# Patient Record
Sex: FEMALE | Race: WHITE | NOT HISPANIC OR LATINO | Employment: FULL TIME | URBAN - METROPOLITAN AREA
[De-identification: names, ages, dates, MRNs, and addresses within clinical notes are randomized per-mention and may not be internally consistent; named-entity substitution may affect disease eponyms.]

---

## 2017-02-10 ENCOUNTER — TRANSFERRED RECORDS (OUTPATIENT)
Dept: HEALTH INFORMATION MANAGEMENT | Facility: CLINIC | Age: 52
End: 2017-02-10

## 2017-05-09 ENCOUNTER — TRANSFERRED RECORDS (OUTPATIENT)
Dept: HEALTH INFORMATION MANAGEMENT | Facility: CLINIC | Age: 52
End: 2017-05-09

## 2017-10-20 ENCOUNTER — APPOINTMENT (OUTPATIENT)
Dept: CT IMAGING | Facility: CLINIC | Age: 52
End: 2017-10-20
Attending: EMERGENCY MEDICINE
Payer: COMMERCIAL

## 2017-10-20 ENCOUNTER — HOSPITAL ENCOUNTER (EMERGENCY)
Facility: CLINIC | Age: 52
Discharge: HOME | End: 2017-10-20
Attending: EMERGENCY MEDICINE | Admitting: EMERGENCY MEDICINE
Payer: COMMERCIAL

## 2017-10-20 VITALS
SYSTOLIC BLOOD PRESSURE: 121 MMHG | DIASTOLIC BLOOD PRESSURE: 77 MMHG | OXYGEN SATURATION: 95 % | HEART RATE: 92 BPM | TEMPERATURE: 99.1 F | RESPIRATION RATE: 15 BRPM

## 2017-10-20 DIAGNOSIS — K52.9 ENTERITIS: ICD-10-CM

## 2017-10-20 LAB
ALBUMIN SERPL-MCNC: 3.6 G/DL (ref 3.4–5)
ALP SERPL-CCNC: 82 U/L (ref 40–150)
ALT SERPL W P-5'-P-CCNC: 101 U/L (ref 0–50)
ANION GAP SERPL CALCULATED.3IONS-SCNC: 8 MMOL/L (ref 3–14)
AST SERPL W P-5'-P-CCNC: 51 U/L (ref 0–45)
BASOPHILS # BLD AUTO: 0.1 10E9/L (ref 0–0.2)
BASOPHILS NFR BLD AUTO: 1 %
BILIRUB SERPL-MCNC: 0.6 MG/DL (ref 0.2–1.3)
BUN SERPL-MCNC: 12 MG/DL (ref 7–30)
CALCIUM SERPL-MCNC: 9.1 MG/DL (ref 8.5–10.1)
CHLORIDE SERPL-SCNC: 101 MMOL/L (ref 94–109)
CO2 SERPL-SCNC: 26 MMOL/L (ref 20–32)
CREAT SERPL-MCNC: 0.73 MG/DL (ref 0.52–1.04)
DIFFERENTIAL METHOD BLD: ABNORMAL
EOSINOPHIL # BLD AUTO: 0.1 10E9/L (ref 0–0.7)
EOSINOPHIL NFR BLD AUTO: 1 %
ERYTHROCYTE [DISTWIDTH] IN BLOOD BY AUTOMATED COUNT: 14.1 % (ref 10–15)
GFR SERPL CREATININE-BSD FRML MDRD: 84 ML/MIN/1.7M2
GLUCOSE SERPL-MCNC: 101 MG/DL (ref 70–99)
HCT VFR BLD AUTO: 34.8 % (ref 35–47)
HGB BLD-MCNC: 10.8 G/DL (ref 11.7–15.7)
IMM GRANULOCYTES # BLD: 0 10E9/L (ref 0–0.4)
IMM GRANULOCYTES NFR BLD: 0.4 %
LACTATE SERPL-SCNC: 1.8 MMOL/L (ref 0.4–2)
LIPASE SERPL-CCNC: 225 U/L (ref 73–393)
LYMPHOCYTES # BLD AUTO: 1.1 10E9/L (ref 0.8–5.3)
LYMPHOCYTES NFR BLD AUTO: 16.1 %
MCH RBC QN AUTO: 28.3 PG (ref 26.5–33)
MCHC RBC AUTO-ENTMCNC: 31 G/DL (ref 31.5–36.5)
MCV RBC AUTO: 91 FL (ref 78–100)
MONOCYTES # BLD AUTO: 0.6 10E9/L (ref 0–1.3)
MONOCYTES NFR BLD AUTO: 7.8 %
NEUTROPHILS # BLD AUTO: 5.2 10E9/L (ref 1.6–8.3)
NEUTROPHILS NFR BLD AUTO: 73.7 %
NRBC # BLD AUTO: 0 10*3/UL
NRBC BLD AUTO-RTO: 0 /100
PLATELET # BLD AUTO: 323 10E9/L (ref 150–450)
POTASSIUM SERPL-SCNC: 3.8 MMOL/L (ref 3.4–5.3)
PROT SERPL-MCNC: 8.5 G/DL (ref 6.8–8.8)
RBC # BLD AUTO: 3.81 10E12/L (ref 3.8–5.2)
SODIUM SERPL-SCNC: 135 MMOL/L (ref 133–144)
WBC # BLD AUTO: 7.1 10E9/L (ref 4–11)

## 2017-10-20 PROCEDURE — 25000128 H RX IP 250 OP 636: Performed by: EMERGENCY MEDICINE

## 2017-10-20 PROCEDURE — 85025 COMPLETE CBC W/AUTO DIFF WBC: CPT | Performed by: EMERGENCY MEDICINE

## 2017-10-20 PROCEDURE — 96374 THER/PROPH/DIAG INJ IV PUSH: CPT | Mod: 59

## 2017-10-20 PROCEDURE — 96376 TX/PRO/DX INJ SAME DRUG ADON: CPT

## 2017-10-20 PROCEDURE — 83605 ASSAY OF LACTIC ACID: CPT | Performed by: EMERGENCY MEDICINE

## 2017-10-20 PROCEDURE — 99285 EMERGENCY DEPT VISIT HI MDM: CPT | Mod: 25

## 2017-10-20 PROCEDURE — 80053 COMPREHEN METABOLIC PANEL: CPT | Performed by: EMERGENCY MEDICINE

## 2017-10-20 PROCEDURE — 93005 ELECTROCARDIOGRAM TRACING: CPT

## 2017-10-20 PROCEDURE — 96361 HYDRATE IV INFUSION ADD-ON: CPT

## 2017-10-20 PROCEDURE — 96375 TX/PRO/DX INJ NEW DRUG ADDON: CPT

## 2017-10-20 PROCEDURE — 83690 ASSAY OF LIPASE: CPT | Performed by: EMERGENCY MEDICINE

## 2017-10-20 PROCEDURE — 74177 CT ABD & PELVIS W/CONTRAST: CPT

## 2017-10-20 RX ORDER — HYDROMORPHONE HYDROCHLORIDE 1 MG/ML
0.5 INJECTION, SOLUTION INTRAMUSCULAR; INTRAVENOUS; SUBCUTANEOUS
Status: DISCONTINUED | OUTPATIENT
Start: 2017-10-20 | End: 2017-10-21 | Stop reason: HOSPADM

## 2017-10-20 RX ORDER — ONDANSETRON 2 MG/ML
4 INJECTION INTRAMUSCULAR; INTRAVENOUS ONCE
Status: COMPLETED | OUTPATIENT
Start: 2017-10-20 | End: 2017-10-20

## 2017-10-20 RX ORDER — OXYCODONE AND ACETAMINOPHEN 5; 325 MG/1; MG/1
1-2 TABLET ORAL EVERY 6 HOURS PRN
Qty: 15 TABLET | Refills: 0 | Status: SHIPPED | OUTPATIENT
Start: 2017-10-20 | End: 2018-01-17

## 2017-10-20 RX ORDER — IOPAMIDOL 755 MG/ML
500 INJECTION, SOLUTION INTRAVASCULAR ONCE
Status: COMPLETED | OUTPATIENT
Start: 2017-10-20 | End: 2017-10-20

## 2017-10-20 RX ADMIN — ONDANSETRON 4 MG: 2 INJECTION INTRAMUSCULAR; INTRAVENOUS at 18:41

## 2017-10-20 RX ADMIN — IOPAMIDOL 84 ML: 755 INJECTION, SOLUTION INTRAVENOUS at 19:10

## 2017-10-20 RX ADMIN — HYDROMORPHONE HYDROCHLORIDE 0.5 MG: 1 INJECTION, SOLUTION INTRAMUSCULAR; INTRAVENOUS; SUBCUTANEOUS at 22:16

## 2017-10-20 RX ADMIN — SODIUM CHLORIDE 61 ML: 9 INJECTION, SOLUTION INTRAVENOUS at 19:10

## 2017-10-20 RX ADMIN — SODIUM CHLORIDE 1000 ML: 9 INJECTION, SOLUTION INTRAVENOUS at 18:40

## 2017-10-20 RX ADMIN — HYDROMORPHONE HYDROCHLORIDE 0.5 MG: 1 INJECTION, SOLUTION INTRAMUSCULAR; INTRAVENOUS; SUBCUTANEOUS at 18:43

## 2017-10-20 ASSESSMENT — ENCOUNTER SYMPTOMS
ABDOMINAL PAIN: 1
NAUSEA: 1
BLOOD IN STOOL: 0
VOMITING: 0

## 2017-10-20 NOTE — ED AVS SNAPSHOT
[facility] Emergency Department    [address]    [facility] [address]    Phone:  [phone]    Fax:  [phone]                                       Mercedes Arita   MRN: [id number]    Department:  [facility] Emergency Department   Date of Visit:  10/20/2017           Patient Information     Date Of Birth          1965        Your diagnoses for this visit were:     Enteritis        You were seen by Celio Waldron MD.      Follow-up Information     Call Gastroenterologist .    Why:  call Monday AM to discuss follow-up         Follow up with [facility] Emergency Department.    Specialty:  EMERGENCY MEDICINE    Why:  As needed, If symptoms worsen - uncontrollable/worsening pain, fever > 100.4, vomiting, blood in stool, or for any other concerns.     Contact information:    [address]  [facility] [address]  [phone]        Discharge Instructions       Discharge Instructions  Abdominal Pain    Abdominal pain (belly pain) can be caused by many things. Your evaluation today does not show the exact cause for your pain. Your provider today has decided that it is unlikely your pain is due to a life threatening problem, or a problem requiring surgery or hospital admission. Sometimes those problems cannot be found right away, so it is very important that you follow up as directed.  Sometimes only the changes which occur over time allow the cause of your pain to be found.    Generally, every Emergency Department visit should have a follow-up clinic visit with either a primary or a specialty clinic/provider. Please follow-up as instructed by your emergency provider today. With abdominal pain, we often recommend very close follow-up, such as the following day.    ADULTS:  Return to the Emergency Department right away if:      You get an oral temperature above 102oF or as directed by your provider.    You have blood in your stools. This  may be bright red or appear as black, tarry stools.      You keep vomiting (throwing up) or cannot drink liquids.    You see blood when you vomit.     You cannot have a bowel movement or you cannot pass gas.    Your stomach gets bloated or bigger.    Your skin or the whites of your eyes look yellow.    You faint.    You have bloody, frequent or painful urination (peeing).    You have new symptoms or anything that worries you.    CHILDREN:  Return to the Emergency Department right away if your child has any of the above-listed symptoms or the following:      Pushes your hand away or screams/cries when his/her belly is touched.    You notice your child is very fussy or weak.    Your child is very tired and is too tired to eat or drink.    Your child is dehydrated.  Signs of dehydration can be:  o Significant change in the amount of wet diapers/urine.  o Your infant or child starts to have dry mouth and lips, or no saliva (spit) or tears.    PREGNANT WOMEN:  Return to the Emergency Department right away if you have any of the above-listed symptoms or the following:      You have bleeding, leaking fluid or passing tissue from the vagina.    You have worse pain or cramping, or pain in your shoulder or back.    You have vomiting that will not stop.    You have a temperature of 100oF or more.    Your baby is not moving as much as usual.    You faint.    You get a bad headache with or without eye problems and abdominal pain.    You have a seizure.    You have unusual discharge from your vagina and abdominal pain.    Abdominal pain is pretty common during pregnancy.  Your pain may or may not be related to your pregnancy. You should follow-up closely with your OB provider so they can evaluate you and your baby.  Until you follow-up with your regular provider, do the following:       Avoid sex and do not put anything in your vagina.    Drink clear fluids.    Only take medications approved by your provider.    MORE  "INFORMATION:    Appendicitis:  A possible cause of abdominal pain in any person who still has their appendix is acute appendicitis. Appendicitis is often hard to diagnose.  Testing does not always rule out early appendicitis or other causes of abdominal pain. Close follow-up with your provider and re-evaluations may be needed to figure out the reason for your abdominal pain.    Follow-up:  It is very important that you make an appointment with your clinic and go to the appointment.  If you do not follow-up with your primary provider, it may result in missing an important development which could result in permanent injury or disability and/or lasting pain.  If there is any problem keeping your appointment, call your provider or return to the Emergency Department.    Medications:  Take your medications as directed by your provider today.  Before using over-the-counter medications, ask your provider and make sure to take the medications as directed.  If you have any questions about medications, ask your provider.    Diet:  Resume your normal diet as much as possible, but do not eat fried, fatty or spicy foods while you have pain.  Do not drink alcohol or have caffeine.  Do not smoke tobacco.    Probiotics: If you have been given an antibiotic, you may want to also take a probiotic pill or eat yogurt with live cultures. Probiotics have \"good bacteria\" to help your intestines stay healthy. Studies have shown that probiotics help prevent diarrhea (loose stools) and other intestine problems (including C. diff infection) when you take antibiotics. You can buy these without a prescription in the pharmacy section of the store.     If you were given a prescription for medicine here today, be sure to read all of the information (including the package insert) that comes with your prescription.  This will include important information about the medicine, its side effects, and any warnings that you need to know about.  The " pharmacist who fills the prescription can provide more information and answer questions you may have about the medicine.  If you have questions or concerns that the pharmacist cannot address, please call or return to the Emergency Department.       Remember that you can always come back to the Emergency Department if you are not able to see your regular provider in the amount of time listed above, if you get any new symptoms, or if there is anything that worries you.      24 Hour Appointment Hotline       To make an appointment at any [facility], call [phone] ([phone]). If you don't have a family doctor or clinic, we will help you find one. [facility] are conveniently located to serve the needs of you and your family.             Review of your medicines      START taking        Dose / Directions Last dose taken    oxyCODONE-acetaminophen 5-325 MG per tablet   Commonly known as:  PERCOCET   Dose:  1-2 tablet   Quantity:  15 tablet        Take 1-2 tablets by mouth every 6 hours as needed for pain   Refills:  0          Our records show that you are taking the medicines listed below. If these are incorrect, please call your family doctor or clinic.        Dose / Directions Last dose taken    cyanocobalamin 1000 MCG/ML injection   Commonly known as:  VITAMIN B12   Dose:  1 mL        Inject 1 mL as directed every 30 days.   Refills:  0        mercaptopurine 50 MG tablet CHEMO   Commonly known as:  PURINETHOL        Refills:  0                Prescriptions were sent or printed at these locations (1 Prescription)                   Other Prescriptions                Printed at Department/Unit printer (1 of 1)         oxyCODONE-acetaminophen (PERCOCET) 5-325 MG per tablet                Procedures and tests performed during your visit     CBC with platelets differential    CT Abdomen Pelvis w Contrast    Comprehensive metabolic panel    EKG 12 lead    Lactic acid    Lipase    Peripheral IV catheter       Orders Needing Specimen Collection     None      Pending Results     Date and Time Order Name Status Description    10/20/2017 1814 EKG 12 lead Preliminary             Pending Culture Results     No orders found from 10/18/2017 to 10/21/2017.            Pending Results Instructions     If you had any lab results that were not finalized at the time of your Discharge, you can call the ED Lab Result RN at [phone]. You will be contacted by this team for any positive Lab results or changes in treatment. The nurses are available 7 days a week from 10A to 6:30P.  You can leave a message 24 hours per day and they will return your call.        Test Results From Your Hospital Stay        10/20/2017  6:45 PM      Component Results     Component Value Ref Range & Units Status    WBC 7.1 4.0 - 11.0 10e9/L Final    RBC Count 3.81 3.8 - 5.2 10e12/L Final    Hemoglobin 10.8 (L) 11.7 - 15.7 g/dL Final    Hematocrit 34.8 (L) 35.0 - 47.0 % Final    MCV 91 78 - 100 fl Final    MCH 28.3 26.5 - 33.0 pg Final    MCHC 31.0 (L) 31.5 - 36.5 g/dL Final    RDW 14.1 10.0 - 15.0 % Final    Platelet Count 323 150 - 450 10e9/L Final    Diff Method Automated Method  Final    % Neutrophils 73.7 % Final    % Lymphocytes 16.1 % Final    % Monocytes 7.8 % Final    % Eosinophils 1.0 % Final    % Basophils 1.0 % Final    % Immature Granulocytes 0.4 % Final    Nucleated RBCs 0 0 /100 Final    Absolute Neutrophil 5.2 1.6 - 8.3 10e9/L Final    Absolute Lymphocytes 1.1 0.8 - 5.3 10e9/L Final    Absolute Monocytes 0.6 0.0 - 1.3 10e9/L Final    Absolute Eosinophils 0.1 0.0 - 0.7 10e9/L Final    Absolute Basophils 0.1 0.0 - 0.2 10e9/L Final    Abs Immature Granulocytes 0.0 0 - 0.4 10e9/L Final    Absolute Nucleated RBC 0.0  Final         10/20/2017  6:50 PM      Component Results     Component Value Ref Range & Units Status    Lactic Acid 1.8 0.4 - 2.0 mmol/L Final         10/20/2017  7:05 PM      Component Results     Component Value Ref Range &  Units Status    Sodium 135 133 - 144 mmol/L Final    Potassium 3.8 3.4 - 5.3 mmol/L Final    Chloride 101 94 - 109 mmol/L Final    Carbon Dioxide 26 20 - 32 mmol/L Final    Anion Gap 8 3 - 14 mmol/L Final    Glucose 101 (H) 70 - 99 mg/dL Final    Urea Nitrogen 12 7 - 30 mg/dL Final    Creatinine 0.73 0.52 - 1.04 mg/dL Final    GFR Estimate 84 >60 mL/min/1.7m2 Final    Non  GFR Calc    GFR Estimate If Black >90 >60 mL/min/1.7m2 Final    African American GFR Calc    Calcium 9.1 8.5 - 10.1 mg/dL Final    Bilirubin Total 0.6 0.2 - 1.3 mg/dL Final    Albumin 3.6 3.4 - 5.0 g/dL Final    Protein Total 8.5 6.8 - 8.8 g/dL Final    Alkaline Phosphatase 82 40 - 150 U/L Final     (H) 0 - 50 U/L Final    AST 51 (H) 0 - 45 U/L Final         10/20/2017  7:05 PM      Component Results     Component Value Ref Range & Units Status    Lipase 225 73 - 393 U/L Final         10/20/2017  9:44 PM      Narrative     CT ABDOMEN AND PELVIS WITH CONTRAST 10/20/2017 7:20 PM     HISTORY: Right-sided abdominal pain - history of Crohn's, hernia,  multiple surgeries.    TECHNIQUE: 84 mL Isovue-370 IV. Radiation dose for this scan was  reduced using automated exposure control, adjustment of the mA and/or  kV according to patient size, or iterative reconstruction technique.    COMPARISON: Abdomen/pelvis CT from 8/21/2012.    FINDINGS: Included portions of the lung bases are unremarkable.    Mild fatty infiltration of the liver. Previous cholecystectomy. Spleen  is normal. Pancreas is normal. No adrenal lesions. Kidneys are normal.  No retroperitoneal adenopathy or evidence of aortic aneurysm.    Scans through the pelvis demonstrate a normal-appearing bladder. No  free fluid. No adnexal masses.    There has been previous surgery near the ileocecal junction. There is  mild dilatation of the colon in this region. No significant small  bowel dilatation in this region. The colon does decrease in size to  normal without definite  focal obstruction seen.    There is inflammation surrounding the proximal portion of the  duodenum. This is near the pylorus/duodenal bulb region. Stomach does  not appear distended. There is some very mild small bowel dilatation  involving the C-loop of the duodenum, but no other small bowel  dilatation seen. No free fluid or free air.        Impression     IMPRESSION:  1. Postoperative changes from ileocecal surgery. The ileum appears  widely patent at small bowel colon junction. The colon in this region  is mildly dilated. No evidence of obstruction, however. The colon  tapers to normal size again without definite evidence of obstruction.  2. New area of inflammation in the proximal duodenum in the region of  the duodenal bulb and adjacent to the pylorus. The stomach does not  appear distended. There is fluid in the C-loop of the duodenum with  possible very mild small bowel dilatation involving the C-loop. There  is, however, no evidence of obstruction.    KRYS ROSSI MD                Clinical Quality Measure: Blood Pressure Screening     Your blood pressure was checked while you were in the emergency department today. The last reading we obtained was  BP: 157/90 . Please read the guidelines below about what these numbers mean and what you should do about them.  If your systolic blood pressure (the top number) is less than 120 and your diastolic blood pressure (the bottom number) is less than 80, then your blood pressure is normal. There is nothing more that you need to do about it.  If your systolic blood pressure (the top number) is 120-139 or your diastolic blood pressure (the bottom number) is 80-89, your blood pressure may be higher than it should be. You should have your blood pressure rechecked within a year by a primary care provider.  If your systolic blood pressure (the top number) is 140 or greater or your diastolic blood pressure (the bottom number) is 90 or greater, you may have high blood  "pressure. High blood pressure is treatable, but if left untreated over time it can put you at risk for heart attack, stroke, or kidney failure. You should have your blood pressure rechecked by a primary care provider within the next 4 weeks.  If your provider in the emergency department today gave you specific instructions to follow-up with your doctor or provider even sooner than that, you should follow that instruction and not wait for up to 4 weeks for your follow-up visit.        Thank you for choosing [facility]       Thank you for choosing [facility] for your care. Our goal is always to provide you with excellent care. Hearing back from our patients is one way we can continue to improve our services. Please take a few minutes to complete the written survey that you may receive in the mail after you visit with us. Thank you!        [facility] Information     [employer] lets you send messages to your doctor, view your test results, renew your prescriptions, schedule appointments and more. To sign up, go to [email or web address] . Click on \"Log in\" on the left side of the screen, which will take you to the Welcome page. Then click on \"Sign up Now\" on the right side of the page.     You will be asked to enter the access code listed below, as well as some personal information. Please follow the directions to create your username and password.     Your access code is: [email or web address]  Expires: 2018  9:57 PM     Your access code will  in 90 days. If you need help or a new code, please call your [facility] clinic or [phone].        Care EveryWhere ID     This is your Care EveryWhere ID. This could be used by other organizations to access your [facility] medical records  [id number]        Equal Access to Services     KATERINA ROMAN : [address] [place], [address]. So [facility] [phone].    ATENCIÓN: Si habla español, tiene a emmanuel " disposición servicios gratuitos de asistencia lingüística. Abbie al [phone].    We comply with applicable federal civil rights laws and [place] laws. We do not discriminate on the basis of race, color, national origin, age, disability, sex, sexual orientation, or gender identity.            After Visit Summary       This is your record. Keep this with you and show to your community pharmacist(s) and doctor(s) at your next visit.

## 2017-10-20 NOTE — ED NOTES
Patient presents for complaint of mid epigastric abdominal pain since Tuesday. Patient states that she was out for a walk and noticed onset of the pain. Patient states it has been intermittent since until today when it became sharp and constant. Hx of bowel resections and Crohn's disease. ABC intact, A&Ox4.

## 2017-10-20 NOTE — ED AVS SNAPSHOT
[facility] Emergency Department    [address]    [facility] [address]    Phone:  [phone]    Fax:  [phone]                                       Mercedes Arita   MRN: [id number]    Department:  [facility] Emergency Department   Date of Visit:  10/20/2017           After Visit Summary Signature Page     I have received my discharge instructions, and my questions have been answered. I have discussed any challenges I see with this plan with the nurse or doctor.    ..........................................................................................................................................  Patient/Patient Representative Signature      ..........................................................................................................................................  Patient Representative Print Name and Relationship to Patient    ..................................................               ................................................  Date                                            Time    ..........................................................................................................................................  Reviewed by Signature/Title    ...................................................              ..............................................  Date                                                            Time

## 2017-10-21 NOTE — ED PROVIDER NOTES
History     Chief Complaint:  Abdominal Pain    HPI   Mercedes Arita is a 52 year old female with Crohn's disease on Mercaptopurine and with an extensive surgical history including 7 ventral hernia repairs, appendectomy, cholecystectomy, and ileocolonic resections who presents to the emergency department today for evaluation of abdominal pain. The patient reports that on Tuesday, 10/17/2017, she was walking on a treadmill when she developed some abdominal pain which then subsided. On Wednesday, the patient's abdominal returned and was worse and continued throughout Thursday. This morning, the patient reports that her abdominal pain was worse but then this afternoon her pain returned and was constant, prompting her current presentation to the emergency department. The patient characterizes her pain as sharp and reports that she is nauseous. The patient had a normal, non-bloody bowel movement this morning and has not vomited. The patient has not taken any medication to manage her pain. Of note, the patient's gastroenterologist is Dr. Chevy Fonseca.     Allergies:  Contrast Dye    Medications:    Mercaptopurine  Cyanocobalamin     Past Medical History:    Chronic pain  Crohn's disease   PONV    Past Surgical History:    Cholecystectomy  Dilate rectum, lysis of adhesion, laparoscopic assisted ileocolic resection  GI surgery, bowel resection times 3 ileo-colonic resections  Hernia repair, ventral x 7 with mesh  Laparoscopic resection ileocecal  Laparotomy exploratory for removal of adhesions  Mohs micrographic procedure for removal of skin cancer on face  Orthopedic surgery, carpal tunnel     Family History:    History reviewed. No pertinent family history.     Social History:  The patient was accompanied to the ED by her friend.   Smoking Status: Never Smoker  Smokeless Tobacco: Never Used  Alcohol Use: Positive -- Rarely -- Two times per year   Marital Status:  Single     Review of Systems   Gastrointestinal: Positive  for abdominal pain and nausea. Negative for blood in stool and vomiting.   All other systems reviewed and are negative.    Physical Exam     Patient Vitals for the past 24 hrs:   BP Temp Temp src Pulse Heart Rate Resp SpO2   10/20/17 2214 - - - - - - 95 %   10/20/17 2213 121/77 - - - - - -   10/20/17 1830 - - - - 77 15 -   10/20/17 1815 - - - - 84 13 -   10/20/17 1812 - - - - 86 - 100 %   10/20/17 1802 157/90 99.1  F (37.3  C) Oral 92 - 16 99 %     Physical Exam  Nursing note and vitals reviewed.  Constitutional: Cooperative.   HENT:   Mouth/Throat: Moist mucous membranes.   Eyes: EOMI, nonicteric sclera  Cardiovascular: Normal rate, regular rhythm, no murmurs, rubs, or gallops  Pulmonary/Chest: Effort normal and breath sounds normal. No respiratory distress. No wheezes. No rales.   Abdominal: Soft. RLQ TTP, nondistended, no guarding or rigidity. BS present.   Musculoskeletal: Normal range of motion.   Neurological: Alert. Moves all extremities spontaneously.   Skin: Skin is warm and dry. No rash noted.   Psychiatric: Normal mood and affect.       Emergency Department Course     ECG:  ECG taken at 1805, ECG read at 1810  Sinus rhythm  Nonspecific ST abnormality  Abnormal QRS-T angle, consider primary T wave abnormality  Abnormal ECG  Rate 81 bpm. AZ interval 112 ms. QRS duration 72 ms. QT/QTc 374/434 ms. P-R-T axes 40 54 -12.    Imaging:  Radiology findings were communicated with the patient who voiced understanding of the findings.    CT Abdomen Pelvis w Contrast  1. Postoperative changes from ileocecal surgery. The ileum appears  widely patent at small bowel colon junction. The colon in this region  is mildly dilated. No evidence of obstruction, however. The colon  tapers to normal size again without definite evidence of obstruction.  2. New area of inflammation in the proximal duodenum in the region of  the duodenal bulb and adjacent to the pylorus. The stomach does not  appear distended. There is fluid in the  C-loop of the duodenum with  possible very mild small bowel dilatation involving the C-loop. There  is, however, no evidence of obstruction.  Reading per radiology    Laboratory:  Laboratory findings were communicated with the patient who voiced understanding of the findings.    CBC: WBC 7.1, HGB 10.8 (L),   CMP: Glucose 101 (H),  (H), AST 51 (H) o/w WNL (Creatinine 0.73)  Lipase: 225  Lactic Acid (Collected 1819): 1.8      Interventions:  1840 NS 1000 ml IV   1841 Zofran 4 mg IV   1843 Dilaudid 0.5 mg IV     Emergency Department Course:    Nursing notes and vitals reviewed.    I performed an exam of the patient as documented above.     The patient was sent for a CT Abdomen Pelvis w Contrast while in the emergency department, results above.      IV was inserted and blood was drawn for laboratory testing, results above.    I personally reviewed the laboratory, imaging, and EKG results with the patient and answered all related questions prior to discharge.    Impression & Plan      Medical Decision Making:  Mercedes Arita is a 52 year old female who presents to the emergency department today with abdominal pain. The patient has a history of Crohn's and is concerned about complications. The differential is broad and includes colitis, abscess, bowel obstruction, ovarian pathology, amongst others. The patient's gallbladder and appendix have previously been surgically removed. CT shows some nonspecific inflammation in her small bowel. This most likely represents an enteritis and is unlikely to represent a Crohn's flare. The patient was given some pain medication for pain management at home. No indication for hospital admission at this time. She was advised to follow up with her gastroenterologist early next week. She is in stable condition at the time of discharge, indications for return to the ED were discussed as well as follow up. All questions were answered and she is in agreement with the  plan.    Diagnosis:    ICD-10-CM    1. Enteritis K52.9      Disposition:   The patient is discharged to home.    Discharge Medications:  Discharge Medication List as of 10/20/2017 10:12 PM      START taking these medications    Details   oxyCODONE-acetaminophen (PERCOCET) 5-325 MG per tablet Take 1-2 tablets by mouth every 6 hours as needed for pain, Disp-15 tablet, R-0, Local Print           Scribe Disclosure:  I, Nick Abbasi, am serving as a scribe at 8:05 PM on 10/20/2017 to document services personally performed by Celio Waldron MD, based on my observations and the provider's statements to me.    [facility] EMERGENCY DEPARTMENT       Celio Waldron MD  10/23/17 050

## 2017-10-23 LAB — INTERPRETATION ECG - MUSE: NORMAL

## 2017-11-08 ENCOUNTER — TRANSFERRED RECORDS (OUTPATIENT)
Dept: HEALTH INFORMATION MANAGEMENT | Facility: CLINIC | Age: 52
End: 2017-11-08

## 2017-12-20 ENCOUNTER — TRANSFERRED RECORDS (OUTPATIENT)
Dept: HEALTH INFORMATION MANAGEMENT | Facility: CLINIC | Age: 52
End: 2017-12-20

## 2018-01-17 ENCOUNTER — HOSPITAL ENCOUNTER (OUTPATIENT)
Facility: CLINIC | Age: 53
Setting detail: OBSERVATION
Discharge: HOME | End: 2018-01-19
Attending: NURSE PRACTITIONER | Admitting: ANESTHESIOLOGY
Payer: COMMERCIAL

## 2018-01-17 ENCOUNTER — APPOINTMENT (OUTPATIENT)
Dept: CT IMAGING | Facility: CLINIC | Age: 53
End: 2018-01-17
Attending: NURSE PRACTITIONER
Payer: COMMERCIAL

## 2018-01-17 DIAGNOSIS — K50.019 CROHN'S DISEASE OF ILEUM WITH COMPLICATION (H): Primary | ICD-10-CM

## 2018-01-17 DIAGNOSIS — R10.9 ABDOMINAL PAIN: ICD-10-CM

## 2018-01-17 PROBLEM — K50.90 CROHN DISEASE (H): Status: ACTIVE | Noted: 2018-01-17

## 2018-01-17 LAB
ALBUMIN SERPL-MCNC: 3.7 G/DL (ref 3.4–5)
ALP SERPL-CCNC: 86 U/L (ref 40–150)
ALT SERPL W P-5'-P-CCNC: 82 U/L (ref 0–50)
ANION GAP SERPL CALCULATED.3IONS-SCNC: 9 MMOL/L (ref 3–14)
AST SERPL W P-5'-P-CCNC: 50 U/L (ref 0–45)
BASOPHILS # BLD AUTO: 0 10E9/L (ref 0–0.2)
BASOPHILS NFR BLD AUTO: 0.6 %
BILIRUB SERPL-MCNC: 0.5 MG/DL (ref 0.2–1.3)
BUN SERPL-MCNC: 11 MG/DL (ref 7–30)
CALCIUM SERPL-MCNC: 9 MG/DL (ref 8.5–10.1)
CHLORIDE SERPL-SCNC: 104 MMOL/L (ref 94–109)
CO2 SERPL-SCNC: 25 MMOL/L (ref 20–32)
CREAT SERPL-MCNC: 0.72 MG/DL (ref 0.52–1.04)
DIFFERENTIAL METHOD BLD: ABNORMAL
EOSINOPHIL # BLD AUTO: 0.1 10E9/L (ref 0–0.7)
EOSINOPHIL NFR BLD AUTO: 1.2 %
ERYTHROCYTE [DISTWIDTH] IN BLOOD BY AUTOMATED COUNT: 18.4 % (ref 10–15)
GFR SERPL CREATININE-BSD FRML MDRD: 85 ML/MIN/1.7M2
GLUCOSE SERPL-MCNC: 77 MG/DL (ref 70–99)
HCT VFR BLD AUTO: 40 % (ref 35–47)
HGB BLD-MCNC: 13.5 G/DL (ref 11.7–15.7)
IMM GRANULOCYTES # BLD: 0 10E9/L (ref 0–0.4)
IMM GRANULOCYTES NFR BLD: 0.3 %
LIPASE SERPL-CCNC: 140 U/L (ref 73–393)
LYMPHOCYTES # BLD AUTO: 2 10E9/L (ref 0.8–5.3)
LYMPHOCYTES NFR BLD AUTO: 29.1 %
MCH RBC QN AUTO: 28.5 PG (ref 26.5–33)
MCHC RBC AUTO-ENTMCNC: 33.8 G/DL (ref 31.5–36.5)
MCV RBC AUTO: 85 FL (ref 78–100)
MONOCYTES # BLD AUTO: 0.4 10E9/L (ref 0–1.3)
MONOCYTES NFR BLD AUTO: 6.2 %
NEUTROPHILS # BLD AUTO: 4.3 10E9/L (ref 1.6–8.3)
NEUTROPHILS NFR BLD AUTO: 62.6 %
NRBC # BLD AUTO: 0 10*3/UL
NRBC BLD AUTO-RTO: 0 /100
PLATELET # BLD AUTO: 221 10E9/L (ref 150–450)
POTASSIUM SERPL-SCNC: 3.6 MMOL/L (ref 3.4–5.3)
PROT SERPL-MCNC: 8.6 G/DL (ref 6.8–8.8)
RBC # BLD AUTO: 4.73 10E12/L (ref 3.8–5.2)
SODIUM SERPL-SCNC: 138 MMOL/L (ref 133–144)
WBC # BLD AUTO: 6.8 10E9/L (ref 4–11)

## 2018-01-17 PROCEDURE — 96374 THER/PROPH/DIAG INJ IV PUSH: CPT

## 2018-01-17 PROCEDURE — 99220 ZZC INITIAL OBSERVATION CARE,LEVL III: CPT | Performed by: INTERNAL MEDICINE

## 2018-01-17 PROCEDURE — 96375 TX/PRO/DX INJ NEW DRUG ADDON: CPT

## 2018-01-17 PROCEDURE — 96361 HYDRATE IV INFUSION ADD-ON: CPT

## 2018-01-17 PROCEDURE — 74176 CT ABD & PELVIS W/O CONTRAST: CPT

## 2018-01-17 PROCEDURE — 96376 TX/PRO/DX INJ SAME DRUG ADON: CPT

## 2018-01-17 PROCEDURE — G0378 HOSPITAL OBSERVATION PER HR: HCPCS

## 2018-01-17 PROCEDURE — 85025 COMPLETE CBC W/AUTO DIFF WBC: CPT | Performed by: NURSE PRACTITIONER

## 2018-01-17 PROCEDURE — 25000128 H RX IP 250 OP 636: Performed by: NURSE PRACTITIONER

## 2018-01-17 PROCEDURE — 83690 ASSAY OF LIPASE: CPT | Performed by: NURSE PRACTITIONER

## 2018-01-17 PROCEDURE — 99207 ZZC APP CREDIT; MD BILLING SHARED VISIT: CPT | Performed by: NURSE PRACTITIONER

## 2018-01-17 PROCEDURE — 80053 COMPREHEN METABOLIC PANEL: CPT | Performed by: NURSE PRACTITIONER

## 2018-01-17 PROCEDURE — 99285 EMERGENCY DEPT VISIT HI MDM: CPT | Mod: 25

## 2018-01-17 RX ORDER — ONDANSETRON 4 MG/1
4 TABLET, ORALLY DISINTEGRATING ORAL EVERY 6 HOURS PRN
Status: DISCONTINUED | OUTPATIENT
Start: 2018-01-17 | End: 2018-01-19 | Stop reason: HOSPADM

## 2018-01-17 RX ORDER — SODIUM CHLORIDE 9 MG/ML
1000 INJECTION, SOLUTION INTRAVENOUS CONTINUOUS
Status: DISCONTINUED | OUTPATIENT
Start: 2018-01-17 | End: 2018-01-17

## 2018-01-17 RX ORDER — NALOXONE HYDROCHLORIDE 0.4 MG/ML
.1-.4 INJECTION, SOLUTION INTRAMUSCULAR; INTRAVENOUS; SUBCUTANEOUS
Status: DISCONTINUED | OUTPATIENT
Start: 2018-01-17 | End: 2018-01-19 | Stop reason: HOSPADM

## 2018-01-17 RX ORDER — PROCHLORPERAZINE 25 MG
25 SUPPOSITORY, RECTAL RECTAL EVERY 12 HOURS PRN
Status: DISCONTINUED | OUTPATIENT
Start: 2018-01-17 | End: 2018-01-19 | Stop reason: HOSPADM

## 2018-01-17 RX ORDER — SODIUM CHLORIDE 9 MG/ML
INJECTION, SOLUTION INTRAVENOUS ONCE
Status: DISCONTINUED | OUTPATIENT
Start: 2018-01-17 | End: 2018-01-17

## 2018-01-17 RX ORDER — HYDROMORPHONE HYDROCHLORIDE 1 MG/ML
0.5 INJECTION, SOLUTION INTRAMUSCULAR; INTRAVENOUS; SUBCUTANEOUS
Status: DISCONTINUED | OUTPATIENT
Start: 2018-01-17 | End: 2018-01-17

## 2018-01-17 RX ORDER — ONDANSETRON 2 MG/ML
4 INJECTION INTRAMUSCULAR; INTRAVENOUS
Status: COMPLETED | OUTPATIENT
Start: 2018-01-17 | End: 2018-01-17

## 2018-01-17 RX ORDER — PROCHLORPERAZINE MALEATE 10 MG
10 TABLET ORAL EVERY 6 HOURS PRN
Status: DISCONTINUED | OUTPATIENT
Start: 2018-01-17 | End: 2018-01-19 | Stop reason: HOSPADM

## 2018-01-17 RX ORDER — SODIUM CHLORIDE 9 MG/ML
INJECTION, SOLUTION INTRAVENOUS CONTINUOUS
Status: DISCONTINUED | OUTPATIENT
Start: 2018-01-18 | End: 2018-01-19

## 2018-01-17 RX ORDER — ONDANSETRON 2 MG/ML
4 INJECTION INTRAMUSCULAR; INTRAVENOUS EVERY 6 HOURS PRN
Status: DISCONTINUED | OUTPATIENT
Start: 2018-01-17 | End: 2018-01-19 | Stop reason: HOSPADM

## 2018-01-17 RX ORDER — MERCAPTOPURINE 50 MG/1
100 TABLET ORAL AT BEDTIME
Status: DISCONTINUED | OUTPATIENT
Start: 2018-01-17 | End: 2018-01-19 | Stop reason: HOSPADM

## 2018-01-17 RX ORDER — ACETAMINOPHEN 325 MG/1
650 TABLET ORAL EVERY 4 HOURS PRN
Status: DISCONTINUED | OUTPATIENT
Start: 2018-01-17 | End: 2018-01-19 | Stop reason: HOSPADM

## 2018-01-17 RX ORDER — OXYCODONE AND ACETAMINOPHEN 5; 325 MG/1; MG/1
1-2 TABLET ORAL EVERY 4 HOURS PRN
Status: DISCONTINUED | OUTPATIENT
Start: 2018-01-17 | End: 2018-01-19 | Stop reason: HOSPADM

## 2018-01-17 RX ORDER — ACETAMINOPHEN 650 MG/1
650 SUPPOSITORY RECTAL EVERY 4 HOURS PRN
Status: DISCONTINUED | OUTPATIENT
Start: 2018-01-17 | End: 2018-01-19 | Stop reason: HOSPADM

## 2018-01-17 RX ORDER — HYDROMORPHONE HYDROCHLORIDE 1 MG/ML
0.3 INJECTION, SOLUTION INTRAMUSCULAR; INTRAVENOUS; SUBCUTANEOUS
Status: DISCONTINUED | OUTPATIENT
Start: 2018-01-17 | End: 2018-01-19 | Stop reason: HOSPADM

## 2018-01-17 RX ORDER — POTASSIUM CHLORIDE 7.45 MG/ML
10 INJECTION INTRAVENOUS ONCE
Status: DISCONTINUED | OUTPATIENT
Start: 2018-01-17 | End: 2018-01-17 | Stop reason: CLARIF

## 2018-01-17 RX ADMIN — SODIUM CHLORIDE 1000 ML: 9 INJECTION, SOLUTION INTRAVENOUS at 18:20

## 2018-01-17 RX ADMIN — ONDANSETRON 4 MG: 2 INJECTION INTRAMUSCULAR; INTRAVENOUS at 18:20

## 2018-01-17 RX ADMIN — SODIUM CHLORIDE 1000 ML: 9 INJECTION, SOLUTION INTRAVENOUS at 20:08

## 2018-01-17 RX ADMIN — Medication 0.5 MG: at 20:08

## 2018-01-17 RX ADMIN — Medication 0.3 MG: at 22:31

## 2018-01-17 RX ADMIN — Medication 0.5 MG: at 18:20

## 2018-01-17 ASSESSMENT — ENCOUNTER SYMPTOMS
ABDOMINAL PAIN: 1
FREQUENCY: 0
DYSURIA: 0
HEMATURIA: 0
NAUSEA: 1

## 2018-01-17 NOTE — IP AVS SNAPSHOT
[address], [address]    [facility] [address]    Phone:  [phone]                                       After Visit Summary   1/17/2018    Mercedes Arita    MRN: [id number]           After Visit Summary Signature Page     I have received my discharge instructions, and my questions have been answered. I have discussed any challenges I see with this plan with the nurse or doctor.    ..........................................................................................................................................  Patient/Patient Representative Signature      ..........................................................................................................................................  Patient Representative Print Name and Relationship to Patient    ..................................................               ................................................  Date                                            Time    ..........................................................................................................................................  Reviewed by Signature/Title    ...................................................              ..............................................  Date                                                            Time

## 2018-01-17 NOTE — IP AVS SNAPSHOT
MRN:[id number]                      After Visit Summary   1/17/2018    Mercedes Arita    MRN: [id number]           Thank you!     Thank you for choosing [facility] for your care. Our goal is always to provide you with excellent care. Hearing back from our patients is one way we can continue to improve our services. Please take a few minutes to complete the written survey that you may receive in the mail after you visit with us. Thank you!        Patient Information     Date Of Birth          1965        About your hospital stay     You were admitted on:  January 17, 2018 You last received care in the:  [place] [address] [facility]    You were discharged on:  January 19, 2018        Reason for your hospital stay       Duodenal stricture that was dilated                  Who to Call     For medical emergencies, please call 911.  For non-urgent questions about your medical care, please call your primary care provider or clinic, [phone]  For questions related to your surgery, please call your surgery clinic        Attending Provider     Provider Specialty    Kae May, CNP --    Ulysses Prescott MD Internal Medicine       Primary Care Provider Office Phone # Fax #    Chevy Fonseca [phone]      After Care Instructions     Activity       Your activity upon discharge: activity as tolerated            Diet       Follow this diet upon discharge: Orders Placed This Encounter      Regular Diet Adult                  Follow-up Appointments     Follow-up and recommended labs and tests        Follow up with primary care provider, Chevy Fonseca, within 7 days to evaluate treatment change.  No follow up labs or test are needed.      Follow up with GI as outpatient as directed                  Pending Results     No orders found from 1/15/2018 to 1/18/2018.            Statement of Approval     Ordered          01/19/18 1022  I have reviewed and agree with all the  "recommendations and orders detailed in this document.  EFFECTIVE NOW     Approved and electronically signed by:  Ulysses Mccord DO             Admission Information     Date & Time Provider Department Dept. Phone    2018 Ulysses Prescott MD [place] [address] [facility] [phone]      Your Vitals Were     Blood Pressure Temperature Respirations Height Weight Pulse Oximetry    128/62 (BP Location: Left arm) 98.3  F (36.8  C) (Oral) 14 1.585 m (5' 2.4\") 73.1 kg (161 lb 3.2 oz) 91%    BMI (Body Mass Index)                   29.11 kg/m2           MyChart Information     [employer]obi lets you send messages to your doctor, view your test results, renew your prescriptions, schedule appointments and more. To sign up, go to [email or web address] . Click on \"Log in\" on the left side of the screen, which will take you to the Welcome page. Then click on \"Sign up Now\" on the right side of the page.     You will be asked to enter the access code listed below, as well as some personal information. Please follow the directions to create your username and password.     Your access code is: [email or web address]  Expires: 2018 10:58 AM     Your access code will  in 90 days. If you need help or a new code, please call your [facility] clinic or [phone].        Care EveryWhere ID     This is your Care EveryWhere ID. This could be used by other organizations to access your [facility] medical records  [id number]        Equal Access to Services     [facility] Hospital[facility]ANG[facility] : Hadii naomi lawler hadasho Somauali, waaxda luqadaha, qaybta kaalmada fausto, brannon idiin hayaan adeeg kharash la'aan . So [facility] [phone].    ATENCIÓN: Si habla español, tiene a emmanuel disposición servicios gratuitos de asistencia lingüística. Llame al [phone].    We comply with applicable federal civil rights laws and [place] laws. We do not discriminate on the basis of race, color, national origin, age, disability, sex, sexual orientation, or " gender identity.               Review of your medicines      START taking        Dose / Directions    pantoprazole 40 MG EC tablet   Commonly known as:  PROTONIX   Used for:  Crohn's disease of ileum with complication (H)        Dose:  40 mg   Start taking on:  1/20/2018   Take 1 tablet (40 mg) by mouth 2 times daily (before meals)   Quantity:  60 tablet   Refills:  1         CONTINUE these medicines which have NOT CHANGED        Dose / Directions    cyanocobalamin 1000 MCG/ML injection   Commonly known as:  VITAMIN B12        Dose:  1 mL   Inject 1 mL as directed every 30 days.   Refills:  0       mercaptopurine 50 MG tablet CHEMO   Commonly known as:  PURINETHOL        Dose:  100 mg   Take 100 mg by mouth At Bedtime   Refills:  0            Where to get your medicines      These medications were sent to [facility] [id number] [place] [address] [facility] [address] [address] [facility] [address]     Phone:  [phone]     pantoprazole 40 MG EC tablet                Protect others around you: Learn how to safely use, store and throw away your medicines at www.disposemymeds.org.             Medication List: This is a list of all your medications and when to take them. Check marks below indicate your daily home schedule. Keep this list as a reference.      Medications           Morning Afternoon Evening Bedtime As Needed    cyanocobalamin 1000 MCG/ML injection   Commonly known as:  VITAMIN B12   Inject 1 mL as directed every 30 days.                                mercaptopurine 50 MG tablet CHEMO   Commonly known as:  PURINETHOL   Take 100 mg by mouth At Bedtime   Last time this was given:  100 mg on 1/18/2018 10:10 PM                                pantoprazole 40 MG EC tablet   Commonly known as:  PROTONIX   Take 1 tablet (40 mg) by mouth 2 times daily (before meals)   Start taking on:  1/20/2018

## 2018-01-17 NOTE — ED PROVIDER NOTES
History     Chief Complaint:  Abdominal Pain    HPI   Mercedes Arita is a 52 year old female with a history of crohn's disease who presents to the ED for evaluation of abdominal pain. The patient reports that she has a history of 3 bowel resections, and in October, she began having extreme right sided abdominal pain. She was seen by [facility] doctor, who completed a colonoscopy which was normal. She also had an endoscopy but they were unable to complete it due to duodenal stricture and she notes her pain worsened after the endoscopy. Her pain has continued and has been worsening especially for the past few days, so she presented to [facility] today.  She is scheduled her for a fluoroscopy here tomorrow with possible EGD and dilation of the duodenal stricture, and she was referred her to the ED given her amount of pain with concern for increased pain due to peptic ulcer versus Crohns. Here in the ED, she rates her pain an 8/10, and does not take any medications to relieve the pain. She denies any urinary symptoms, but does have some nausea. Of note, her GI is Dr. Chevy Fonseca of the [facility] [facility].    Allergies:  Contrast dye    Medications:    Purinethol  Cyanocobalamin    Past Medical History:    Chronic pain  Crohn's disease  Neuropathy    Past Surgical History:    Cholecystectomy  Rectum dilation  Bowel resection  Hernia repair  Ileocecal resection  Exploratory laparotomy  Mohs Micrographic procedure  Carpal tunnel surgery    Family History:    No past pertinent family history.    Social History:  Marital Status:  Single [1]  Negative for tobacco use.  Rare alcohol use    Review of Systems   Gastrointestinal: Positive for abdominal pain and nausea.   Genitourinary: Negative for dysuria, frequency and hematuria.   All other systems reviewed and are negative.    Physical Exam     Patient Vitals for the past 24 hrs:   BP Temp Temp src Heart Rate Resp SpO2 Height Weight   01/17/18 1613 144/81 97.9  F (36.6  C)  "Oral 86 16 98 % 1.575 m (5' 2\") 71.7 kg (158 lb)     Physical Exam  Nursing notes reviewed. Vitals reviewed.  General: Alert. Well kept.  Eyes:  Conjunctiva non-injected, non-icteric.  Neck/Throat: Moist mucous membranes, oropharynx clear without erythema or exudate. No cervical lymphadenopathy.  Normal voice.  Cardiac: Regular rhythm. Normal heart sounds with no murmur/rubs/click. Normal distal pulses.  Pulmonary: Clear and equal breath sounds bilaterally. No crackles/rales. No wheezing  Abdomen: Soft. Non-distended. Tender to palpation RUQ. No masses. No guarding or rebound.  Musculoskeletal: Normal gross range of motion of all 4 extremities.    Neurological: Alert and oriented x4.   Skin: Warm and dry without rashes or petechiae. Normal appearance of visualized exposed skin.  Psych: Affect normal. Good eye contact.    Emergency Department Course     Imaging:  Radiographic findings were communicated with the patient who voiced understanding of the findings.  CT Abdomen/Pelvis without IV contrast:   1. Moderate fluid within the colon could relate to an enteritis. No other acute abnormality.  2. Fatty liver, as per radiology.    Laboratory:  CBC: WBC: 6.8, HGB: 13.5, PLT: 221  CMP: ALT 82 (H), AST 50 (H), o/w WNL (Creatinine: 0.72)    Lipase: 140    Interventions:  1820 Zofran, 4 mg, IV injection   Dilaudid, 0.5 mg, IV injection   NS 1L IV  2008 NS 1L IV   Dilaudid, 0.5 mg, IV injection   Zofran, 4 mg, IV injection  Please see MAR for full list of medications administered in the ED.    Emergency Department Course:  Nursing notes and vitals reviewed. 1746 I performed an exam of the patient as documented above. I shared service with Teresa Alvarado MD.    IV inserted. Medicine administered as documented above. Blood drawn. This was sent to the lab for further testing, results above.    The patient was sent for an Abdomen/Pelvis CT while in the emergency department, findings above.     2020 I rechecked the patient and " discussed the results of her workup thus far.     2123  I consulted with Dr. Prescott of the hospitalist services. They are in agreement to accept the patient for admission.    Findings and plan explained to the Patient who consents to admission. Discussed the patient with Dr. Prescott, who will admit the patient to a medical bed for further monitoring, evaluation, and treatment.    Impression & Plan      Medical Decision Making:  Mercedes Arita is a 52 year old female who presents for evaluation of abdominal pain.  The patient was seen today by her gastroenterologist, Dr. Fonseca, and was scheduled for a fluoroscopy tomorrow. With concern for worsening pain she was encouraged to present to the ED for admission for pain management.  She had an endoscopy procedure done at the end of December, and notes worsening of her right-sided upper pain at that time.  They were unable to complete the endoscopy due to stricture, and so she presents for gastroscopy with dilation under fluoroscopy tomorrow.  Lab work today is unremarkable with no elevation in her white count, but has a mild elevation in her aminotransferases.  Her lipase is normal and her bili is normal today.  CT was obtained, without contrast secondary to her contrast allergy, and shows moderate fluid within the colon, possibly related to acute enteritis or acute abnormality.  She will be admitted for pain management and will have follow up with [place] [facility] tomorrow.    Diagnosis:    ICD-10-CM   1. Abdominal pain R10.9     Disposition:  Admitted to Dr. Prescott    I, Sandy Hinojosa, am serving as a scribe on 1/17/2018 at 5:54 PM to personally document services performed by Kae May CNP based on my observations and the provider's statements to me.     Sandy Hinojosa  1/17/2018    EMERGENCY DEPARTMENT       Kae May CNP  01/18/18 0127

## 2018-01-17 NOTE — ED NOTES
Pt states that she has a hx of Crohn's .  RUQ pain is typical for her pain with flare ups.  Calm and cooperative in room.

## 2018-01-18 ENCOUNTER — APPOINTMENT (OUTPATIENT)
Dept: GENERAL RADIOLOGY | Facility: CLINIC | Age: 53
End: 2018-01-18
Attending: INTERNAL MEDICINE
Payer: COMMERCIAL

## 2018-01-18 ENCOUNTER — ANESTHESIA (OUTPATIENT)
Dept: SURGERY | Facility: CLINIC | Age: 53
End: 2018-01-18
Payer: COMMERCIAL

## 2018-01-18 ENCOUNTER — ANESTHESIA EVENT (OUTPATIENT)
Dept: SURGERY | Facility: CLINIC | Age: 53
End: 2018-01-18
Payer: COMMERCIAL

## 2018-01-18 LAB
ALBUMIN SERPL-MCNC: 3.1 G/DL (ref 3.4–5)
ALP SERPL-CCNC: 67 U/L (ref 40–150)
ALT SERPL W P-5'-P-CCNC: 70 U/L (ref 0–50)
AST SERPL W P-5'-P-CCNC: 55 U/L (ref 0–45)
BILIRUB DIRECT SERPL-MCNC: <0.1 MG/DL (ref 0–0.2)
BILIRUB SERPL-MCNC: 0.6 MG/DL (ref 0.2–1.3)
PROT SERPL-MCNC: 7.4 G/DL (ref 6.8–8.8)

## 2018-01-18 PROCEDURE — 27210286 ZZH BALLOON ADDITIONAL: Performed by: INTERNAL MEDICINE

## 2018-01-18 PROCEDURE — 25000125 ZZHC RX 250: Performed by: NURSE ANESTHETIST, CERTIFIED REGISTERED

## 2018-01-18 PROCEDURE — 25000128 H RX IP 250 OP 636: Performed by: NURSE ANESTHETIST, CERTIFIED REGISTERED

## 2018-01-18 PROCEDURE — 40000170 ZZH STATISTIC PRE-PROCEDURE ASSESSMENT II: Performed by: INTERNAL MEDICINE

## 2018-01-18 PROCEDURE — 99225 ZZC SUBSEQUENT OBSERVATION CARE,LEVEL II: CPT | Performed by: HOSPITALIST

## 2018-01-18 PROCEDURE — 76000 FLUOROSCOPY <1 HR PHYS/QHP: CPT

## 2018-01-18 PROCEDURE — 37000009 ZZH ANESTHESIA TECHNICAL FEE, EACH ADDTL 15 MIN: Performed by: INTERNAL MEDICINE

## 2018-01-18 PROCEDURE — 25000132 ZZH RX MED GY IP 250 OP 250 PS 637: Performed by: NURSE PRACTITIONER

## 2018-01-18 PROCEDURE — 25000125 ZZHC RX 250: Performed by: PHYSICIAN ASSISTANT

## 2018-01-18 PROCEDURE — 25000128 H RX IP 250 OP 636: Performed by: PHYSICIAN ASSISTANT

## 2018-01-18 PROCEDURE — C1769 GUIDE WIRE: HCPCS | Performed by: INTERNAL MEDICINE

## 2018-01-18 PROCEDURE — 25000128 H RX IP 250 OP 636: Performed by: NURSE PRACTITIONER

## 2018-01-18 PROCEDURE — 71000012 ZZH RECOVERY PHASE 1 LEVEL 1 FIRST HR: Performed by: INTERNAL MEDICINE

## 2018-01-18 PROCEDURE — 36415 COLL VENOUS BLD VENIPUNCTURE: CPT | Performed by: NURSE PRACTITIONER

## 2018-01-18 PROCEDURE — 80076 HEPATIC FUNCTION PANEL: CPT | Performed by: NURSE PRACTITIONER

## 2018-01-18 PROCEDURE — 37000008 ZZH ANESTHESIA TECHNICAL FEE, 1ST 30 MIN: Performed by: INTERNAL MEDICINE

## 2018-01-18 PROCEDURE — 99207 ZZC CDG-CODE CATEGORY CHANGED: CPT | Performed by: HOSPITALIST

## 2018-01-18 PROCEDURE — G0378 HOSPITAL OBSERVATION PER HR: HCPCS

## 2018-01-18 PROCEDURE — 96376 TX/PRO/DX INJ SAME DRUG ADON: CPT

## 2018-01-18 PROCEDURE — 36000052 ZZH SURGERY LEVEL 2 EA 15 ADDTL MIN: Performed by: INTERNAL MEDICINE

## 2018-01-18 PROCEDURE — 25000125 ZZHC RX 250: Performed by: NURSE PRACTITIONER

## 2018-01-18 PROCEDURE — 36000050 ZZH SURGERY LEVEL 2 1ST 30 MIN: Performed by: INTERNAL MEDICINE

## 2018-01-18 RX ORDER — NALOXONE HYDROCHLORIDE 0.4 MG/ML
.1-.4 INJECTION, SOLUTION INTRAMUSCULAR; INTRAVENOUS; SUBCUTANEOUS
Status: DISCONTINUED | OUTPATIENT
Start: 2018-01-18 | End: 2018-01-19

## 2018-01-18 RX ORDER — ONDANSETRON 2 MG/ML
4 INJECTION INTRAMUSCULAR; INTRAVENOUS EVERY 30 MIN PRN
Status: DISCONTINUED | OUTPATIENT
Start: 2018-01-18 | End: 2018-01-18

## 2018-01-18 RX ORDER — ALBUTEROL SULFATE 0.83 MG/ML
2.5 SOLUTION RESPIRATORY (INHALATION) EVERY 4 HOURS PRN
Status: DISCONTINUED | OUTPATIENT
Start: 2018-01-18 | End: 2018-01-18 | Stop reason: HOSPADM

## 2018-01-18 RX ORDER — LABETALOL HYDROCHLORIDE 5 MG/ML
10 INJECTION, SOLUTION INTRAVENOUS
Status: DISCONTINUED | OUTPATIENT
Start: 2018-01-18 | End: 2018-01-18 | Stop reason: HOSPADM

## 2018-01-18 RX ORDER — SODIUM CHLORIDE, SODIUM LACTATE, POTASSIUM CHLORIDE, CALCIUM CHLORIDE 600; 310; 30; 20 MG/100ML; MG/100ML; MG/100ML; MG/100ML
INJECTION, SOLUTION INTRAVENOUS CONTINUOUS
Status: DISCONTINUED | OUTPATIENT
Start: 2018-01-18 | End: 2018-01-18

## 2018-01-18 RX ORDER — PROPOFOL 10 MG/ML
INJECTION, EMULSION INTRAVENOUS CONTINUOUS PRN
Status: DISCONTINUED | OUTPATIENT
Start: 2018-01-18 | End: 2018-01-18

## 2018-01-18 RX ORDER — MEPERIDINE HYDROCHLORIDE 25 MG/ML
12.5 INJECTION INTRAMUSCULAR; INTRAVENOUS; SUBCUTANEOUS
Status: DISCONTINUED | OUTPATIENT
Start: 2018-01-18 | End: 2018-01-18

## 2018-01-18 RX ORDER — PROPOFOL 10 MG/ML
INJECTION, EMULSION INTRAVENOUS PRN
Status: DISCONTINUED | OUTPATIENT
Start: 2018-01-18 | End: 2018-01-18

## 2018-01-18 RX ORDER — HYDROMORPHONE HYDROCHLORIDE 1 MG/ML
.3-.5 INJECTION, SOLUTION INTRAMUSCULAR; INTRAVENOUS; SUBCUTANEOUS EVERY 10 MIN PRN
Status: DISCONTINUED | OUTPATIENT
Start: 2018-01-18 | End: 2018-01-18

## 2018-01-18 RX ORDER — LIDOCAINE 40 MG/G
CREAM TOPICAL
Status: DISCONTINUED | OUTPATIENT
Start: 2018-01-18 | End: 2018-01-18

## 2018-01-18 RX ORDER — LIDOCAINE HYDROCHLORIDE 20 MG/ML
INJECTION, SOLUTION INFILTRATION; PERINEURAL PRN
Status: DISCONTINUED | OUTPATIENT
Start: 2018-01-18 | End: 2018-01-18

## 2018-01-18 RX ORDER — ONDANSETRON 4 MG/1
4 TABLET, ORALLY DISINTEGRATING ORAL EVERY 30 MIN PRN
Status: DISCONTINUED | OUTPATIENT
Start: 2018-01-18 | End: 2018-01-18

## 2018-01-18 RX ORDER — FLUMAZENIL 0.1 MG/ML
0.2 INJECTION, SOLUTION INTRAVENOUS
Status: ACTIVE | OUTPATIENT
Start: 2018-01-18 | End: 2018-01-19

## 2018-01-18 RX ORDER — FENTANYL CITRATE 50 UG/ML
INJECTION, SOLUTION INTRAMUSCULAR; INTRAVENOUS PRN
Status: DISCONTINUED | OUTPATIENT
Start: 2018-01-18 | End: 2018-01-18

## 2018-01-18 RX ORDER — FENTANYL CITRATE 50 UG/ML
25-50 INJECTION, SOLUTION INTRAMUSCULAR; INTRAVENOUS
Status: DISCONTINUED | OUTPATIENT
Start: 2018-01-18 | End: 2018-01-18 | Stop reason: HOSPADM

## 2018-01-18 RX ORDER — DEXAMETHASONE SODIUM PHOSPHATE 4 MG/ML
INJECTION, SOLUTION INTRA-ARTICULAR; INTRALESIONAL; INTRAMUSCULAR; INTRAVENOUS; SOFT TISSUE PRN
Status: DISCONTINUED | OUTPATIENT
Start: 2018-01-18 | End: 2018-01-18

## 2018-01-18 RX ORDER — NALOXONE HYDROCHLORIDE 0.4 MG/ML
.1-.4 INJECTION, SOLUTION INTRAMUSCULAR; INTRAVENOUS; SUBCUTANEOUS
Status: DISCONTINUED | OUTPATIENT
Start: 2018-01-18 | End: 2018-01-18

## 2018-01-18 RX ORDER — SODIUM CHLORIDE 9 MG/ML
INJECTION, SOLUTION INTRAVENOUS CONTINUOUS PRN
Status: DISCONTINUED | OUTPATIENT
Start: 2018-01-18 | End: 2018-01-18

## 2018-01-18 RX ORDER — HYDRALAZINE HYDROCHLORIDE 20 MG/ML
2.5-5 INJECTION INTRAMUSCULAR; INTRAVENOUS EVERY 10 MIN PRN
Status: DISCONTINUED | OUTPATIENT
Start: 2018-01-18 | End: 2018-01-18 | Stop reason: HOSPADM

## 2018-01-18 RX ORDER — LIDOCAINE 40 MG/G
CREAM TOPICAL
Status: DISCONTINUED | OUTPATIENT
Start: 2018-01-18 | End: 2018-01-19 | Stop reason: HOSPADM

## 2018-01-18 RX ADMIN — MERCAPTOPURINE 100 MG: 50 TABLET ORAL at 22:10

## 2018-01-18 RX ADMIN — Medication 0.3 MG: at 08:43

## 2018-01-18 RX ADMIN — DEXMEDETOMIDINE HYDROCHLORIDE 8 MCG: 100 INJECTION, SOLUTION INTRAVENOUS at 13:37

## 2018-01-18 RX ADMIN — SODIUM CHLORIDE: 9 INJECTION, SOLUTION INTRAVENOUS at 00:02

## 2018-01-18 RX ADMIN — DEXMEDETOMIDINE HYDROCHLORIDE 8 MCG: 100 INJECTION, SOLUTION INTRAVENOUS at 13:59

## 2018-01-18 RX ADMIN — ONDANSETRON 4 MG: 4 TABLET, ORALLY DISINTEGRATING ORAL at 00:02

## 2018-01-18 RX ADMIN — SODIUM CHLORIDE: 9 INJECTION, SOLUTION INTRAVENOUS at 16:48

## 2018-01-18 RX ADMIN — PROPOFOL 20 MG: 10 INJECTION, EMULSION INTRAVENOUS at 13:49

## 2018-01-18 RX ADMIN — PHENYLEPHRINE HYDROCHLORIDE 100 MCG: 10 INJECTION INTRAVENOUS at 14:13

## 2018-01-18 RX ADMIN — PROPOFOL 10 MG: 10 INJECTION, EMULSION INTRAVENOUS at 13:51

## 2018-01-18 RX ADMIN — FENTANYL CITRATE 50 MCG: 50 INJECTION, SOLUTION INTRAMUSCULAR; INTRAVENOUS at 13:34

## 2018-01-18 RX ADMIN — PROPOFOL 100 MCG/KG/MIN: 10 INJECTION, EMULSION INTRAVENOUS at 13:41

## 2018-01-18 RX ADMIN — SODIUM CHLORIDE 8 MG/HR: 9 INJECTION, SOLUTION INTRAVENOUS at 17:37

## 2018-01-18 RX ADMIN — LIDOCAINE HYDROCHLORIDE 60 MG: 20 INJECTION, SOLUTION INFILTRATION; PERINEURAL at 13:40

## 2018-01-18 RX ADMIN — MERCAPTOPURINE 100 MG: 50 TABLET ORAL at 03:25

## 2018-01-18 RX ADMIN — DEXAMETHASONE SODIUM PHOSPHATE 4 MG: 4 INJECTION, SOLUTION INTRA-ARTICULAR; INTRALESIONAL; INTRAMUSCULAR; INTRAVENOUS; SOFT TISSUE at 13:53

## 2018-01-18 RX ADMIN — SODIUM CHLORIDE: 9 INJECTION, SOLUTION INTRAVENOUS at 13:18

## 2018-01-18 RX ADMIN — Medication 0.3 MG: at 11:39

## 2018-01-18 RX ADMIN — ACETAMINOPHEN 650 MG: 325 TABLET, FILM COATED ORAL at 17:51

## 2018-01-18 RX ADMIN — SODIUM CHLORIDE: 9 INJECTION, SOLUTION INTRAVENOUS at 09:58

## 2018-01-18 RX ADMIN — DEXMEDETOMIDINE HYDROCHLORIDE 8 MCG: 100 INJECTION, SOLUTION INTRAVENOUS at 13:33

## 2018-01-18 RX ADMIN — MIDAZOLAM 2 MG: 1 INJECTION INTRAMUSCULAR; INTRAVENOUS at 13:31

## 2018-01-18 ASSESSMENT — ENCOUNTER SYMPTOMS
SEIZURES: 0
DYSRHYTHMIAS: 0

## 2018-01-18 ASSESSMENT — PAIN DESCRIPTION - DESCRIPTORS
DESCRIPTORS: ACHING
DESCRIPTORS: SORE
DESCRIPTORS: ACHING

## 2018-01-18 ASSESSMENT — COPD QUESTIONNAIRES: COPD: 0

## 2018-01-18 ASSESSMENT — LIFESTYLE VARIABLES: TOBACCO_USE: 0

## 2018-01-18 NOTE — PROGRESS NOTES
RECEIVING UNIT ED HANDOFF REVIEW    ED Nurse Handoff Report was reviewed by: Mely Simons on January 17, 2018 at 9:48 PM

## 2018-01-18 NOTE — ED NOTES
"[facility]  ED Nurse Handoff Report    ED Chief complaint: Abdominal Pain (Patient in with complaints of right upper abdominal pain. States history of Crohn's. States having a fluoroscopy here tomorrow. GI doc sent patient to ED because she was having so much pain.)      ED Diagnosis:   Final diagnoses:   Abdominal pain       Code Status: Full Code    Allergies:   Allergies   Allergen Reactions     Contrast Dye Other (See Comments)     Sneezed once. This was ten years ago and pt doesn't recall any other symptoms.       Activity level - Baseline/Home:  Independent    Activity Level - Current:   Independent     Needed?: No    Isolation: No  Infection: Not Applicable    Bariatric?: No    Vital Signs:   Vitals:    01/17/18 1613 01/17/18 2135 01/17/18 2136   BP: 144/81 110/73    Resp: 16     Temp: 97.9  F (36.6  C)     TempSrc: Oral     SpO2: 98%  96%   Weight: 71.7 kg (158 lb)     Height: 1.575 m (5' 2\")         Cardiac Rhythm: ,        Pain level: 0-10 Pain Scale: 1    Is this patient confused?: No    Patient Report: Initial Complaint: Mercedes Arita is a 52 year old female with a history of crohn's disease who presents to the ED for evaluation of abdominal pain. The patient reports that she has a history of 3 bowel resections, and in October, she began having extreme right sided abdominal pain. She was seen by a GI doctor, who did an endoscopy and colonoscopy, both of which were normal. Her pain has continued and has been worsening especially for the past few days, so she presented to the GI clinic. Her GI doctor scheduled her for a fluoroscopy here tomorrow, and referred her to the ED given her amount of pain. Here in the ED, she rates her pain an 8/10, and does not take any medications to relieve the pain. She denies any urinary symptoms, but does have some nausea. Of note, her GI is Dr. Chevy Fonseca of the [facility].  Focused Assessment: RUQ abdominal pain, history of " Crohn's.  Tests Performed: labs, CT abdominal  Abnormal Results:   Results for orders placed or performed during the hospital encounter of 01/17/18 (from the past 24 hour(s))   CBC with platelets differential   Result Value Ref Range    WBC 6.8 4.0 - 11.0 10e9/L    RBC Count 4.73 3.8 - 5.2 10e12/L    Hemoglobin 13.5 11.7 - 15.7 g/dL    Hematocrit 40.0 35.0 - 47.0 %    MCV 85 78 - 100 fl    MCH 28.5 26.5 - 33.0 pg    MCHC 33.8 31.5 - 36.5 g/dL    RDW 18.4 (H) 10.0 - 15.0 %    Platelet Count 221 150 - 450 10e9/L    Diff Method Automated Method     % Neutrophils 62.6 %    % Lymphocytes 29.1 %    % Monocytes 6.2 %    % Eosinophils 1.2 %    % Basophils 0.6 %    % Immature Granulocytes 0.3 %    Nucleated RBCs 0 0 /100    Absolute Neutrophil 4.3 1.6 - 8.3 10e9/L    Absolute Lymphocytes 2.0 0.8 - 5.3 10e9/L    Absolute Monocytes 0.4 0.0 - 1.3 10e9/L    Absolute Eosinophils 0.1 0.0 - 0.7 10e9/L    Absolute Basophils 0.0 0.0 - 0.2 10e9/L    Abs Immature Granulocytes 0.0 0 - 0.4 10e9/L    Absolute Nucleated RBC 0.0    Comprehensive metabolic panel   Result Value Ref Range    Sodium 138 133 - 144 mmol/L    Potassium 3.6 3.4 - 5.3 mmol/L    Chloride 104 94 - 109 mmol/L    Carbon Dioxide 25 20 - 32 mmol/L    Anion Gap 9 3 - 14 mmol/L    Glucose 77 70 - 99 mg/dL    Urea Nitrogen 11 7 - 30 mg/dL    Creatinine 0.72 0.52 - 1.04 mg/dL    GFR Estimate 85 >60 mL/min/1.7m2    GFR Estimate If Black >90 >60 mL/min/1.7m2    Calcium 9.0 8.5 - 10.1 mg/dL    Bilirubin Total 0.5 0.2 - 1.3 mg/dL    Albumin 3.7 3.4 - 5.0 g/dL    Protein Total 8.6 6.8 - 8.8 g/dL    Alkaline Phosphatase 86 40 - 150 U/L    ALT 82 (H) 0 - 50 U/L    AST 50 (H) 0 - 45 U/L   Lipase   Result Value Ref Range    Lipase 140 73 - 393 U/L   CT Abdomen Pelvis without Contrast (stone protocol)    Narrative    CT ABDOMEN AND PELVIS WITHOUT CONTRAST   1/17/2018 6:43 PM     HISTORY: Abdominal pain.    TECHNIQUE: Noncontrast CT abdomen and pelvis was performed. Radiation  dose for  this scan was reduced using automated exposure control,  adjustment of the mA and/or kV according to patient size, or iterative  reconstruction technique.    COMPARISON: CT abdomen and pelvis 10/20/2017.    FINDINGS: No urolithiasis or hydronephrosis. No acute renal  abnormality. Fatty liver. Status post cholecystectomy. Adrenals,  spleen, and pancreas show no acute abnormalities.    Moderate fluid within the colon. Postoperative change of the proximal  colon appears patent. No small bowel obstruction. No free air or free  fluid.      Impression    IMPRESSION:  1. Moderate fluid within the colon could relate to an enteritis. No  other acute abnormality.  2. Fatty liver.       Treatments provided: IV Dilaudid for pain management, IV fluids infusing, monitoring    Family Comments: no family here    OBS brochure/video discussed/provided to patient: No    ED Medications:   Medications   0.9% sodium chloride BOLUS (0 mLs Intravenous Stopped 1/17/18 2008)     Followed by   0.9% sodium chloride infusion (1,000 mLs Intravenous New Bag 1/17/18 2008)   HYDROmorphone (PF) (DILAUDID) injection 0.5 mg (0.5 mg Intravenous Given 1/17/18 2008)   0.9% sodium chloride infusion (not administered)   ondansetron (ZOFRAN) injection 4 mg (4 mg Intravenous Given 1/17/18 1820)       Drips infusing?:  Yes      ED NURSE PHONE NUMBER: *[id number]

## 2018-01-18 NOTE — ANESTHESIA CARE TRANSFER NOTE
Patient: Mercedes SWIFT Lyla    Procedure(s):  GASTROSCOPY WITH DILATION UNDER FLUOROSCOPY (MAC SEDATION)     Diagnosis: VOMITTING  Diagnosis Additional Information: No value filed.    Anesthesia Type:   MAC     Note:  Airway :Face Mask  Patient transferred to:PACU  Comments: Pt exhibits spont resps, all monitors and alarms on in pacu, report given to RN, vss.Handoff Report: Identifed the Patient, Identified the Reponsible Provider, Reviewed the pertinent medical history, Discussed the surgical course, Reviewed Intra-OP anesthesia mangement and issues during anesthesia, Set expectations for post-procedure period and Allowed opportunity for questions and acknowledgement of understanding      Vitals: (Last set prior to Anesthesia Care Transfer)    CRNA VITALS  1/18/2018 1352 - 1/18/2018 1428      1/18/2018             Ht Rate: 80    Resp Rate (set): 10                Electronically Signed By: RASHIDA Nguyen CRNA  January 18, 2018  2:28 PM

## 2018-01-18 NOTE — CONSULTS
[facility]  Gastroenterology Consultation    Mercedes Arita  [address] [address] [place] [address]  52 year old female    Admission Date/Time: 1/17/2018  Primary Care Provider: Chevy Fonseca    We were asked to see the patient in consultation by Vernon Ontiveros CNP for evaluation of RUQ pain.        HPI:  Mercedes Arita is a 52 year old female who has a PMH significant for fibrostenotic, ileocolonic Crohn's disease and a history of basal cell carcinoma who was admitted for RUQ pain.      The patient is well known to [facility], and is followed by Dr. Fonseca.  Please see his note below for a complete and detailed history on the patient.  She was seen yesterday on 1/17/18 by him in clinic.      She is admitted due to RUQ pain which worsens with eating.  She had a recent endoscopy which revealed a possible duodenal stricture.  Biopsies were inconclusive but did raise the possibility of Crohn's disease.  She denies any NSAID medications.  She takes Excedrin but this is rare.  She denies any nausea or vomiting.  She has been having bowel movements without issue.        Mercedes is a pleasant 52-year-old female. She returns for followup regarding her Crohn's disease. Unfortunately, she has had progression of this right upper quadrant pain. It is constant. She has a complete liquid diet. She may have less pain, but still present. It is exacerbated by eating. It is in the right upper quadrant. It does radiate around to her back. There is no vomiting, no heartburn. This started in late October, but it has progressed. It is now a 7/10. It increases with movement. She is having formed stool daily, no significant change in her bowel habits. No black or bloody stools.    The patient's ileal and perianal Crohn's disease was diagnosed in 1983. Note, focal active colitis on biopsies with normal colon in 2012. She has a fibrostenotic phenotype. 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    SURGERIES  Ileocolonic resection, 1987: A 2-1/2 feet of ileum and 6 inches of right colon were removed. This was for obstruction.  Ileal resection, 1996: A few inches removed for recurrent disease.  Ileocolic resection, September 2012 (Dr. Macias).  History of ventral hernia repairs x3 with mesh placement.    ENDOSCOPIES  Colonoscopy, February 2014: After being on 6-MP, mild-to-moderate (I2), improved from I3 disease in 2013. There are aphthous ulcers in the ileum. Ileocolonic anastomosis in the ascending colon. Moderate anal canal stenosis dilated with digital exam. Ileal biopsy showed chronic, active ileitis consistent with Crohn's.  Upper endoscopy, 12/19/2017: LA grade A reflux esophagitis. The pylorus is scarred and wide open. There is a stricture in the distal duodenal bulb. Scope would not pass. The tissue present is edematous and friable. Biopsy forceps were threaded through the stricture and two blind biopsies were taken, label duodenum. Biopsies were also taken of the structure itself.  Duodenal biopsies were normal with mild regenerative changes. Duodenal stricture biopsies showed moderate active duodenitis nonspecific, but differential diagnoses includes Crohn's, peptic versus NSAID-related injury. G junction biopsy showed erosive esophagitis consistent with reflux.  Colonoscopy, 12/19/2017: Poor prep.  Colonoscopy, 12/20/2017: Prep was fair. They were unable to intubate the ileum. There was difficulty due to a long, redundant and tortuous colon. Not able to intubate TI, but it did not appear inflamed. Due to the redundant procedure, unable to intubate TI.  Random colon biopsies were normal.    IMAGING  Abdominal ultrasound, December 12, 2012: Diffuse fatty infiltration.  Abdominopelvic CT scan with contrast done October 20, 2017: Postoperative changes from ileocecal surgery. Ileum is widely patent at the small bowel colon junction. The colon is mildly dilated in this region. No evidence of  obstruction. There is a new area of inflammation in the proximal duodenum in the region of the duodenal bulb and adjacent to the pylorus. There is a mild small bowel dilation involving the C loop. No evidence of obstruction.    TREATMENT  Infliximab in 2012, given induction treatment only prior to her surgery and she did not want to restart this after her surgery. The patient has been extremely hesitant to start any biologic treatment.  Current Treatment: 6- mg per day, TPMT 27.3, August 2012. The patient was on this prior to her first surgery, as well as her third surgery, but stopped due to financial reasons in July 2014, restarted in April 2015.    HEALTHCARE MAINTENANCE  Influenza vaccine yearly. Pneumococcal vaccine done twice, last 2016. Prevnar done in 2016. Hepatitis A and B vaccinations done in 2012. Chickenpox as a child.  QuantiFERON negative in 2015.  B12 normal, she is on injection replacement, May 2017.  Vitamin D low at 19.5, May 2017, she is on 4000 units per day for replacement.    OTHER  The patient has a history of basal cell cancer in 2010. She sees dermatologist on a regular basis.  History of PRINLGE with stage 1 fibrosis, evaluated in [facility] in 2013. She was advised to increase her exercise, pursue healthy diet, and refer back to the clinic if her LFTs were greater than three times upper limits of normal on her Crohn's treatment. Interestingly, her LFTs typically normalized when she is active and increased when she stops exercising.      ROS: A comprehensive ten point review of systems was negative aside from those in mentioned in the HPI.      MEDICATIONS:   No current facility-administered medications on file prior to encounter.   Current Outpatient Prescriptions on File Prior to Encounter:  mercaptopurine (PURINETHOL) 50 MG tablet Take 100 mg by mouth At Bedtime    cyanocobalamin 1000 MCG/ML injection Inject 1 mL as directed every 30 days.       ALLERGIES:   Allergies   Allergen  "Reactions     Contrast Dye Other (See Comments)     Sneezed once. This was ten years ago and pt doesn't recall any other symptoms.       Past Medical History:   Diagnosis Date     Chronic pain     Abdominal pain     Crohn's     Chrons     Crohn's disease (H)     diagnosed 1983     PONV (postoperative nausea and vomiting)        Past Surgical History:   Procedure Laterality Date     CHOLECYSTECTOMY       DILATE RECTUM  9/19/2012    Procedure: DILATE RECTUM;  Exam Under Anesthesia, Dilation with Stricture, lysis of adhesions, Laparoscopic hand assisted Ileocolic Resection ;  Surgeon: Magy Macias MD;  Location: [facility] OR     GI SURGERY  1987,1996,2012    bowel resection times 3 ileo-colonic resections     HERNIA REPAIR      Ventral times 2 with mesh.     LAPAROSCOPIC RESECTION ILEOCECAL  9/19/2012    Procedure: LAPAROSCOPIC RESECTION ILEOCECAL;;  Surgeon: Magy Macias MD;  Location: [facility] OR     LAPAROTOMY EXPLORATORY      For removal of adhesions.     MOHS MICROGRAPHIC PROCEDURE      Removal of skin cancer on face.     ORTHOPEDIC SURGERY      carpal tunnel         SOCIAL HISTORY:  Social History   Substance Use Topics     Smoking status: Never Smoker     Smokeless tobacco: Never Used     Alcohol use Yes      Comment: 2 times yearly.       FAMILY HISTORY:  No family history on file.    PHYSICAL EXAM:   /74 (BP Location: Right arm)  Temp 97.3  F (36.3  C) (Oral)  Resp 16  Ht 1.585 m (5' 2.4\")  Wt 73.1 kg (161 lb 3.2 oz)  SpO2 95%  Breastfeeding? No  BMI 29.11 kg/m2    Constitutional: NAD, comfortable  Cardiovascular: RRR, normal S1 and S2, no r/c/g/m  Respiratory: CTAB  Psychiatric: mentation appears normal and affect normal  Head: Normocephalic. Atraumatic.    Neck: Trachea midline  Eyes:  PERRL, no icterus  ENT: Hearing adequate, pharynx normal without erythema or exudate  Abdomen:   Auscultation: +BS  Appearance: normal  Palpation: soft, some TTP in the RUQ but no guarding or rebound, " nondistended  NEURO: grossly negative  SKIN: no suspicious lesions or rashes          ADDITIONAL COMMENTS:   I reviewed the patient's new clinical lab test results.   Recent Labs   Lab Test  01/17/18   1826  10/20/17   1819  08/26/13   0955 08/20/13 09/22/12   0611  09/21/12   0715  09/20/12   0700   WBC  6.8  7.1   --    --    --    --   14.5*   HGB  13.5  10.8*   --    --    --   8.8*  9.6*   MCV  85  91   --    --    --    --   87   PLT  221  323   --    --   213   --   230   INR   --    --   1.03  1.0   --    --    --      Recent Labs   Lab Test  01/17/18   1826  10/20/17   1819  09/20/12   0700   NA  138  135  137   POTASSIUM  3.6  3.8  3.9   CHLORIDE  104  101  105   CO2  25  26  26   BUN  11  12  4*   CR  0.72  0.73  0.58   ANIONGAP  9  8  6   TRAVIS  9.0  9.1  7.3*   GLC  77  101*  117*     Recent Labs   Lab Test  01/18/18 0719  01/17/18   1826  10/20/17   1819  02/03/16 10/09/15   08/21/12   1620  08/21/12   1600   ALBUMIN  3.1*  3.7  3.6   --   3.6  3.3   < >  4.6   --    BILITOTAL  0.6  0.5  0.6   --   0.2  0.5   < >  0.8   --    DBIL  <0.1   --    --    --   0.1  0.1   < >   --    --    ALT  70*  82*  101*   < >  69  108   < >  29   --    AST  55*  50*  51*   < >  37  41   < >  43   --    ALKPHOS  67  86  82   --   92  89   < >  91   --    PROTEIN   --    --    --    --    --    --    --    --   Negative   LIPASE   --   140  225   --    --    --    --   132   --     < > = values in this interval not displayed.             .    CONSULTATION ASSESSMENT AND PLAN:    Active Problems:  Crohn disease (H)  RUQ pain    Assessment: 52 year old female with fibrostenotic, ileocolonic Crohn's disease admitted for further evaluation of a possible stricture in the duodenum on recent endoscopy leading to increased RUQ pain with eating.  No nausea or vomiting noted.      Plan:   -EGD today under fluoro and MAC at 1:40  -Further recommendations will follow this procedure (may need IV steroids, remicade, etc but hold for  "now).       I discussed the patient's findings and plan with Dr. Langley who will independently examine the patient and add further recommendations as necessary.          JESUSITA Dillon  [facility]  Office:  [phone] call if needed after 5PM  Cell:  [phone], not available after 5PM at this number      Staff addendum: 52 yof  With hx of ileocolonic Crohn's admitted with RUQ pain. Had EGD last month showing duodenal stricture. Pain present x 3 months. No nausea or emesis. BM have been normal    /69 (BP Location: Right arm)  Temp 97.4  F (36.3  C) (Oral)  Resp 16  Ht 1.585 m (5' 2.4\")  Wt 73.1 kg (161 lb 3.2 oz)  SpO2 94%  Breastfeeding? No  BMI 29.11 kg/m2  Gen: awake alert NAD  Abd: S NT ND +bs    A/P: 52 yof with RUQ pain, Crohn's and duodenal stricture. Likley peptic in nature  -EGD with dilation today  -NPO  -PPI    Aaliyah Langley MD  [facility]  Pager [phone]  Office [phone]    "

## 2018-01-18 NOTE — PHARMACY-ADMISSION MEDICATION HISTORY
Admission medication history interview status for the 1/17/2018  admission is complete. See EPIC admission navigator for prior to admission medications     Medication history source reliability:Good    Actions taken by pharmacist (provider contacted, etc):None     Additional medication history information not noted on PTA med list :None    Medication reconciliation/reorder completed by provider prior to medication history? No    Time spent in this activity: 10 mins    Prior to Admission medications    Medication Sig Last Dose Taking? Auth Provider   mercaptopurine (PURINETHOL) 50 MG tablet Take 100 mg by mouth At Bedtime  1/16/2018 at hs Yes Reported, Patient   cyanocobalamin 1000 MCG/ML injection Inject 1 mL as directed every 30 days. Next dose due in Feb 2018 (couldn't specify date) at - Yes Reported, Patient

## 2018-01-18 NOTE — PLAN OF CARE
Problem: Patient Care Overview  Goal: Plan of Care/Patient Progress Review  Admitted to floor from ED around 10pm. VSS. Pain in abdomen 8/10- given dilaudid PRN x1. Plan for GI follow up tomorrow. Clear liquid diet, NPO at midnight. Denies nausea. Up with SBA. Chemo precautions.

## 2018-01-18 NOTE — PLAN OF CARE
Problem: Patient Care Overview  Goal: Plan of Care/Patient Progress Review  Outcome: No Change  A&Ox4. Up SBA. NPO after midnight ex for meds and ice chips. VSS. R upper abdominal pain rated 4/10, declines need for pain meds or other interventions at this time. Did complain of slight nausea at beginning of shift- PRN Zofran given x1. Plan is to manage pain, GI follow up today and possible fluoroscopy today. Will continue to monitor.

## 2018-01-18 NOTE — ED PROVIDER NOTES
Emergency Department Attending Supervision Note  1/18/2018  12:24 AM      I evaluated this patient in conjunction with Kae May CNP and agree with her assesment and plan.      Briefly, the patient presented with ongoing abdominal pain and trouble eating.      On my exam, she has a benign abdominal exam and appears comfortable.    Results:  CT unremarkable.    ED course:  Admitted to Medicine, GI plans to perform endoscopy and dilation of duodenal stricture tomorrow morning.    My impression is Duodenal stricture.        Diagnosis    ICD-10-CM    1. Abdominal pain R10.9      Patient evaluated by myself in conjunction with Kae May CNP.       Teresa Alvarado MD  01/18/18 0027

## 2018-01-18 NOTE — H&P
DATE OF SERVICE:  01/17/2018.        PRIMARY CARE PROVIDER:  Dr. Ginny Rodgers.        GASTROENTEROLOGIST:  Dr. Chevy Fonseca with [facility].      CHIEF COMPLAINT:  Abdominal pain.      HISTORY OF PRESENT ILLNESS:  Ms. Mercedes Arita is a pleasant 52-year-old female with past medical history significant for Crohn's disease diagnosed 35 years ago and chronic pain related to such, who presents today with worsening right lower quadrant pain since 10/2017.        The patient follows with [facility] as an outpatient, and after seeing Dr. Fonseca in clinic today to review results of the colonoscopy and endoscopy completed recently, it was advised that the patient be admitted to the hospital for pain management and will undergo a fluoroscopy tomorrow.        The patient reports that her Crohn's disease has been well managed for the past 5 years which was her last bowel resection.  The patient has not required steroids or antibiotics over the last 5 years.      Presently, patient is seen in the emergency department.  The patient reports right lower quadrant pain that does not radiate.  While in the emergency department, the patient received 1 liter normal saline fluid, Dilaudid IV and Zofran for prophylaxis.  The patient underwent a CT of the abdomen and pelvis without contrast which noted moderate fluid within the colon which could relate to possible enteritis and a fatty liver.  Also, of note, her ALT was elevated at 82 and her AST was elevated at 50.  The patient's WBC is within normal limits, and no fevers reported.  Presently, the patient reports no chest pain, shortness of breath, nausea, vomiting, diarrhea, constipation, fevers, chills or recent upper respiratory illnesses.      PAST MEDICAL HISTORY:     1.  Crohn's disease, diagnosed 35 years ago.   2.  Chronic pain related to Crohn's disease.   3.  Basal cell carcinoma.      PAST SURGICAL HISTORY:   1.  Bowel resection x 3.   2.  Cholecystectomy.   3.  Rectum dilation.    4.  Several ventral hernias with a mesh repair over 6 of the 7 hernias in 1996.   5.  Mohs procedure.    6.  Right carpal tunnel release.      SOCIAL HISTORY:   1.  Tobacco, none.   2.  Alcohol rarely.   3.  Illicit drugs:  None.   4.  Lives in a house with her .      FAMILY HISTORY:  Father heart disease.      ALLERGIES:  NO KNOWN DRUG ALLERGIES.  THE PATIENT REPORTS THAT SHE DOES NOT HAVE A CONTRAST DYE ALLERGY, THIS WAS DOCUMENTED IN ERROR OVER 20 YEARS AGO.      MEDICATIONS:    Prior to Admission medications    Medication Sig Last Dose Taking? Auth Provider   mercaptopurine (PURINETHOL) 50 MG tablet Take 100 mg by mouth At Bedtime  1/16/2018 at hs Yes Reported, Patient   cyanocobalamin 1000 MCG/ML injection Inject 1 mL as directed every 30 days. Next dose due in Feb 2018 (couldn't specify date) at - Yes Reported, Patient     REVIEW OF SYSTEMS:  A 10-point review of systems was completed.  All pertinent positives are noted in the HPI.  All other systems negative.      PHYSICAL EXAMINATION:   VITAL SIGNS:  Reviewed and are as follows:  Temperature 97.9, blood pressure 144/81, heart rate 86, respiratory rate 16, O2 sats 98% on room air.   CONSTITUTIONAL:  The patient lying in bed at a 45-degree angle, pleasant, awake, alert, cooperative, in no apparent distress and appears stated age, healthy-appearing and well-groomed.   HEENT:  Pupils equal, round and reactive to light.  Extraocular muscles intact.  Sclerae are clear.  Normocephalic, atraumatic.  Oropharynx with moist mucous membranes.   NECK:  Supple, no adenopathy.  Normal range of motion, no nuchal rigidity noted.   LUNGS:  No increased work of breathing.  Clear to auscultation bilaterally.  No crackles or wheezing noted.   CARDIOVASCULAR:  Normal apical pulse, regular rate and rhythm, normal S1 and S2.  No murmur, rub or gallop noted.   ABDOMEN:  Normal bowel sounds, soft, nondistended.  Mild tenderness noted to palpation in right lower quadrant.  No  masses palpated.  No guarding or rebound tenderness noted.   EXTREMITIES:  Moves all 4 extremities.  Dorsalis pedis and radial pulses palpable bilaterally in her lower extremities with no edema.   NEUROLOGIC:  Awake, alert, oriented.  Cranial nerves II-XII are grossly intact.  Sensory is intact.  Speech fluent.   SKIN:  Warm and dry.  No lesions or rash noted.  No erythema.   PSYCHIATRIC:  Mentation appears normal and affect normal.      LABORATORY DATA:  Reviewed in Epic.      ASSESSMENT AND PLAN:  Ms. Mercedes Arita is a 52-year-old female with past medical history significant for Crohn's disease with chronic pain who presents today with right lower quadrant pain.  The patient to be admitted for further evaluation and management.      Right lower quadrant pain, likely secondary to Crohn's disease and possible enteritis.    -Will continue prior to admission mercaptopurine.      -Consulted [facility], appreciate recommendations.  -The patient is scheduled for a fluoroscopy tomorrow with [facility].     -Clear liquid diet today until midnight, and at that time, we will place the patient n.p.o. and start normal saline at 100 mL per hour.   -IV Dilaudid and Percocet ordered for pain management.   -Place patient on continuous pulse oximetry if utilizing narcotics.   -P.r.n. O2 to maintain O2 sats greater than 92%.   -We will repeat hepatic panel in a.m. as ALT and AST were 82 and 50 on admission.      Deep vein thrombosis prophylaxis.    -Encourage patient to get out of bed daily and ambulate frequently while awake.      CODE STATUS:  Full Code.      DISPOSITION:  We will admit the patient to observational status in the setting of patient requiring pain management prior to scheduled fluoroscopy tomorrow, possible discharge pending fluoroscopy results.      This patient was discussed with Dr. Ulysses Prescott of the Hospitalist Service who agrees with the current plans as outlined above.         ULYSSES PRESCOTT MD        As dictated by ALPESH ALVA NP            D: 2018 22:57   T: 2018 00:15   MT: CHRISTINE      Name:     BEVERLY KNOWLES   MRN:      [id number]        Account:      [id number]   :      1965           Admitted:     [id number]      Document: [id number]       cc: Ginny Rodgers MD

## 2018-01-18 NOTE — PLAN OF CARE
Problem: Patient Care Overview  Goal: Plan of Care/Patient Progress Review  Outcome: No Change  A&Ox4. Up SBA. NPO. C/o abd pain, IV dilaudid x2. Pt went to OR for fluoroscopy around noon today.

## 2018-01-18 NOTE — PROGRESS NOTES
Protonix IV not yet available. Med scheduled for 0695. Pharmacy was paged, and phone called. Awaiting for medication to arrive.

## 2018-01-18 NOTE — PROGRESS NOTES
[facility]    Hospitalist Progress Note    Assessment & Plan   Mercedes Arita is a 52 year old female who was admitted on 1/17/2018 to observation for fluoroscopy with dilation of presumed stricture.    Duodenal stricture: s/p dilation on 1/18 per GI. 2/2 Crohns. Cleared for diet.  - continue the liquid diet  - per GI no discharge today, would monitor  - steroids or other Crohn specific treatment deferred to GI  - PPI         DVT Prophylaxis: Ambulate every shift  Code Status: Full Code    Disposition: Expected discharge in 1-2 days once cleared by GI.    Ulysses Mccord, DO  Text Page  (7am to 6pm)  Interval History   Feeling well post dilation  Really hungry, asking for more than liquid food, but has not tried liquid yet.  No pain    -Data reviewed today: I reviewed all new labs and imaging results over the last 24 hours. I personally reviewed no images or EKG's today.    Physical Exam   Temp: 96.6  F (35.9  C) Temp src: Oral BP: 123/73   Heart Rate: 65 Resp: 12 SpO2: 94 % O2 Device: None (Room air) Oxygen Delivery: 6 LPM  Vitals:    01/17/18 2213 01/17/18 2243 01/18/18 0614   Weight: 73.5 kg (162 lb) 73.1 kg (161 lb 3.2 oz) 73.1 kg (161 lb 3.2 oz)     Vital Signs with Ranges  Temp:  [96.2  F (35.7  C)-98.2  F (36.8  C)] 96.6  F (35.9  C)  Heart Rate:  [64-85] 65  Resp:  [10-25] 12  BP: ()/(60-78) 123/73  SpO2:  [93 %-100 %] 94 %  I/O last 3 completed shifts:  In: 800 [I.V.:800]  Out: 0     Constitutional: Awake, alert, cooperative, no apparent distress  Respiratory: Clear to auscultation bilaterally, no crackles or wheezing  Cardiovascular: Regular rate and rhythm, normal S1 and S2, and no murmur noted  GI: Normal bowel sounds, soft, non-distended, non-tender  Skin/Integumen: No rashes, no cyanosis, no edema  Other:     Medications     lactated ringers       - MEDICATION INSTRUCTIONS -       - MEDICATION INSTRUCTIONS -       - MEDICATION INSTRUCTIONS -       - MEDICATION INSTRUCTIONS -        - MEDICATION INSTRUCTIONS -       pantoprazole (PROTONIX) infusion ADULT/PEDS GREATER than or EQUAL to 45 kg       NaCl 100 mL/hr at 01/18/18 0958       sodium chloride (PF)  3 mL Intracatheter Q8H     sodium chloride (PF)  3 mL Intracatheter Q8H     mercaptopurine  100 mg Oral At Bedtime       Data     Recent Labs  Lab 01/18/18  0719 01/17/18  1826   WBC  --  6.8   HGB  --  13.5   MCV  --  85   PLT  --  221   NA  --  138   POTASSIUM  --  3.6   CHLORIDE  --  104   CO2  --  25   BUN  --  11   CR  --  0.72   ANIONGAP  --  9   TRAVIS  --  9.0   GLC  --  77   ALBUMIN 3.1* 3.7   PROTTOTAL 7.4 8.6   BILITOTAL 0.6 0.5   ALKPHOS 67 86   ALT 70* 82*   AST 55* 50*   LIPASE  --  140       Imaging:   Recent Results (from the past 24 hour(s))   CT Abdomen Pelvis without Contrast (stone protocol)    Narrative    CT ABDOMEN AND PELVIS WITHOUT CONTRAST   1/17/2018 6:43 PM     HISTORY: Abdominal pain.    TECHNIQUE: Noncontrast CT abdomen and pelvis was performed. Radiation  dose for this scan was reduced using automated exposure control,  adjustment of the mA and/or kV according to patient size, or iterative  reconstruction technique.    COMPARISON: CT abdomen and pelvis 10/20/2017.    FINDINGS: No urolithiasis or hydronephrosis. No acute renal  abnormality. Fatty liver. Status post cholecystectomy. Adrenals,  spleen, and pancreas show no acute abnormalities.    Moderate fluid within the colon. Postoperative change of the proximal  colon appears patent. No small bowel obstruction. No free air or free  fluid.      Impression    IMPRESSION:  1. Moderate fluid within the colon could relate to an enteritis. No  other acute abnormality.  2. Fatty liver.    HAYDE TOTH MD

## 2018-01-18 NOTE — ANESTHESIA PREPROCEDURE EVALUATION
Procedure: Procedure(s):  GASTROSCOPY  Preop diagnosis: VOMITTING    Allergies   Allergen Reactions     Contrast Dye Other (See Comments)     Sneezed once. This was ten years ago and pt doesn't recall any other symptoms.     Past Medical History:   Diagnosis Date     Chronic pain     Abdominal pain     Crohn's     Chrons     Crohn's disease (H)     diagnosed 1983     PONV (postoperative nausea and vomiting)      Past Surgical History:   Procedure Laterality Date     CHOLECYSTECTOMY       DILATE RECTUM  9/19/2012    Procedure: DILATE RECTUM;  Exam Under Anesthesia, Dilation with Stricture, lysis of adhesions, Laparoscopic hand assisted Ileocolic Resection ;  Surgeon: Magy Macias MD;  Location:  OR     GI SURGERY  1987,1996,2012    bowel resection times 3 ileo-colonic resections     HERNIA REPAIR      Ventral times 2 with mesh.     LAPAROSCOPIC RESECTION ILEOCECAL  9/19/2012    Procedure: LAPAROSCOPIC RESECTION ILEOCECAL;;  Surgeon: Magy Macias MD;  Location:  OR     LAPAROTOMY EXPLORATORY      For removal of adhesions.     MOHS MICROGRAPHIC PROCEDURE      Removal of skin cancer on face.     ORTHOPEDIC SURGERY      carpal tunnel     Prior to Admission medications    Medication Sig Start Date End Date Taking? Authorizing Provider   mercaptopurine (PURINETHOL) 50 MG tablet Take 100 mg by mouth At Bedtime  1/3/14  Yes Reported, Patient   cyanocobalamin 1000 MCG/ML injection Inject 1 mL as directed every 30 days.   Yes Reported, Patient     Current Facility-Administered Medications Ordered in Epic   Medication Dose Route Frequency Last Rate Last Dose     lidocaine 1 % 1 mL  1 mL Other Q1H PRN         lactated ringers infusion   Intravenous Continuous         lidocaine 1 % 1 mL  1 mL Other Q1H PRN         lidocaine (LMX4) cream   Topical Q1H PRN         sodium chloride (PF) 0.9% PF flush 3 mL  3 mL Intracatheter Q1H PRN         sodium chloride (PF) 0.9% PF flush 3 mL  3 mL Intracatheter Q8H          May continue current IV fluids if patient has IV fluids infusing.   Does not apply Continuous PRN         May take regular AM medications except those listed below   Does not apply Continuous PRN         lidocaine 1 % 1 mL  1 mL Other Q1H PRN         lidocaine (LMX4) cream   Topical Q1H PRN         sodium chloride (PF) 0.9% PF flush 3 mL  3 mL Intracatheter Q1H PRN         sodium chloride (PF) 0.9% PF flush 3 mL  3 mL Intracatheter Q8H         May continue current IV fluids if patient has IV fluids infusing.   Does not apply Continuous PRN         May take regular AM medications except those listed below   Does not apply Continuous PRN         [Auto Hold] mercaptopurine (PURINETHOL) tablet CHEMO 100 mg  100 mg Oral At Bedtime   100 mg at 01/18/18 0325     [Auto Hold] acetaminophen (TYLENOL) tablet 650 mg  650 mg Oral Q4H PRN         [Auto Hold] acetaminophen (TYLENOL) Suppository 650 mg  650 mg Rectal Q4H PRN         [Auto Hold] naloxone (NARCAN) injection 0.1-0.4 mg  0.1-0.4 mg Intravenous Q2 Min PRN         [Auto Hold] ondansetron (ZOFRAN-ODT) ODT tab 4 mg  4 mg Oral Q6H PRN   4 mg at 01/18/18 0002    Or     [Auto Hold] ondansetron (ZOFRAN) injection 4 mg  4 mg Intravenous Q6H PRN         0.9% sodium chloride infusion   Intravenous Continuous 100 mL/hr at 01/18/18 0958       [Auto Hold] oxyCODONE-acetaminophen (PERCOCET) 5-325 MG per tablet 1-2 tablet  1-2 tablet Oral Q4H PRN         [Auto Hold] HYDROmorphone (PF) (DILAUDID) injection 0.3 mg  0.3 mg Intravenous Q2H PRN   0.3 mg at 01/18/18 1139     [Auto Hold] prochlorperazine (COMPAZINE) injection 10 mg  10 mg Intravenous Q6H PRN        Or     [Auto Hold] prochlorperazine (COMPAZINE) tablet 10 mg  10 mg Oral Q6H PRN        Or     [Auto Hold] prochlorperazine (COMPAZINE) Suppository 25 mg  25 mg Rectal Q12H PRN         No current Epic-ordered outpatient prescriptions on file.     Wt Readings from Last 1 Encounters:   01/18/18 73.1 kg (161 lb 3.2 oz)     Temp  Readings from Last 1 Encounters:   01/18/18 36.3  C (97.4  F) (Oral)     BP Readings from Last 6 Encounters:   01/18/18 114/69   10/20/17 121/77   12/31/16 124/89   10/08/13 112/76   08/26/13 113/77   07/24/13 126/76     Pulse Readings from Last 4 Encounters:   10/20/17 92   10/08/13 80   08/26/13 88   07/24/13 60     Resp Readings from Last 1 Encounters:   01/18/18 16     SpO2 Readings from Last 1 Encounters:   01/18/18 94%     Recent Labs   Lab Test  01/17/18   1826  10/20/17   1819   NA  138  135   POTASSIUM  3.6  3.8   CHLORIDE  104  101   CO2  25  26   ANIONGAP  9  8   GLC  77  101*   BUN  11  12   CR  0.72  0.73   TRAVIS  9.0  9.1     Recent Labs   Lab Test  01/17/18   1826  10/20/17   1819   WBC  6.8  7.1   HGB  13.5  10.8*   PLT  221  323     Recent Labs   Lab Test  08/26/13   0955 08/20/13   INR  1.03  1.0        ECG: Sinus rhythm  Nonspecific ST abnormality  Abnormal QRS-T angle, consider primary T wave abnormality  Abnormal ECG  No previous ECGs available        Anesthesia Evaluation     . Pt has had prior anesthetic. Type: General    History of anesthetic complications   - PONV        ROS/MED HX    ENT/Pulmonary:      (-) tobacco use, asthma, COPD and sleep apnea   Neurologic:      (-) seizures, CVA and migraines   Cardiovascular:        (-) hypertension, CAD, ZIMMERMAN, arrhythmias and dyslipidemia   METS/Exercise Tolerance:  >4 METS   Hematologic:        (-) history of blood clots, anemia and other hematologic disorder   Musculoskeletal:        (-) arthritis   GI/Hepatic: Comment: Fatty liver disease, chron's disease    (+) liver disease, Other GI/Hepatic non-specific abdominal pain     (-) GERD   Renal/Genitourinary:      (-) renal disease and nephrolithiasis   Endo:      (-) Type I DM, Type II DM, thyroid disease and obesity   Psychiatric:  - neg psychiatric ROS       Infectious Disease:        (-) Recent Fever   Malignancy:         Other:                     Physical Exam  Normal systems: cardiovascular,  pulmonary and dental    Airway   Mallampati: III  Neck ROM: full    Dental     Cardiovascular   Rhythm and rate: regular and normal  (-) no murmur    Pulmonary    breath sounds clear to auscultation                    Anesthesia Plan      History & Physical Review      ASA Status:  2 .    NPO Status:  > 8 hours    Plan for MAC Reason for MAC:  Deep or markedly invasive procedure (G8)  PONV prophylaxis:  Ondansetron (or other 5HT-3) and Dexamethasone or Solumedrol  Propofol infusion  Fentanyl, versed      Postoperative Care  Postoperative pain management:  Multi-modal analgesia.      Consents  Anesthetic plan, risks, benefits and alternatives discussed with:  Patient..                          .

## 2018-01-18 NOTE — ANESTHESIA POSTPROCEDURE EVALUATION
Patient: Mercedes SWIFT Lyla    Procedure(s):  GASTROSCOPY WITH balloon DILATION of duodenal stricture up to 13.5mm UNDER FLUOROSCOPY (MAC SEDATION)     Diagnosis:VOMITTING  Diagnosis Additional Information: No value filed.    Anesthesia Type:  MAC    Note:  Anesthesia Post Evaluation    Patient location during evaluation: PACU  Patient participation: Able to fully participate in evaluation  Level of consciousness: awake and alert  Pain management: adequate  Airway patency: patent  Cardiovascular status: acceptable  Respiratory status: acceptable  Hydration status: acceptable  PONV: none     Anesthetic complications: None          Last vitals:  Vitals:    01/18/18 0755 01/18/18 1103 01/18/18 1426   BP: 127/74 114/69 106/67   Resp: 16 16 22   Temp: 36.3  C (97.3  F) 36.3  C (97.4  F) 36.8  C (98.2  F)   SpO2: 95% 94% 99%         Electronically Signed By: Jesusita Luis MD  January 18, 2018  2:36 PM

## 2018-01-19 VITALS
OXYGEN SATURATION: 91 % | WEIGHT: 161.2 LBS | HEIGHT: 62 IN | TEMPERATURE: 98.3 F | DIASTOLIC BLOOD PRESSURE: 62 MMHG | BODY MASS INDEX: 29.66 KG/M2 | RESPIRATION RATE: 14 BRPM | SYSTOLIC BLOOD PRESSURE: 128 MMHG

## 2018-01-19 LAB
ANION GAP SERPL CALCULATED.3IONS-SCNC: 8 MMOL/L (ref 3–14)
BUN SERPL-MCNC: 8 MG/DL (ref 7–30)
CALCIUM SERPL-MCNC: 8.6 MG/DL (ref 8.5–10.1)
CHLORIDE SERPL-SCNC: 106 MMOL/L (ref 94–109)
CO2 SERPL-SCNC: 25 MMOL/L (ref 20–32)
CREAT SERPL-MCNC: 0.68 MG/DL (ref 0.52–1.04)
GFR SERPL CREATININE-BSD FRML MDRD: >90 ML/MIN/1.7M2
GLUCOSE SERPL-MCNC: 77 MG/DL (ref 70–99)
POTASSIUM SERPL-SCNC: 4 MMOL/L (ref 3.4–5.3)
SODIUM SERPL-SCNC: 139 MMOL/L (ref 133–144)

## 2018-01-19 PROCEDURE — 99217 ZZC OBSERVATION CARE DISCHARGE: CPT | Performed by: HOSPITALIST

## 2018-01-19 PROCEDURE — 25000128 H RX IP 250 OP 636: Performed by: PHYSICIAN ASSISTANT

## 2018-01-19 PROCEDURE — 25000128 H RX IP 250 OP 636: Performed by: NURSE PRACTITIONER

## 2018-01-19 PROCEDURE — G0378 HOSPITAL OBSERVATION PER HR: HCPCS

## 2018-01-19 PROCEDURE — 25000125 ZZHC RX 250: Performed by: PHYSICIAN ASSISTANT

## 2018-01-19 PROCEDURE — 80048 BASIC METABOLIC PNL TOTAL CA: CPT | Performed by: HOSPITALIST

## 2018-01-19 PROCEDURE — 36415 COLL VENOUS BLD VENIPUNCTURE: CPT | Performed by: HOSPITALIST

## 2018-01-19 RX ORDER — PANTOPRAZOLE SODIUM 40 MG/1
40 TABLET, DELAYED RELEASE ORAL
Qty: 60 TABLET | Refills: 1 | Status: SHIPPED | OUTPATIENT
Start: 2018-01-20 | End: 2022-03-02

## 2018-01-19 RX ORDER — PANTOPRAZOLE SODIUM 40 MG/1
40 TABLET, DELAYED RELEASE ORAL
Status: DISCONTINUED | OUTPATIENT
Start: 2018-01-20 | End: 2018-01-19 | Stop reason: HOSPADM

## 2018-01-19 RX ADMIN — SODIUM CHLORIDE 8 MG/HR: 9 INJECTION, SOLUTION INTRAVENOUS at 03:24

## 2018-01-19 RX ADMIN — SODIUM CHLORIDE: 9 INJECTION, SOLUTION INTRAVENOUS at 02:11

## 2018-01-19 NOTE — PLAN OF CARE
Problem: Patient Care Overview  Goal: Plan of Care/Patient Progress Review  Outcome: Improving  A&Ox4. VSS. Denies nausea. Up SBA. Full liquids diet. Tolerating diet well.  Mild C/o abd pain, prn annaeno x1: effective.Ambulated in room and bathroom multiple times this shift. Tolerating activity well. Continue to monitor.

## 2018-01-19 NOTE — DISCHARGE SUMMARY
[facility]    Discharge Summary  Hospitalist    Date of Admission:  1/17/2018  Date of Discharge:  1/19/2018 11:28 AM  Discharging Provider: Ulysses Mccord DO    Discharge Diagnoses   Duodenal stricture  crohns disease    History of Present Illness   Mercedes Arita is an 52 year old female who presented with abdominal pain and had a duodenal stricture dilated by GI. She had an unremarkable postoeperative course and had no symptoms prior to discharge.    Hospital Course   Mercedes Arita was admitted on 1/17/2018.  The following problems were addressed during her hospitalization:    Active Problems:    Crohn disease (H)       Duodenal stricture: s/p dilation on 1/18 per GI. 2/2 Crohns. Cleared for diet.  - fu with GI  - PPI bid     Crohns disease:  - continue the mercaptopurine        Ulysses Mccord DO    Significant Results and Procedures   none    Pending Results   These results will be followed up by none  Unresulted Labs Ordered in the Past 30 Days of this Admission     No orders found from 11/18/2017 to 1/18/2018.          Code Status   Full Code       Primary Care Physician   Chevy Fonseca    Physical Exam   Temp: 98.3  F (36.8  C) Temp src: Oral BP: 128/62   Heart Rate: 84 Resp: 14 SpO2: 91 % O2 Device: None (Room air) Oxygen Delivery: 6 LPM  Vitals:    01/17/18 2213 01/17/18 2243 01/18/18 0614   Weight: 73.5 kg (162 lb) 73.1 kg (161 lb 3.2 oz) 73.1 kg (161 lb 3.2 oz)     Vital Signs with Ranges  Temp:  [96.5  F (35.8  C)-98.3  F (36.8  C)] 98.3  F (36.8  C)  Heart Rate:  [64-87] 84  Resp:  [10-25] 14  BP: ()/(60-75) 128/62  SpO2:  [91 %-100 %] 91 %  I/O last 3 completed shifts:  In: 2180 [P.O.:480; I.V.:1700]  Out: 1 [Urine:1]    Constitutional: Awake, alert, cooperative, no apparent distress.  Eyes: Conjunctiva and pupils examined and normal.  HEENT: Moist mucous membranes, normal dentition.  Respiratory: Clear to auscultation bilaterally, no crackles or  wheezing.  Cardiovascular: Regular rate and rhythm, normal S1 and S2, and no murmur noted.  GI: Soft, non-distended, non-tender, normal bowel sounds.  Psychiatric: Alert, oriented to person, place and time, no obvious anxiety or depression.    Discharge Disposition   Discharged to home  Condition at discharge: Stable    Consultations This Hospital Stay   GASTROENTEROLOGY IP CONSULT    Time Spent on this Encounter   I, Ulysses Mccord, personally saw the patient today and spent less than or equal to 30 minutes discharging this patient.    Discharge Orders     Reason for your hospital stay   Duodenal stricture that was dilated     Follow-up and recommended labs and tests    Follow up with primary care provider, Chevy Fonseca, within 7 days to evaluate treatment change.  No follow up labs or test are needed.      Follow up with GI as outpatient as directed     Activity   Your activity upon discharge: activity as tolerated     Full Code     Diet   Follow this diet upon discharge: Orders Placed This Encounter     Regular Diet Adult       Discharge Medications   Discharge Medication List as of 1/19/2018 11:02 AM      START taking these medications    Details   pantoprazole (PROTONIX) 40 MG EC tablet Take 1 tablet (40 mg) by mouth 2 times daily (before meals), Disp-60 tablet, R-1, E-Prescribe         CONTINUE these medications which have NOT CHANGED    Details   mercaptopurine (PURINETHOL) 50 MG tablet Take 100 mg by mouth At Bedtime , Historical      cyanocobalamin 1000 MCG/ML injection Inject 1 mL as directed every 30 days., Historical           Allergies   Allergies   Allergen Reactions     Contrast Dye Other (See Comments)     Sneezed once. This was ten years ago and pt doesn't recall any other symptoms.     Data   Most Recent 3 CBC's:  Recent Labs   Lab Test  01/17/18   1826  10/20/17   1819  09/22/12   0611  09/21/12   0715  09/20/12   0700   WBC  6.8  7.1   --    --   14.5*   HGB  13.5  10.8*   --   8.8*   9.6*   MCV  85  91   --    --   87   PLT  221  323  213   --   230      Most Recent 3 BMP's:  Recent Labs   Lab Test  01/19/18   0705  01/17/18   1826  10/20/17   1819   NA  139  138  135   POTASSIUM  4.0  3.6  3.8   CHLORIDE  106  104  101   CO2  25  25  26   BUN  8  11  12   CR  0.68  0.72  0.73   ANIONGAP  8  9  8   TRAVIS  8.6  9.0  9.1   GLC  77  77  101*     Most Recent 2 LFT's:  Recent Labs   Lab Test  01/18/18   0719  01/17/18   1826   AST  55*  50*   ALT  70*  82*   ALKPHOS  67  86   BILITOTAL  0.6  0.5     Most Recent INR's and Anticoagulation Dosing History:  Anticoagulation Dose History     Recent Dosing and Labs Latest Ref Rng & Units 8/20/2013 8/26/2013    INR 0.86 - 1.14 1.0 1.03        Most Recent 3 Troponin's:No lab results found.  Most Recent Cholesterol Panel:  Recent Labs   Lab Test  09/14/12   1616   CHOL  162   LDL  64.6   HDL  64*   TRIG  167*     Most Recent 6 Bacteria Isolates From Any Culture (See EPIC Reports for Culture Details):No lab results found.  Most Recent TSH, T4 and A1c Labs:No lab results found.  Results for orders placed or performed during the hospital encounter of 01/17/18   CT Abdomen Pelvis without Contrast (stone protocol)    Narrative    CT ABDOMEN AND PELVIS WITHOUT CONTRAST   1/17/2018 6:43 PM     HISTORY: Abdominal pain.    TECHNIQUE: Noncontrast CT abdomen and pelvis was performed. Radiation  dose for this scan was reduced using automated exposure control,  adjustment of the mA and/or kV according to patient size, or iterative  reconstruction technique.    COMPARISON: CT abdomen and pelvis 10/20/2017.    FINDINGS: No urolithiasis or hydronephrosis. No acute renal  abnormality. Fatty liver. Status post cholecystectomy. Adrenals,  spleen, and pancreas show no acute abnormalities.    Moderate fluid within the colon. Postoperative change of the proximal  colon appears patent. No small bowel obstruction. No free air or free  fluid.      Impression    IMPRESSION:  1. Moderate  fluid within the colon could relate to an enteritis. No  other acute abnormality.  2. Fatty liver.    HAYDE TOTH MD   XR Fluoro Time 0/1 Hour    Narrative    XR FLUORO TIME 0/1 HOUR  1/18/2018 2:15 PM     HISTORY:  duodenal stricture;     COMPARISON: None.    FLUOROSCOPY TIME: 1.4 minutes.   SPOT IMAGES OR CINE RUNS: 1    FINDINGS: Fluoroscopy was utilized during upper endoscopy. Please  refer to the operative note for further specifics.    GABRIELLE GORE MD

## 2018-01-19 NOTE — PLAN OF CARE
Problem: Patient Care Overview  Goal: Plan of Care/Patient Progress Review  Outcome: No Change  A&Ox4. VSS. Observation pt. Had fluoroscopy 01/18, tolerating full liquid diet. Denied any pain or nausea this shift. Up SBA. PIV infusing NS @100 and Protonix @10ml/hr. Chemo precautions- taking chemo medication for crohns treatment. Will continue to monitor.

## 2018-01-19 NOTE — PROGRESS NOTES
"[facility]  [facility]  Gastroenterology Progress note    Interval History:      Patient feels much better and has no complaints.        Vital Signs:      /62 (BP Location: Left arm)  Temp 98.3  F (36.8  C) (Oral)  Resp 14  Ht 1.585 m (5' 2.4\")  Wt 73.1 kg (161 lb 3.2 oz)  SpO2 91%  Breastfeeding? No  BMI 29.11 kg/m2  Temp (24hrs), Av.4  F (36.3  C), Min:96.5  F (35.8  C), Max:98.3  F (36.8  C)    Patient Vitals for the past 72 hrs:   Weight   18 0614 73.1 kg (161 lb 3.2 oz)   18 2243 73.1 kg (161 lb 3.2 oz)   18 2213 73.5 kg (162 lb)   18 1613 71.7 kg (158 lb)       Intake/Output Summary (Last 24 hours) at 18 0848  Last data filed at 18 2300   Gross per 24 hour   Intake             2180 ml   Output                1 ml   Net             2179 ml         Constitutional: NAD, comfortable    Additional Comments:  ROS, FH, SH: See initial GI consult for details.    Laboratory Data:  Recent Labs   Lab Test  01/17/18   1826  10/20/17   1819  13   0955 13   0611  12   0715  12   0700   WBC  6.8  7.1   --    --    --    --   14.5*   HGB  13.5  10.8*   --    --    --   8.8*  9.6*   MCV  85  91   --    --    --    --   87   PLT  221  323   --    --   213   --   230   INR   --    --   1.03  1.0   --    --    --      Recent Labs   Lab Test  18   0705  01/17/18   1826  10/20/17   181   NA  139  138  135   POTASSIUM  4.0  3.6  3.8   CHLORIDE  106  104  101   CO2  25  25  26   BUN  8  11  12   CR  0.68  0.72  0.73   ANIONGAP  8  9  8   TRAVIS  8.6  9.0  9.1     Recent Labs   Lab Test  18   0719  18   1826  10/20/17   1819  02/03/16 10/09/15   08/21/12   1620  12   1600   ALBUMIN  3.1*  3.7  3.6   --   3.6  3.3   < >  4.6   --    BILITOTAL  0.6  0.5  0.6   --   0.2  0.5   < >  0.8   --    DBIL  <0.1   --    --    --   0.1  0.1   < >   --    --    ALT  70*  82*  101*   < >  69  108   < > "  29   --    AST  55*  50*  51*   < >  37  41   < >  43   --    ALKPHOS  67  86  82   --   92  89   < >  91   --    PROTEIN   --    --    --    --    --    --    --    --   Negative   LIPASE   --   140  225   --    --    --    --   132   --     < > = values in this interval not displayed.         Assessment:    1.  RUQ pain  2.  Duodenal stricture  Plan:    -OK to d/c to home from a GI standpoint  -Stop PPI drip  -Stop IVF  -PPI BID x 8 weeks  -Follow up with Dr. Marian Osborne, [facility]  Office:  [phone] call if needed after 5PM  Cell:  [phone], not available after 5PM at this number

## 2018-01-26 LAB — UPPER GI ENDOSCOPY: NORMAL

## 2018-02-08 ENCOUNTER — TRANSFERRED RECORDS (OUTPATIENT)
Dept: HEALTH INFORMATION MANAGEMENT | Facility: CLINIC | Age: 53
End: 2018-02-08

## 2018-06-12 ENCOUNTER — TRANSFERRED RECORDS (OUTPATIENT)
Dept: HEALTH INFORMATION MANAGEMENT | Facility: CLINIC | Age: 53
End: 2018-06-12

## 2018-10-10 ENCOUNTER — TRANSFERRED RECORDS (OUTPATIENT)
Dept: HEALTH INFORMATION MANAGEMENT | Facility: CLINIC | Age: 53
End: 2018-10-10

## 2020-01-06 ENCOUNTER — TRANSFERRED RECORDS (OUTPATIENT)
Dept: HEALTH INFORMATION MANAGEMENT | Facility: CLINIC | Age: 55
End: 2020-01-06

## 2020-01-06 LAB
ALT SERPL-CCNC: 54 U/L (ref 0–78)
AST SERPL-CCNC: 37 U/L (ref 0–37)

## 2020-04-22 ENCOUNTER — TRANSFERRED RECORDS (OUTPATIENT)
Dept: HEALTH INFORMATION MANAGEMENT | Facility: CLINIC | Age: 55
End: 2020-04-22

## 2020-04-22 LAB
ALT SERPL-CCNC: 55 U/L (ref 0–78)
AST SERPL-CCNC: 48 U/L (ref 0–37)

## 2021-01-11 ENCOUNTER — TRANSFERRED RECORDS (OUTPATIENT)
Dept: HEALTH INFORMATION MANAGEMENT | Facility: CLINIC | Age: 56
End: 2021-01-11

## 2021-01-11 LAB
ALT SERPL-CCNC: 46 U/L (ref 0–78)
AST SERPL-CCNC: 50 U/L (ref 0–37)

## 2021-09-20 ENCOUNTER — TRANSFERRED RECORDS (OUTPATIENT)
Dept: HEALTH INFORMATION MANAGEMENT | Facility: CLINIC | Age: 56
End: 2021-09-20

## 2021-09-20 LAB
ALT SERPL-CCNC: 30 LU/L (ref 0–32)
AST SERPL-CCNC: 39 LU/L (ref 0–40)

## 2021-10-05 ENCOUNTER — TRANSFERRED RECORDS (OUTPATIENT)
Dept: HEALTH INFORMATION MANAGEMENT | Facility: CLINIC | Age: 56
End: 2021-10-05

## 2021-10-13 ENCOUNTER — DOCUMENTATION ONLY (OUTPATIENT)
Dept: ONCOLOGY | Facility: CLINIC | Age: 56
End: 2021-10-13

## 2021-10-13 ENCOUNTER — TRANSCRIBE ORDERS (OUTPATIENT)
Dept: OTHER | Age: 56
End: 2021-10-13

## 2021-10-13 DIAGNOSIS — R71.8 RBC ABNORMALITY: Primary | ICD-10-CM

## 2021-10-13 NOTE — PROGRESS NOTES
Writer received referral for abnormal labs. Reviewed for urgency based on labs and symptomology. Appropriate scheduling instructions added and referral sent to New Patient Scheduling for completion.

## 2021-10-23 NOTE — PROGRESS NOTES
RECORDS STATUS - ALL OTHER DIAGNOSIS      RECORDS RECEIVED FROM: [facility]    DATE RECEIVED: 11/12/2021   NOTES STATUS DETAILS   OFFICE NOTE from referring provider Complete Dr Chevy Fonseca at [facility] ([phone]) is referring to Heme/Onc for dx: RBC abnormality    OFFICE NOTE from medical oncologist [facility] Labs are in [facility]   DISCHARGE SUMMARY from hospital     DISCHARGE REPORT from the ER     OPERATIVE REPORT     MEDICATION LIST Complete [facility]   CLINICAL TRIAL TREATMENTS TO DATE     LABS     PATHOLOGY REPORTS     ANYTHING RELATED TO DIAGNOSIS Complete Labs last updated on 9/20/2021 [facility] labs are in [facility]   GENONOMIC TESTING     TYPE:     IMAGING (NEED IMAGES & REPORT)     CT SCANS     MRI     MAMMO     ULTRASOUND     PET

## 2021-11-11 NOTE — PROGRESS NOTES
[facility]    Hematology/Oncology New Patient Note      Today's Date: 11/11/21    Reason for Consultation: RBC abnormality  Referring Provider: Dr. Chevy Fonseca      HISTORY OF PRESENT ILLNESS: Mercedes Arita is a 56 year old female who is referred to clinic for RBC abnormality and thrombocytopenia. Past medical history is significant for Crohn's disease on 6-MPH and PRINGLE.     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 She had her first small bowel resection in 1987. She has had a total of three small bowel resections in her lifetime with the most recent in 2012. She is followed by GI and is evaluated once yearly at this point. She has been on and off 6-MP therapy for the past 35 years. She has not required steroid pulses or hospitalization in decades. She was attempted once on remicade, but had severe non-anaphylactic reaction to this and was never trialed again on biologics. 6-MP has been held since September due to CBC.     Recently, she had an MRE performed due to persistent liver enzymes, which showed advancement in fibrosis. Her most recent colonoscopy was only a year ago without abnormality. No blood in stools, fatigue, unintentional weight loss. No personal or family history of malignancy.    In January 2021, WBC 4.4, Hb 12.6, , , AST 90, ALT 46, T. Bili 1.0, D. Bili 0.2, total protein 7.8, serum albumin 3.5.     In April 2021, WBC 4.1, Hb 12.2, , , AST 42, ALT 48, total protein 7.4, serum albumin 3.4, total bili 0.6, d. Bili 0.3.     In June 2021, WBC 4.6, Hb 12.2, , , AST 38, ALT 41, total protein 7.5, serum albumin 3.4, total bili 1.0, d. Bili 0.3.    In September 2021, WBC 4.6, Hb 11.9, , , AST 39, ALT 30, T. Bili 1.2, D. Bili 0.38.    On 10/5/21: WBC 5.4, Hb 12, , , normal diff.       REVIEW OF SYSTEMS:   A 14 point ROS was reviewed with pertinent positives and negatives in the HPI.        HOME  MEDICATIONS:  Current Outpatient Medications   Medication Sig Dispense Refill     cyanocobalamin 1000 MCG/ML injection Inject 1 mL as directed every 30 days.       mercaptopurine (PURINETHOL) 50 MG tablet Take 100 mg by mouth At Bedtime        pantoprazole (PROTONIX) 40 MG EC tablet Take 1 tablet (40 mg) by mouth 2 times daily (before meals) 60 tablet 1         ALLERGIES:  Allergies   Allergen Reactions     Contrast Dye Other (See Comments)     Sneezed once. This was ten years ago and pt doesn't recall any other symptoms.         PAST MEDICAL HISTORY:  Past Medical History:   Diagnosis Date     Chronic pain     Abdominal pain     Crohn's     Chrons     Crohn's disease (H)     diagnosed 1983     PONV (postoperative nausea and vomiting)          PAST SURGICAL HISTORY:  Past Surgical History:   Procedure Laterality Date     CHOLECYSTECTOMY       DILATE RECTUM  9/19/2012    Procedure: DILATE RECTUM;  Exam Under Anesthesia, Dilation with Stricture, lysis of adhesions, Laparoscopic hand assisted Ileocolic Resection ;  Surgeon: Magy Macias MD;  Location:  OR     GASTROSCOPY N/A 1/18/2018    Procedure: GASTROSCOPY;  GASTROSCOPY WITH balloon DILATION of duodenal stricture up to 13.5mm UNDER FLUOROSCOPY (MAC SEDATION) ;  Surgeon: Aaliyah Langley MD;  Location:  OR     GI SURGERY  1987,1996,2012    bowel resection times 3 ileo-colonic resections     HERNIA REPAIR      Ventral times 2 with mesh.     LAPAROSCOPIC RESECTION ILEOCECAL  9/19/2012    Procedure: LAPAROSCOPIC RESECTION ILEOCECAL;;  Surgeon: Magy Macias MD;  Location:  OR     LAPAROTOMY EXPLORATORY      For removal of adhesions.     MOHS MICROGRAPHIC PROCEDURE      Removal of skin cancer on face.     ORTHOPEDIC SURGERY      carpal tunnel         SOCIAL HISTORY:  Social History     Socioeconomic History     Marital status: Single     Spouse name: Not on file     Number of children: Not on file     Years of education: Not on file      "Highest education level: Not on file   Occupational History     Not on file   Tobacco Use     Smoking status: Never Smoker     Smokeless tobacco: Never Used   Substance and Sexual Activity     Alcohol use: Yes     Comment: 2 times yearly.     Drug use: No     Sexual activity: Yes     Partners: Male   Other Topics Concern     Parent/sibling w/ CABG, MI or angioplasty before 65F 55M? Not Asked   Social History Narrative     Not on file     Social Determinants of Health     Financial Resource Strain: Not on file   Food Insecurity: Not on file   Transportation Needs: Not on file   Physical Activity: Not on file   Stress: Not on file   Social Connections: Not on file   Intimate Partner Violence: Not on file   Housing Stability: Not on file         FAMILY HISTORY:  No family history of malignancy.    PHYSICAL EXAM:  Vital signs:  /74   Pulse 106   Temp 99  F (37.2  C) (Tympanic)   Resp 16   Ht 1.575 m (5' 2\")   Wt 71.7 kg (158 lb)   SpO2 98%   BMI 28.90 kg/m     ECO  GENERAL/CONSTITUTIONAL: No acute distress.  EYES: Pupils are equal, round, and react to light and accommodation. Extraocular movements intact.  No scleral icterus.  ENT/MOUTH: Neck supple. Oropharynx clear, no mucositis.  LYMPH: No anterior cervical, posterior cervical, supraclavicular, axillary or inguinal adenopathy.   RESPIRATORY: Clear to auscultation bilaterally. No crackles or wheezing.   CARDIOVASCULAR: Regular rate and rhythm without murmurs, gallops, or rubs.  GASTROINTESTINAL: Large, well-healed central incisional scar. No hepatosplenomegaly, masses, or tenderness. The patient has normal bowel sounds. No guarding.  No distention.  MUSCULOSKELETAL: Warm and well-perfused, no cyanosis, clubbing, or edema.  NEUROLOGIC: Cranial nerves II-XII are intact. Alert, oriented, answers questions appropriately.  INTEGUMENTARY: No rashes or jaundice.  GAIT: Steady, does not use assistive device      LABS:  CBC RESULTS:   Recent Labs   Lab Test " 01/17/18  1826   WBC 6.8   RBC 4.73   HGB 13.5   HCT 40.0   MCV 85   MCH 28.5   MCHC 33.8   RDW 18.4*          Recent Labs   Lab Test 01/19/18  0705 01/17/18  1826    138   POTASSIUM 4.0 3.6   CHLORIDE 106 104   CO2 25 25   ANIONGAP 8 9   GLC 77 77   BUN 8 11   CR 0.68 0.72   TRAVIS 8.6 9.0         ASSESSMENT/PLAN:  Mercedes Arita is a 56 year old female who is referred to clinic for RBC abnormality and thrombocytopenia. Past medical history is significant for Crohn's disease on 6-MPH and PRINGLE.     Patient has had significant small bowel resection throughout her lifetime due to Crohn's disease and likely has underlying marrow suppression due to poor absorption, PRINGLE, and long-term 6-MP therapy. Patient continues on monthly B12 self-injections as prophylaxis.    -Check CBC, CMP, peripheral smear to assess for any evidence of dysplasia or pseudothrombocytopenia.  -Check LDH, coag studies, and haptoglobin.  -Check B12, folate, and copper levels given history of multiple small bowel resections and likely malabsorption.  -Check HIV and hepatitis studies (patient reports last viral studies over a decade).    We have discussed the possibility of a bone marrow biopsy to rule out an underlying marrow disorder, but will hold at this time.     Follow up in 2 weeks in clinic to review labs above and to discuss need for a bone marrow biopsy.      Thank you for referring Mrs. Arita to clinic today. Total time spent on day of visit, including review of tests, obtaining/reviewing separately obtained history, ordering medications/tests/procedures, communicating with PCP/consultants, and documenting in electronic medical record: 60 minutes. Adequate time was dedicated to patient questions and answered to the expressed satisfaction of the patient.     Destiny Powell,   Hematology/Oncology  [facility]

## 2021-11-12 ENCOUNTER — LAB (OUTPATIENT)
Dept: ONCOLOGY | Facility: CLINIC | Age: 56
End: 2021-11-12
Attending: INTERNAL MEDICINE
Payer: COMMERCIAL

## 2021-11-12 ENCOUNTER — PRE VISIT (OUTPATIENT)
Dept: ONCOLOGY | Facility: CLINIC | Age: 56
End: 2021-11-12

## 2021-11-12 VITALS
DIASTOLIC BLOOD PRESSURE: 74 MMHG | BODY MASS INDEX: 29.08 KG/M2 | HEIGHT: 62 IN | HEART RATE: 106 BPM | SYSTOLIC BLOOD PRESSURE: 128 MMHG | RESPIRATION RATE: 16 BRPM | WEIGHT: 158 LBS | OXYGEN SATURATION: 98 % | TEMPERATURE: 99 F

## 2021-11-12 DIAGNOSIS — D69.6 THROMBOCYTOPENIA (H): Primary | ICD-10-CM

## 2021-11-12 DIAGNOSIS — D69.6 THROMBOCYTOPENIA (H): ICD-10-CM

## 2021-11-12 LAB
ALBUMIN SERPL-MCNC: 3.2 G/DL (ref 3.4–5)
ALP SERPL-CCNC: 134 U/L (ref 40–150)
ALT SERPL W P-5'-P-CCNC: 45 U/L (ref 0–50)
ANION GAP SERPL CALCULATED.3IONS-SCNC: 7 MMOL/L (ref 3–14)
APTT PPP: 36 SECONDS (ref 22–38)
AST SERPL W P-5'-P-CCNC: 46 U/L (ref 0–45)
BASOPHILS # BLD AUTO: 0 10E3/UL (ref 0–0.2)
BASOPHILS # BLD AUTO: 0.1 10E3/UL (ref 0–0.2)
BASOPHILS NFR BLD AUTO: 1 %
BASOPHILS NFR BLD AUTO: 1 %
BILIRUB SERPL-MCNC: 1.3 MG/DL (ref 0.2–1.3)
BUN SERPL-MCNC: 10 MG/DL (ref 7–30)
CALCIUM SERPL-MCNC: 9.3 MG/DL (ref 8.5–10.1)
CHLORIDE BLD-SCNC: 112 MMOL/L (ref 94–109)
CO2 SERPL-SCNC: 24 MMOL/L (ref 20–32)
CREAT SERPL-MCNC: 0.61 MG/DL (ref 0.52–1.04)
EOSINOPHIL # BLD AUTO: 0.2 10E3/UL (ref 0–0.7)
EOSINOPHIL # BLD AUTO: 0.2 10E3/UL (ref 0–0.7)
EOSINOPHIL NFR BLD AUTO: 4 %
EOSINOPHIL NFR BLD AUTO: 4 %
ERYTHROCYTE [DISTWIDTH] IN BLOOD BY AUTOMATED COUNT: 12.6 % (ref 10–15)
ERYTHROCYTE [DISTWIDTH] IN BLOOD BY AUTOMATED COUNT: 12.7 % (ref 10–15)
FIBRINOGEN PPP-MCNC: 300 MG/DL (ref 170–490)
FOLATE SERPL-MCNC: 35.1 NG/ML
GFR SERPL CREATININE-BSD FRML MDRD: >90 ML/MIN/1.73M2
GLUCOSE BLD-MCNC: 117 MG/DL (ref 70–99)
HAV IGG SER QL IA: NONREACTIVE
HBV CORE AB SERPL QL IA: NONREACTIVE
HCT VFR BLD AUTO: 40.7 % (ref 35–47)
HCT VFR BLD AUTO: 41.5 % (ref 35–47)
HCV AB SERPL QL IA: NONREACTIVE
HGB BLD-MCNC: 13.1 G/DL (ref 11.7–15.7)
HGB BLD-MCNC: 13.4 G/DL (ref 11.7–15.7)
HIV 1+2 AB+HIV1 P24 AG SERPL QL IA: NONREACTIVE
IMM GRANULOCYTES # BLD: 0 10E3/UL
IMM GRANULOCYTES # BLD: 0 10E3/UL
IMM GRANULOCYTES NFR BLD: 0 %
IMM GRANULOCYTES NFR BLD: 0 %
INR PPP: 1.3 (ref 0.85–1.15)
LDH SERPL L TO P-CCNC: 277 U/L (ref 81–234)
LYMPHOCYTES # BLD AUTO: 1.1 10E3/UL (ref 0.8–5.3)
LYMPHOCYTES # BLD AUTO: 1.1 10E3/UL (ref 0.8–5.3)
LYMPHOCYTES NFR BLD AUTO: 21 %
LYMPHOCYTES NFR BLD AUTO: 21 %
MCH RBC QN AUTO: 33.3 PG (ref 26.5–33)
MCH RBC QN AUTO: 33.5 PG (ref 26.5–33)
MCHC RBC AUTO-ENTMCNC: 32.2 G/DL (ref 31.5–36.5)
MCHC RBC AUTO-ENTMCNC: 32.3 G/DL (ref 31.5–36.5)
MCV RBC AUTO: 104 FL (ref 78–100)
MCV RBC AUTO: 104 FL (ref 78–100)
MONOCYTES # BLD AUTO: 0.4 10E3/UL (ref 0–1.3)
MONOCYTES # BLD AUTO: 0.4 10E3/UL (ref 0–1.3)
MONOCYTES NFR BLD AUTO: 8 %
MONOCYTES NFR BLD AUTO: 8 %
NEUTROPHILS # BLD AUTO: 3.4 10E3/UL (ref 1.6–8.3)
NEUTROPHILS # BLD AUTO: 3.4 10E3/UL (ref 1.6–8.3)
NEUTROPHILS NFR BLD AUTO: 66 %
NEUTROPHILS NFR BLD AUTO: 66 %
NRBC # BLD AUTO: 0 10E3/UL
NRBC # BLD AUTO: 0 10E3/UL
NRBC BLD AUTO-RTO: 0 /100
NRBC BLD AUTO-RTO: 0 /100
PLATELET # BLD AUTO: 119 10E3/UL (ref 150–450)
PLATELET # BLD AUTO: 122 10E3/UL (ref 150–450)
POTASSIUM BLD-SCNC: 3.6 MMOL/L (ref 3.4–5.3)
PROT SERPL-MCNC: 8.7 G/DL (ref 6.8–8.8)
RBC # BLD AUTO: 3.93 10E6/UL (ref 3.8–5.2)
RBC # BLD AUTO: 4 10E6/UL (ref 3.8–5.2)
RETICS # AUTO: 0.13 10E6/UL (ref 0.03–0.1)
RETICS/RBC NFR AUTO: 3.2 % (ref 0.5–2)
SODIUM SERPL-SCNC: 143 MMOL/L (ref 133–144)
VIT B12 SERPL-MCNC: 655 PG/ML (ref 193–986)
WBC # BLD AUTO: 5.1 10E3/UL (ref 4–11)
WBC # BLD AUTO: 5.2 10E3/UL (ref 4–11)

## 2021-11-12 PROCEDURE — 82525 ASSAY OF COPPER: CPT | Performed by: INTERNAL MEDICINE

## 2021-11-12 PROCEDURE — 83010 ASSAY OF HAPTOGLOBIN QUANT: CPT | Performed by: INTERNAL MEDICINE

## 2021-11-12 PROCEDURE — 86704 HEP B CORE ANTIBODY TOTAL: CPT | Performed by: INTERNAL MEDICINE

## 2021-11-12 PROCEDURE — 82607 VITAMIN B-12: CPT | Performed by: INTERNAL MEDICINE

## 2021-11-12 PROCEDURE — 36415 COLL VENOUS BLD VENIPUNCTURE: CPT | Performed by: INTERNAL MEDICINE

## 2021-11-12 PROCEDURE — 85384 FIBRINOGEN ACTIVITY: CPT | Performed by: INTERNAL MEDICINE

## 2021-11-12 PROCEDURE — 85045 AUTOMATED RETICULOCYTE COUNT: CPT | Performed by: INTERNAL MEDICINE

## 2021-11-12 PROCEDURE — 85730 THROMBOPLASTIN TIME PARTIAL: CPT | Performed by: INTERNAL MEDICINE

## 2021-11-12 PROCEDURE — 82746 ASSAY OF FOLIC ACID SERUM: CPT | Performed by: INTERNAL MEDICINE

## 2021-11-12 PROCEDURE — 87340 HEPATITIS B SURFACE AG IA: CPT | Performed by: INTERNAL MEDICINE

## 2021-11-12 PROCEDURE — 87389 HIV-1 AG W/HIV-1&-2 AB AG IA: CPT | Performed by: INTERNAL MEDICINE

## 2021-11-12 PROCEDURE — 85025 COMPLETE CBC W/AUTO DIFF WBC: CPT | Performed by: INTERNAL MEDICINE

## 2021-11-12 PROCEDURE — 80053 COMPREHEN METABOLIC PANEL: CPT | Performed by: INTERNAL MEDICINE

## 2021-11-12 PROCEDURE — 86803 HEPATITIS C AB TEST: CPT | Performed by: INTERNAL MEDICINE

## 2021-11-12 PROCEDURE — 85610 PROTHROMBIN TIME: CPT | Performed by: INTERNAL MEDICINE

## 2021-11-12 PROCEDURE — 99205 OFFICE O/P NEW HI 60 MIN: CPT | Performed by: INTERNAL MEDICINE

## 2021-11-12 PROCEDURE — 83615 LACTATE (LD) (LDH) ENZYME: CPT | Performed by: INTERNAL MEDICINE

## 2021-11-12 PROCEDURE — 86708 HEPATITIS A ANTIBODY: CPT | Performed by: INTERNAL MEDICINE

## 2021-11-12 ASSESSMENT — PAIN SCALES - GENERAL: PAINLEVEL: NO PAIN (0)

## 2021-11-12 ASSESSMENT — MIFFLIN-ST. JEOR: SCORE: 1264.93

## 2021-11-12 NOTE — LETTER
11/12/2021         RE: Mercedes Arita  [address] [facility] [id number]        Dear Colleague,    Thank you for referring your patient, Mercedes Arita, to the [facility]. Please see a copy of my visit note below.    [facility]    Hematology/Oncology New Patient Note      Today's Date: 11/11/21    Reason for Consultation: RBC abnormality  Referring Provider: Dr. Chevy Fonseca      HISTORY OF PRESENT ILLNESS: Mercedes Arita is a 56 year old female who is referred to clinic for RBC abnormality and thrombocytopenia. Past medical history is significant for Crohn's disease on 6-MPH and PRINGLE.     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 She had her first small bowel resection in 1987. She has had a total of three small bowel resections in her lifetime with the most recent in 2012. She is followed by GI and is evaluated once yearly at this point. She has been on and off 6-MP therapy for the past 35 years. She has not required steroid pulses or hospitalization in decades. She was attempted once on remicade, but had severe non-anaphylactic reaction to this and was never trialed again on biologics. 6-MP has been held since September due to CBC.     Recently, she had an MRE performed due to persistent liver enzymes, which showed advancement in fibrosis. Her most recent colonoscopy was only a year ago without abnormality. No blood in stools, fatigue, unintentional weight loss. No personal or family history of malignancy.    In January 2021, WBC 4.4, Hb 12.6, , , AST 90, ALT 46, T. Bili 1.0, D. Bili 0.2, total protein 7.8, serum albumin 3.5.     In April 2021, WBC 4.1, Hb 12.2, , , AST 42, ALT 48, total protein 7.4, serum albumin 3.4, total bili 0.6, d. Bili 0.3.     In June 2021, WBC 4.6, Hb 12.2, , , AST 38, ALT 41, total protein 7.5, serum albumin 3.4, total bili 1.0, d. Bili 0.3.    In September 2021, WBC  4.6, Hb 11.9, , , AST 39, ALT 30, T. Bili 1.2, D. Bili 0.38.    On 10/5/21: WBC 5.4, Hb 12, , , normal diff.       REVIEW OF SYSTEMS:   A 14 point ROS was reviewed with pertinent positives and negatives in the HPI.        HOME MEDICATIONS:  Current Outpatient Medications   Medication Sig Dispense Refill     cyanocobalamin 1000 MCG/ML injection Inject 1 mL as directed every 30 days.       mercaptopurine (PURINETHOL) 50 MG tablet Take 100 mg by mouth At Bedtime        pantoprazole (PROTONIX) 40 MG EC tablet Take 1 tablet (40 mg) by mouth 2 times daily (before meals) 60 tablet 1         ALLERGIES:  Allergies   Allergen Reactions     Contrast Dye Other (See Comments)     Sneezed once. This was ten years ago and pt doesn't recall any other symptoms.         PAST MEDICAL HISTORY:  Past Medical History:   Diagnosis Date     Chronic pain     Abdominal pain     Crohn's     Chrons     Crohn's disease (H)     diagnosed 1983     PONV (postoperative nausea and vomiting)          PAST SURGICAL HISTORY:  Past Surgical History:   Procedure Laterality Date     CHOLECYSTECTOMY       DILATE RECTUM  9/19/2012    Procedure: DILATE RECTUM;  Exam Under Anesthesia, Dilation with Stricture, lysis of adhesions, Laparoscopic hand assisted Ileocolic Resection ;  Surgeon: Magy Macias MD;  Location:  OR     GASTROSCOPY N/A 1/18/2018    Procedure: GASTROSCOPY;  GASTROSCOPY WITH balloon DILATION of duodenal stricture up to 13.5mm UNDER FLUOROSCOPY (MAC SEDATION) ;  Surgeon: Aaliyah Langley MD;  Location:  OR     GI SURGERY  1987,1996,2012    bowel resection times 3 ileo-colonic resections     HERNIA REPAIR      Ventral times 2 with mesh.     LAPAROSCOPIC RESECTION ILEOCECAL  9/19/2012    Procedure: LAPAROSCOPIC RESECTION ILEOCECAL;;  Surgeon: Magy Macias MD;  Location:  OR     LAPAROTOMY EXPLORATORY      For removal of adhesions.     MOHS MICROGRAPHIC PROCEDURE      Removal of skin  "cancer on face.     ORTHOPEDIC SURGERY      carpal tunnel         SOCIAL HISTORY:  Social History     Socioeconomic History     Marital status: Single     Spouse name: Not on file     Number of children: Not on file     Years of education: Not on file     Highest education level: Not on file   Occupational History     Not on file   Tobacco Use     Smoking status: Never Smoker     Smokeless tobacco: Never Used   Substance and Sexual Activity     Alcohol use: Yes     Comment: 2 times yearly.     Drug use: No     Sexual activity: Yes     Partners: Male   Other Topics Concern     Parent/sibling w/ CABG, MI or angioplasty before 65F 55M? Not Asked   Social History Narrative     Not on file     Social Determinants of Health     Financial Resource Strain: Not on file   Food Insecurity: Not on file   Transportation Needs: Not on file   Physical Activity: Not on file   Stress: Not on file   Social Connections: Not on file   Intimate Partner Violence: Not on file   Housing Stability: Not on file         FAMILY HISTORY:  No family history of malignancy.    PHYSICAL EXAM:  Vital signs:  /74   Pulse 106   Temp 99  F (37.2  C) (Tympanic)   Resp 16   Ht 1.575 m (5' 2\")   Wt 71.7 kg (158 lb)   SpO2 98%   BMI 28.90 kg/m     ECO  GENERAL/CONSTITUTIONAL: No acute distress.  EYES: Pupils are equal, round, and react to light and accommodation. Extraocular movements intact.  No scleral icterus.  ENT/MOUTH: Neck supple. Oropharynx clear, no mucositis.  LYMPH: No anterior cervical, posterior cervical, supraclavicular, axillary or inguinal adenopathy.   RESPIRATORY: Clear to auscultation bilaterally. No crackles or wheezing.   CARDIOVASCULAR: Regular rate and rhythm without murmurs, gallops, or rubs.  GASTROINTESTINAL: Large, well-healed central incisional scar. No hepatosplenomegaly, masses, or tenderness. The patient has normal bowel sounds. No guarding.  No distention.  MUSCULOSKELETAL: Warm and well-perfused, no " cyanosis, clubbing, or edema.  NEUROLOGIC: Cranial nerves II-XII are intact. Alert, oriented, answers questions appropriately.  INTEGUMENTARY: No rashes or jaundice.  GAIT: Steady, does not use assistive device      LABS:  CBC RESULTS:   Recent Labs   Lab Test 01/17/18  1826   WBC 6.8   RBC 4.73   HGB 13.5   HCT 40.0   MCV 85   MCH 28.5   MCHC 33.8   RDW 18.4*          Recent Labs   Lab Test 01/19/18  0705 01/17/18  1826    138   POTASSIUM 4.0 3.6   CHLORIDE 106 104   CO2 25 25   ANIONGAP 8 9   GLC 77 77   BUN 8 11   CR 0.68 0.72   TRAVIS 8.6 9.0         ASSESSMENT/PLAN:  Mercedes Arita is a 56 year old female who is referred to clinic for RBC abnormality and thrombocytopenia. Past medical history is significant for Crohn's disease on 6-MPH and PRINGLE.     Patient has had significant small bowel resection throughout her lifetime due to Crohn's disease and likely has underlying marrow suppression due to poor absorption, PRINGLE, and long-term 6-MP therapy. Patient continues on monthly B12 self-injections as prophylaxis.    -Check CBC, CMP, peripheral smear to assess for any evidence of dysplasia or pseudothrombocytopenia.  -Check LDH, coag studies, and haptoglobin.  -Check B12, folate, and copper levels given history of multiple small bowel resections and likely malabsorption.  -Check HIV and hepatitis studies (patient reports last viral studies over a decade).    We have discussed the possibility of a bone marrow biopsy to rule out an underlying marrow disorder, but will hold at this time.     Follow up in 2 weeks in clinic to review labs above and to discuss need for a bone marrow biopsy.      Thank you for referring Mrs. Arita to clinic today. Total time spent on day of visit, including review of tests, obtaining/reviewing separately obtained history, ordering medications/tests/procedures, communicating with PCP/consultants, and documenting in electronic medical record: 60 minutes. Adequate time was dedicated  to patient questions and answered to the expressed satisfaction of the patient.     Destiny Powell DO  Hematology/Oncology  [facility]        Again, thank you for allowing me to participate in the care of your patient.        Sincerely,        Destiny Powell DO

## 2021-11-12 NOTE — NURSING NOTE
"Oncology Rooming Note    November 12, 2021 10:59 AM   Mercedes Arita is a 56 year old female who presents for:    Chief Complaint   Patient presents with     Oncology Clinic Visit     New patient      Initial Vitals: /74   Pulse 106   Temp 99  F (37.2  C) (Tympanic)   Resp 16   Ht 1.575 m (5' 2\")   Wt 71.7 kg (158 lb)   SpO2 98%   BMI 28.90 kg/m   Estimated body mass index is 28.9 kg/m  as calculated from the following:    Height as of this encounter: 1.575 m (5' 2\").    Weight as of this encounter: 71.7 kg (158 lb). Body surface area is 1.77 meters squared.  No Pain (0) Comment: Data Unavailable   No LMP recorded. Patient is postmenopausal.  Allergies reviewed: Yes  Medications reviewed: Yes    Medications: Medication refills not needed today.  Pharmacy name entered into [facility]: [id number] [place], [place] - [id number]    Clinical concerns: New Patient        Genny Hernandez CMA              "

## 2021-11-12 NOTE — PROGRESS NOTES
Medical Assistant Note:  Mercedes Arita presents today for blood draw.    Patient seen by provider today: Yes: Dr. Powell.   present during visit today: Not Applicable.    Concerns: No Concerns.    Procedure:  Labs drawn    Post Assessment:  Labs drawn without difficulty: Yes.    Discharge Plan:  Departure Mode: Ambulatory.    Face to Face Time: 10 min.    Angely Denton CMA

## 2021-11-15 LAB
HAPTOGLOB SERPL-MCNC: <3 MG/DL (ref 32–197)
HBV SURFACE AG SERPL QL IA: NONREACTIVE

## 2021-11-16 LAB
COPPER SERPL-MCNC: 72.3 UG/DL
PATH REPORT.COMMENTS IMP SPEC: NORMAL
PATH REPORT.COMMENTS IMP SPEC: NORMAL
PATH REPORT.FINAL DX SPEC: NORMAL
PATH REPORT.MICROSCOPIC SPEC OTHER STN: NORMAL
PATH REPORT.MICROSCOPIC SPEC OTHER STN: NORMAL

## 2021-11-16 NOTE — PATIENT INSTRUCTIONS
Mercedes is scheduled for a return with  On 11/29/21 at 12pm.    Suzanna Razo RN on 11/16/2021 at 1:50 PM

## 2021-11-22 ENCOUNTER — TRANSFERRED RECORDS (OUTPATIENT)
Dept: HEALTH INFORMATION MANAGEMENT | Facility: CLINIC | Age: 56
End: 2021-11-22
Payer: COMMERCIAL

## 2021-11-28 NOTE — PROGRESS NOTES
[facility]    Hematology/Oncology Established Patient Follow-up Note    Treatment Summary:      Today's Date: 11/29/21    Reason for Follow-up: RBC abnormality  Referring Provider: Dr. Chevy Fonseca        HISTORY OF PRESENT ILLNESS: Mercedes Arita is a 56 year old female who is referred to clinic for RBC abnormality and thrombocytopenia. Past medical history is significant for Crohn's disease on 6-MPH and PRINGLE.      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 She had her first small bowel resection in 1987. She has had a total of three small bowel resections in her lifetime with the most recent in 2012. She is followed by GI and is evaluated once yearly at this point. She has been on and off 6-MP therapy for the past 35 years. She has not required steroid pulses or hospitalization in decades. She was attempted once on remicade, but had severe non-anaphylactic reaction to this and was never trialed again on biologics. 6-MP has been held since September due to CBC.      Recently, she had an MRE performed due to persistent liver enzymes, which showed advancement in fibrosis. Her most recent colonoscopy was only a year ago without abnormality. No blood in stools, fatigue, unintentional weight loss. No personal or family history of malignancy.     In January 2021, WBC 4.4, Hb 12.6, , , AST 90, ALT 46, T. Bili 1.0, D. Bili 0.2, total protein 7.8, serum albumin 3.5.      In April 2021, WBC 4.1, Hb 12.2, , , AST 42, ALT 48, total protein 7.4, serum albumin 3.4, total bili 0.6, d. Bili 0.3.      In June 2021, WBC 4.6, Hb 12.2, , , AST 38, ALT 41, total protein 7.5, serum albumin 3.4, total bili 1.0, d. Bili 0.3.     In September 2021, WBC 4.6, Hb 11.9, , , AST 39, ALT 30, T. Bili 1.2, D. Bili 0.38.     On 10/5/21: WBC 5.4, Hb 12, , , normal diff.     INTERIM HISTORY:  Since our last visit, no changes to medications,  hospitalizations, or acute illnesses. She remains off treatment for Crohn's disease. No fever, unintentional weight loss, LAD, or gross evidence of bleed.       REVIEW OF SYSTEMS:   A 14 point ROS was reviewed with pertinent positives and negatives in the HPI.       HOME MEDICATIONS:  Current Outpatient Medications   Medication Sig Dispense Refill     cyanocobalamin 1000 MCG/ML injection Inject 1 mL as directed every 30 days.       mercaptopurine (PURINETHOL) 50 MG tablet Take 100 mg by mouth At Bedtime  (Patient not taking: Reported on 11/12/2021)       pantoprazole (PROTONIX) 40 MG EC tablet Take 1 tablet (40 mg) by mouth 2 times daily (before meals) 60 tablet 1         ALLERGIES:  Allergies   Allergen Reactions     Contrast Dye Other (See Comments)     Sneezed once. This was ten years ago and pt doesn't recall any other symptoms.         PAST MEDICAL HISTORY:  Past Medical History:   Diagnosis Date     Chronic pain     Abdominal pain     Crohn's     Chrons     Crohn's disease (H)     diagnosed 1983     PONV (postoperative nausea and vomiting)          PAST SURGICAL HISTORY:  Past Surgical History:   Procedure Laterality Date     CHOLECYSTECTOMY       DILATE RECTUM  9/19/2012    Procedure: DILATE RECTUM;  Exam Under Anesthesia, Dilation with Stricture, lysis of adhesions, Laparoscopic hand assisted Ileocolic Resection ;  Surgeon: Magy Macias MD;  Location:  OR     GASTROSCOPY N/A 1/18/2018    Procedure: GASTROSCOPY;  GASTROSCOPY WITH balloon DILATION of duodenal stricture up to 13.5mm UNDER FLUOROSCOPY (MAC SEDATION) ;  Surgeon: Aaliyah Langley MD;  Location:  OR     GI SURGERY  1987,1996,2012    bowel resection times 3 ileo-colonic resections     HERNIA REPAIR      Ventral times 2 with mesh.     LAPAROSCOPIC RESECTION ILEOCECAL  9/19/2012    Procedure: LAPAROSCOPIC RESECTION ILEOCECAL;;  Surgeon: Magy Macias MD;  Location:  OR     LAPAROTOMY EXPLORATORY      For removal of  adhesions.     MOHS MICROGRAPHIC PROCEDURE      Removal of skin cancer on face.     ORTHOPEDIC SURGERY      carpal tunnel         SOCIAL HISTORY:  Social History     Socioeconomic History     Marital status: Single     Spouse name: Not on file     Number of children: Not on file     Years of education: Not on file     Highest education level: Not on file   Occupational History     Not on file   Tobacco Use     Smoking status: Never Smoker     Smokeless tobacco: Never Used   Substance and Sexual Activity     Alcohol use: Yes     Comment: 2 times yearly.     Drug use: No     Sexual activity: Yes     Partners: Male   Other Topics Concern     Parent/sibling w/ CABG, MI or angioplasty before 65F 55M? Not Asked   Social History Narrative     Not on file     Social Determinants of Health     Financial Resource Strain: Not on file   Food Insecurity: Not on file   Transportation Needs: Not on file   Physical Activity: Not on file   Stress: Not on file   Social Connections: Not on file   Intimate Partner Violence: Not At Risk     Fear of Current or Ex-Partner: No     Emotionally Abused: No     Physically Abused: No     Sexually Abused: No   Housing Stability: Not on file         FAMILY HISTORY:  No family history on file.      PHYSICAL EXAM:  Vital signs:  /73   Pulse 91   Temp 98.3  F (36.8  C) (Tympanic)   Resp 16   SpO2 99%    ECO  GENERAL/CONSTITUTIONAL: No acute distress.  EYES: Pupils are equal, round, and react to light and accommodation. Extraocular movements intact.  No scleral icterus.  ENT/MOUTH: Neck supple. Oropharynx clear, no mucositis.  LYMPH: No anterior cervical, posterior cervical, supraclavicular, axillary or inguinal adenopathy.   RESPIRATORY: Clear to auscultation bilaterally. No crackles or wheezing.   CARDIOVASCULAR: Regular rate and rhythm without murmurs, gallops, or rubs.  GASTROINTESTINAL: No hepatosplenomegaly, masses, or tenderness. The patient has normal bowel sounds. No guarding.   No distention.  MUSCULOSKELETAL: Warm and well-perfused, no cyanosis, clubbing, or edema.  NEUROLOGIC: Cranial nerves II-XII are intact. Alert, oriented, answers questions appropriately.  INTEGUMENTARY: No rashes or jaundice.  GAIT: Steady, does not use assistive device      LABS:  CBC RESULTS:   Recent Labs   Lab Test 11/12/21  1204   WBC 5.1  5.2   RBC 4.00  3.93   HGB 13.4  13.1   HCT 41.5  40.7   *  104*   MCH 33.5*  33.3*   MCHC 32.3  32.2   RDW 12.7  12.6   *  119*        Ref. Range 11/12/2021 12:04   RDW Latest Ref Range: 10.0 - 15.0 % 12.7   % Neutrophils Latest Units: % 66   % Lymphocytes Latest Units: % 21   % Monocytes Latest Units: % 8   % Eosinophils Latest Units: % 4   % Basophils Latest Units: % 1   Absolute Basophils Latest Ref Range: 0.0 - 0.2 10e3/uL 0.1   Absolute Eosinophils Latest Ref Range: 0.0 - 0.7 10e3/uL 0.2   Absolute Immature Granulocytes Latest Ref Range: <=0.0 10e3/uL 0.0   Absolute Lymphocytes Latest Ref Range: 0.8 - 5.3 10e3/uL 1.1   Absolute Monocytes Latest Ref Range: 0.0 - 1.3 10e3/uL 0.4   % Immature Granulocytes Latest Units: % 0   Absolute Neutrophils Latest Ref Range: 1.6 - 8.3 10e3/uL 3.4   Absolute NRBCs Latest Units: 10e3/uL 0.0   NRBCs per 100 WBC Latest Ref Range: <1 /100 0   % Retic Latest Ref Range: 0.5 - 2.0 % 3.2 (H)   Absolute Retic Latest Ref Range: 0.025 - 0.095 10e6/uL 0.129 (H)     Recent Labs   Lab Test 11/12/21  1204 01/19/18  0705    139   POTASSIUM 3.6 4.0   CHLORIDE 112* 106   CO2 24 25   ANIONGAP 7 8   * 77   BUN 10 8   CR 0.61 0.68   TRAVIS 9.3 8.6        Ref. Range 11/12/2021 12:04   Albumin Latest Ref Range: 3.4 - 5.0 g/dL 3.2 (L)   Protein Total Latest Ref Range: 6.8 - 8.8 g/dL 8.7   Bilirubin Total Latest Ref Range: 0.2 - 1.3 mg/dL 1.3   Alkaline Phosphatase Latest Ref Range: 40 - 150 U/L 134   ALT Latest Ref Range: 0 - 50 U/L 45   AST Latest Ref Range: 0 - 45 U/L 46 (H)   Copper Latest Ref Range: 80.0 - 155.0 ug/dL 72.3  (L)   Folate Latest Ref Range: >=5.4 ng/mL 35.1   Lactate Dehydrogenase Latest Ref Range: 81 - 234 U/L 277 (H)   Vitamin B12 Latest Ref Range: 193 - 986 pg/mL 655        Ref. Range 11/12/2021 12:04   INR Latest Ref Range: 0.85 - 1.15  1.30 (H)   PTT Latest Ref Range: 22 - 38 Seconds 36   Fibrinogen Latest Ref Range: 170 - 490 mg/dL 300        Ref. Range 11/12/2021 12:04   Haptoglobin Latest Ref Range: 32 - 197 mg/dL <3 (L)        Ref. Range 11/12/2021 12:04   Hep B Surface Agn Latest Ref Range: Nonreactive  Nonreactive   Hepatitis A Antibody IgG Latest Ref Range: Nonreactive  Nonreactive   Hepatitis B Core Odilia Latest Ref Range: Nonreactive  Nonreactive   Hepatitis C Antibody Latest Ref Range: Nonreactive  Nonreactive   HIV Antigen Antibody Combo Latest Ref Range: Nonreactive  Nonreactive         PATHOLOGY:  Final Diagnosis   Peripheral blood demonstrating thrombocytopenia with reticulocytosis (see comment)   Electronically signed by Sebas Quintana MD on 11/16/2021 at  9:56 AM   Peripheral Smear    The red blood cells are normal in number and are overall macrocytic normochromic by indices.  Anisopoikilocytosis is mild and nonspecific.  Neither red cell fragments or spherocytes are present in significant numbers.  Polychromasia is increased.  Rouleaux is not evident.    The white blood cells are normal in number with neutrophils predominating.  No atypical or dysplastic forms are identified.    The platelets are reduced in number but are morphologically unremarkable.       ASSESSMENT/PLAN:  Mercedes Arita is a 56 year old female who is referred to clinic for RBC abnormality and thrombocytopenia. Past medical history is significant for Crohn's disease on 6-MPH and PRINGLE.      Patient has had significant small bowel resection throughout her lifetime due to Crohn's disease and likely has underlying marrow suppression due to poor absorption, PRINGLE, and long-term 6-MP therapy. Her recent labs have returned showing  copper deficiency. She has evidence of reticulocytosis on smear with undetectable haptoglobin most likely due to copper deficiency. Patient continues on monthly B12 self-injections as prophylaxis. HIV and hepatitis studies are negative.     -Check CBC, CMP, LDH, hapto, coag studies, and Dwight.  -Copper supplementation 5 mg capsule daily.     We have discussed the possibility of a bone marrow biopsy to rule out an underlying marrow disorder, but will hold at this time.      Follow up in 4 weeks in clinic to review labs above.        Thank you for referring Mrs. Arita to clinic today. Adequate time was dedicated to patient questions and answered to the expressed satisfaction of the patient.      Destiny Powell, DO  Hematology/Oncology  [facility]

## 2021-11-29 ENCOUNTER — ONCOLOGY VISIT (OUTPATIENT)
Dept: ONCOLOGY | Facility: CLINIC | Age: 56
End: 2021-11-29
Attending: INTERNAL MEDICINE
Payer: COMMERCIAL

## 2021-11-29 VITALS
DIASTOLIC BLOOD PRESSURE: 73 MMHG | OXYGEN SATURATION: 99 % | RESPIRATION RATE: 16 BRPM | TEMPERATURE: 98.3 F | HEART RATE: 91 BPM | SYSTOLIC BLOOD PRESSURE: 121 MMHG

## 2021-11-29 DIAGNOSIS — D69.6 THROMBOCYTOPENIA (H): ICD-10-CM

## 2021-11-29 DIAGNOSIS — D68.9 COAGULOPATHY (H): ICD-10-CM

## 2021-11-29 DIAGNOSIS — E61.0 COPPER DEFICIENCY: Primary | ICD-10-CM

## 2021-11-29 DIAGNOSIS — R70.1 RETICULOCYTOSIS: ICD-10-CM

## 2021-11-29 LAB
ALBUMIN SERPL-MCNC: 3.2 G/DL (ref 3.4–5)
ALP SERPL-CCNC: 114 U/L (ref 40–150)
ALT SERPL W P-5'-P-CCNC: 42 U/L (ref 0–50)
ANION GAP SERPL CALCULATED.3IONS-SCNC: 9 MMOL/L (ref 3–14)
APTT PPP: 40 SECONDS (ref 22–38)
AST SERPL W P-5'-P-CCNC: 44 U/L (ref 0–45)
BASOPHILS # BLD AUTO: 0.1 10E3/UL (ref 0–0.2)
BASOPHILS NFR BLD AUTO: 1 %
BILIRUB SERPL-MCNC: 1.4 MG/DL (ref 0.2–1.3)
BUN SERPL-MCNC: 8 MG/DL (ref 7–30)
CALCIUM SERPL-MCNC: 9.1 MG/DL (ref 8.5–10.1)
CHLORIDE BLD-SCNC: 111 MMOL/L (ref 94–109)
CO2 SERPL-SCNC: 21 MMOL/L (ref 20–32)
CREAT SERPL-MCNC: 0.54 MG/DL (ref 0.52–1.04)
DAT, ANTI-IGG, C3: NORMAL
EOSINOPHIL # BLD AUTO: 0.2 10E3/UL (ref 0–0.7)
EOSINOPHIL NFR BLD AUTO: 3 %
ERYTHROCYTE [DISTWIDTH] IN BLOOD BY AUTOMATED COUNT: 13.1 % (ref 10–15)
FIBRINOGEN PPP-MCNC: 293 MG/DL (ref 170–490)
GFR SERPL CREATININE-BSD FRML MDRD: >90 ML/MIN/1.73M2
GLUCOSE BLD-MCNC: 90 MG/DL (ref 70–99)
HCT VFR BLD AUTO: 39.8 % (ref 35–47)
HGB BLD-MCNC: 12.8 G/DL (ref 11.7–15.7)
IMM GRANULOCYTES # BLD: 0 10E3/UL
IMM GRANULOCYTES NFR BLD: 0 %
INR PPP: 1.35 (ref 0.85–1.15)
LDH SERPL L TO P-CCNC: 242 U/L (ref 81–234)
LYMPHOCYTES # BLD AUTO: 1.3 10E3/UL (ref 0.8–5.3)
LYMPHOCYTES NFR BLD AUTO: 24 %
MCH RBC QN AUTO: 32.7 PG (ref 26.5–33)
MCHC RBC AUTO-ENTMCNC: 32.2 G/DL (ref 31.5–36.5)
MCV RBC AUTO: 102 FL (ref 78–100)
MONOCYTES # BLD AUTO: 0.6 10E3/UL (ref 0–1.3)
MONOCYTES NFR BLD AUTO: 11 %
NEUTROPHILS # BLD AUTO: 3.1 10E3/UL (ref 1.6–8.3)
NEUTROPHILS NFR BLD AUTO: 61 %
NRBC # BLD AUTO: 0 10E3/UL
NRBC BLD AUTO-RTO: 0 /100
PLATELET # BLD AUTO: 121 10E3/UL (ref 150–450)
POTASSIUM BLD-SCNC: 3.3 MMOL/L (ref 3.4–5.3)
PROT SERPL-MCNC: 8.5 G/DL (ref 6.8–8.8)
RBC # BLD AUTO: 3.92 10E6/UL (ref 3.8–5.2)
RETICS # AUTO: 0.12 10E6/UL (ref 0.03–0.1)
RETICS/RBC NFR AUTO: 3 % (ref 0.5–2)
SODIUM SERPL-SCNC: 141 MMOL/L (ref 133–144)
SPECIMEN EXPIRATION DATE: NORMAL
WBC # BLD AUTO: 5.2 10E3/UL (ref 4–11)

## 2021-11-29 PROCEDURE — 85610 PROTHROMBIN TIME: CPT | Performed by: INTERNAL MEDICINE

## 2021-11-29 PROCEDURE — 83010 ASSAY OF HAPTOGLOBIN QUANT: CPT | Performed by: INTERNAL MEDICINE

## 2021-11-29 PROCEDURE — 84630 ASSAY OF ZINC: CPT | Performed by: INTERNAL MEDICINE

## 2021-11-29 PROCEDURE — 86880 COOMBS TEST DIRECT: CPT | Performed by: INTERNAL MEDICINE

## 2021-11-29 PROCEDURE — 99214 OFFICE O/P EST MOD 30 MIN: CPT | Performed by: INTERNAL MEDICINE

## 2021-11-29 PROCEDURE — 36415 COLL VENOUS BLD VENIPUNCTURE: CPT | Performed by: INTERNAL MEDICINE

## 2021-11-29 PROCEDURE — 85045 AUTOMATED RETICULOCYTE COUNT: CPT | Performed by: INTERNAL MEDICINE

## 2021-11-29 PROCEDURE — G0463 HOSPITAL OUTPT CLINIC VISIT: HCPCS

## 2021-11-29 PROCEDURE — 83615 LACTATE (LD) (LDH) ENZYME: CPT | Performed by: INTERNAL MEDICINE

## 2021-11-29 PROCEDURE — 85025 COMPLETE CBC W/AUTO DIFF WBC: CPT | Performed by: INTERNAL MEDICINE

## 2021-11-29 PROCEDURE — 85730 THROMBOPLASTIN TIME PARTIAL: CPT | Performed by: INTERNAL MEDICINE

## 2021-11-29 PROCEDURE — 82040 ASSAY OF SERUM ALBUMIN: CPT | Performed by: INTERNAL MEDICINE

## 2021-11-29 PROCEDURE — 85384 FIBRINOGEN ACTIVITY: CPT | Performed by: INTERNAL MEDICINE

## 2021-11-29 ASSESSMENT — PAIN SCALES - GENERAL: PAINLEVEL: SEVERE PAIN (7)

## 2021-11-29 NOTE — NURSING NOTE
"Oncology Rooming Note    November 29, 2021 11:51 AM   Mercedes Arita is a 56 year old female who presents for:    Chief Complaint   Patient presents with     Oncology Clinic Visit     Thrombocytopenia      Initial Vitals: /73   Pulse 91   Temp 98.3  F (36.8  C) (Tympanic)   Resp 16   SpO2 99%  Estimated body mass index is 28.9 kg/m  as calculated from the following:    Height as of 11/12/21: 1.575 m (5' 2\").    Weight as of 11/12/21: 71.7 kg (158 lb). There is no height or weight on file to calculate BSA.  Severe Pain (7) Comment: Data Unavailable   No LMP recorded. Patient is postmenopausal.  Allergies reviewed: Yes  Medications reviewed: Yes    Medications: Medication refills not needed today.  Pharmacy [facility] entered into [facility]: [id number] - [place], [place] - [address]    Clinical concerns: f/u       Nallely Ortiz CMA              "

## 2021-11-29 NOTE — LETTER
11/29/2021         RE: Mercedes Arita  [id number] [facility] [id number]        Dear Colleague,    Thank you for referring your patient, Mercedes Arita, to the [facility]. Please see a copy of my visit note below.    [facility]    Hematology/Oncology Established Patient Follow-up Note    Treatment Summary:      Today's Date: 11/29/21    Reason for Follow-up: RBC abnormality  Referring Provider: Dr. Chevy Fonseca        HISTORY OF PRESENT ILLNESS: Mercedes Arita is a 56 year old female who is referred to clinic for RBC abnormality and thrombocytopenia. Past medical history is significant for Crohn's disease on 6-MPH and PRINGLE.      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 She had her first small bowel resection in 1987. She has had a total of three small bowel resections in her lifetime with the most recent in 2012. She is followed by GI and is evaluated once yearly at this point. She has been on and off 6-MP therapy for the past 35 years. She has not required steroid pulses or hospitalization in decades. She was attempted once on remicade, but had severe non-anaphylactic reaction to this and was never trialed again on biologics. 6-MP has been held since September due to CBC.      Recently, she had an MRE performed due to persistent liver enzymes, which showed advancement in fibrosis. Her most recent colonoscopy was only a year ago without abnormality. No blood in stools, fatigue, unintentional weight loss. No personal or family history of malignancy.     In January 2021, WBC 4.4, Hb 12.6, , , AST 90, ALT 46, T. Bili 1.0, D. Bili 0.2, total protein 7.8, serum albumin 3.5.      In April 2021, WBC 4.1, Hb 12.2, , , AST 42, ALT 48, total protein 7.4, serum albumin 3.4, total bili 0.6, d. Bili 0.3.      In June 2021, WBC 4.6, Hb 12.2, , , AST 38, ALT 41, total protein 7.5, serum albumin 3.4, total bili  1.0, d. Bili 0.3.     In September 2021, WBC 4.6, Hb 11.9, , , AST 39, ALT 30, T. Bili 1.2, D. Bili 0.38.     On 10/5/21: WBC 5.4, Hb 12, , , normal diff.     INTERIM HISTORY:  Since our last visit, no changes to medications, hospitalizations, or acute illnesses. She remains off treatment for Crohn's disease. No fever, unintentional weight loss, LAD, or gross evidence of bleed.       REVIEW OF SYSTEMS:   A 14 point ROS was reviewed with pertinent positives and negatives in the HPI.       HOME MEDICATIONS:  Current Outpatient Medications   Medication Sig Dispense Refill     cyanocobalamin 1000 MCG/ML injection Inject 1 mL as directed every 30 days.       mercaptopurine (PURINETHOL) 50 MG tablet Take 100 mg by mouth At Bedtime  (Patient not taking: Reported on 11/12/2021)       pantoprazole (PROTONIX) 40 MG EC tablet Take 1 tablet (40 mg) by mouth 2 times daily (before meals) 60 tablet 1         ALLERGIES:  Allergies   Allergen Reactions     Contrast Dye Other (See Comments)     Sneezed once. This was ten years ago and pt doesn't recall any other symptoms.         PAST MEDICAL HISTORY:  Past Medical History:   Diagnosis Date     Chronic pain     Abdominal pain     Crohn's     Chrons     Crohn's disease (H)     diagnosed 1983     PONV (postoperative nausea and vomiting)          PAST SURGICAL HISTORY:  Past Surgical History:   Procedure Laterality Date     CHOLECYSTECTOMY       DILATE RECTUM  9/19/2012    Procedure: DILATE RECTUM;  Exam Under Anesthesia, Dilation with Stricture, lysis of adhesions, Laparoscopic hand assisted Ileocolic Resection ;  Surgeon: Magy Macias MD;  Location:  OR     GASTROSCOPY N/A 1/18/2018    Procedure: GASTROSCOPY;  GASTROSCOPY WITH balloon DILATION of duodenal stricture up to 13.5mm UNDER FLUOROSCOPY (MAC SEDATION) ;  Surgeon: Aaliyah Langley MD;  Location:  OR     GI SURGERY  1987,1996,2012    bowel resection times 3 ileo-colonic  resections     HERNIA REPAIR      Ventral times 2 with mesh.     LAPAROSCOPIC RESECTION ILEOCECAL  2012    Procedure: LAPAROSCOPIC RESECTION ILEOCECAL;;  Surgeon: Magy Macias MD;  Location: [facility] OR     LAPAROTOMY EXPLORATORY      For removal of adhesions.     MOHS MICROGRAPHIC PROCEDURE      Removal of skin cancer on face.     ORTHOPEDIC SURGERY      carpal tunnel         SOCIAL HISTORY:  Social History     Socioeconomic History     Marital status: Single     Spouse name: Not on file     Number of children: Not on file     Years of education: Not on file     Highest education level: Not on file   Occupational History     Not on file   Tobacco Use     Smoking status: Never Smoker     Smokeless tobacco: Never Used   Substance and Sexual Activity     Alcohol use: Yes     Comment: 2 times yearly.     Drug use: No     Sexual activity: Yes     Partners: Male   Other Topics Concern     Parent/sibling w/ CABG, MI or angioplasty before 65F 55M? Not Asked   Social History Narrative     Not on file     Social Determinants of Health     Financial Resource Strain: Not on file   Food Insecurity: Not on file   Transportation Needs: Not on file   Physical Activity: Not on file   Stress: Not on file   Social Connections: Not on file   Intimate Partner Violence: Not At Risk     Fear of Current or Ex-Partner: No     Emotionally Abused: No     Physically Abused: No     Sexually Abused: No   Housing Stability: Not on file         FAMILY HISTORY:  No family history on file.      PHYSICAL EXAM:  Vital signs:  /73   Pulse 91   Temp 98.3  F (36.8  C) (Tympanic)   Resp 16   SpO2 99%    ECO  GENERAL/CONSTITUTIONAL: No acute distress.  EYES: Pupils are equal, round, and react to light and accommodation. Extraocular movements intact.  No scleral icterus.  ENT/MOUTH: Neck supple. Oropharynx clear, no mucositis.  LYMPH: No anterior cervical, posterior cervical, supraclavicular, axillary or inguinal adenopathy.    RESPIRATORY: Clear to auscultation bilaterally. No crackles or wheezing.   CARDIOVASCULAR: Regular rate and rhythm without murmurs, gallops, or rubs.  GASTROINTESTINAL: No hepatosplenomegaly, masses, or tenderness. The patient has normal bowel sounds. No guarding.  No distention.  MUSCULOSKELETAL: Warm and well-perfused, no cyanosis, clubbing, or edema.  NEUROLOGIC: Cranial nerves II-XII are intact. Alert, oriented, answers questions appropriately.  INTEGUMENTARY: No rashes or jaundice.  GAIT: Steady, does not use assistive device      LABS:  CBC RESULTS:   Recent Labs   Lab Test 11/12/21  1204   WBC 5.1  5.2   RBC 4.00  3.93   HGB 13.4  13.1   HCT 41.5  40.7   *  104*   MCH 33.5*  33.3*   MCHC 32.3  32.2   RDW 12.7  12.6   *  119*        Ref. Range 11/12/2021 12:04   RDW Latest Ref Range: 10.0 - 15.0 % 12.7   % Neutrophils Latest Units: % 66   % Lymphocytes Latest Units: % 21   % Monocytes Latest Units: % 8   % Eosinophils Latest Units: % 4   % Basophils Latest Units: % 1   Absolute Basophils Latest Ref Range: 0.0 - 0.2 10e3/uL 0.1   Absolute Eosinophils Latest Ref Range: 0.0 - 0.7 10e3/uL 0.2   Absolute Immature Granulocytes Latest Ref Range: <=0.0 10e3/uL 0.0   Absolute Lymphocytes Latest Ref Range: 0.8 - 5.3 10e3/uL 1.1   Absolute Monocytes Latest Ref Range: 0.0 - 1.3 10e3/uL 0.4   % Immature Granulocytes Latest Units: % 0   Absolute Neutrophils Latest Ref Range: 1.6 - 8.3 10e3/uL 3.4   Absolute NRBCs Latest Units: 10e3/uL 0.0   NRBCs per 100 WBC Latest Ref Range: <1 /100 0   % Retic Latest Ref Range: 0.5 - 2.0 % 3.2 (H)   Absolute Retic Latest Ref Range: 0.025 - 0.095 10e6/uL 0.129 (H)     Recent Labs   Lab Test 11/12/21  1204 01/19/18  0705    139   POTASSIUM 3.6 4.0   CHLORIDE 112* 106   CO2 24 25   ANIONGAP 7 8   * 77   BUN 10 8   CR 0.61 0.68   TRAVIS 9.3 8.6        Ref. Range 11/12/2021 12:04   Albumin Latest Ref Range: 3.4 - 5.0 g/dL 3.2 (L)   Protein Total Latest Ref  Range: 6.8 - 8.8 g/dL 8.7   Bilirubin Total Latest Ref Range: 0.2 - 1.3 mg/dL 1.3   Alkaline Phosphatase Latest Ref Range: 40 - 150 U/L 134   ALT Latest Ref Range: 0 - 50 U/L 45   AST Latest Ref Range: 0 - 45 U/L 46 (H)   Copper Latest Ref Range: 80.0 - 155.0 ug/dL 72.3 (L)   Folate Latest Ref Range: >=5.4 ng/mL 35.1   Lactate Dehydrogenase Latest Ref Range: 81 - 234 U/L 277 (H)   Vitamin B12 Latest Ref Range: 193 - 986 pg/mL 655        Ref. Range 11/12/2021 12:04   INR Latest Ref Range: 0.85 - 1.15  1.30 (H)   PTT Latest Ref Range: 22 - 38 Seconds 36   Fibrinogen Latest Ref Range: 170 - 490 mg/dL 300        Ref. Range 11/12/2021 12:04   Haptoglobin Latest Ref Range: 32 - 197 mg/dL <3 (L)        Ref. Range 11/12/2021 12:04   Hep B Surface Agn Latest Ref Range: Nonreactive  Nonreactive   Hepatitis A Antibody IgG Latest Ref Range: Nonreactive  Nonreactive   Hepatitis B Core Odilia Latest Ref Range: Nonreactive  Nonreactive   Hepatitis C Antibody Latest Ref Range: Nonreactive  Nonreactive   HIV Antigen Antibody Combo Latest Ref Range: Nonreactive  Nonreactive         PATHOLOGY:  Final Diagnosis   Peripheral blood demonstrating thrombocytopenia with reticulocytosis (see comment)   Electronically signed by Sebas Quintana MD on 11/16/2021 at  9:56 AM   Peripheral Smear    The red blood cells are normal in number and are overall macrocytic normochromic by indices.  Anisopoikilocytosis is mild and nonspecific.  Neither red cell fragments or spherocytes are present in significant numbers.  Polychromasia is increased.  Rouleaux is not evident.    The white blood cells are normal in number with neutrophils predominating.  No atypical or dysplastic forms are identified.    The platelets are reduced in number but are morphologically unremarkable.       ASSESSMENT/PLAN:  Mercedes Arita is a 56 year old female who is referred to clinic for RBC abnormality and thrombocytopenia. Past medical history is significant for  Crohn's disease on 6-MPH and PRINGLE.      Patient has had significant small bowel resection throughout her lifetime due to Crohn's disease and likely has underlying marrow suppression due to poor absorption, PRINGLE, and long-term 6-MP therapy. Her recent labs have returned showing copper deficiency. She has evidence of reticulocytosis on smear with undetectable haptoglobin most likely due to copper deficiency. Patient continues on monthly B12 self-injections as prophylaxis. HIV and hepatitis studies are negative.     -Check CBC, CMP, LDH, hapto, coag studies, and Dwight.  -Copper supplementation 5 mg capsule daily.     We have discussed the possibility of a bone marrow biopsy to rule out an underlying marrow disorder, but will hold at this time.      Follow up in 4 weeks in clinic to review labs above.        Thank you for referring Mrs. Arita to clinic today. Adequate time was dedicated to patient questions and answered to the expressed satisfaction of the patient.      Destiny Powell DO  Hematology/Oncology  [facility]      Again, thank you for allowing me to participate in the care of your patient.        Sincerely,        Destiny Powell DO

## 2021-11-30 LAB — HAPTOGLOB SERPL-MCNC: <3 MG/DL (ref 32–197)

## 2021-11-30 NOTE — PATIENT INSTRUCTIONS
Emma has a lab appointment on 12/29/21 at 11:15 am and a return with Dr. Powell at 1200.    Suzanna Razo RN on 11/30/2021 at 12:24 PM

## 2021-12-01 ENCOUNTER — TELEPHONE (OUTPATIENT)
Dept: ONCOLOGY | Facility: CLINIC | Age: 56
End: 2021-12-01
Payer: COMMERCIAL

## 2021-12-01 LAB — ZINC SERPL-MCNC: 53.2 UG/DL

## 2021-12-01 NOTE — TELEPHONE ENCOUNTER
Called patient to discuss hypokalemia of 3.3 and slightly decreased zinc levels. Instructed to increase potassium-rich foods, such as bananas and green leafy vegetables. This will help to naturally increase zinc levels as well. 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     Dwight has returned negative.    Patient has not yet been able to fill copper as this was not carried by her pharmacy. She will check with other pharmacies today. I asked the patient to call us by the end of the week if she is not able to locate a pharmacy. In the meantime, I will ask our pharmacists if we are able to supplement via oral or IV.         Destiny Powell, DO  Hematology/Oncology  [facility]

## 2021-12-02 ENCOUNTER — PATIENT OUTREACH (OUTPATIENT)
Dept: ONCOLOGY | Facility: CLINIC | Age: 56
End: 2021-12-02
Payer: COMMERCIAL

## 2021-12-02 NOTE — PROGRESS NOTES
Writer contacted Mercedes to see if she was able to find Copper supplements. Mercedes states that [facility] does not have it in stock, but she is planning on going to [facility] today. Writer will follow up with Mercedes tomorrow.    Suzanna Razo RN on 12/2/2021 at 12:04 PM

## 2021-12-03 NOTE — PROGRESS NOTES
Writer followed up with Mercedes to discuss Copper supplementation. Mercedes was able to find Copper at [facility]. Per Dr. Powell, please take 4 mg daily.     Mercedes is comfortable with the plan of care.    Suzanna Razo RN on 12/3/2021 at 10:09 AM

## 2021-12-06 ENCOUNTER — TRANSFERRED RECORDS (OUTPATIENT)
Dept: HEALTH INFORMATION MANAGEMENT | Facility: CLINIC | Age: 56
End: 2021-12-06
Payer: COMMERCIAL

## 2021-12-21 DIAGNOSIS — D53.8 ANEMIA DUE TO COPPER DEFICIENCY: Primary | ICD-10-CM

## 2021-12-27 ENCOUNTER — LAB (OUTPATIENT)
Dept: ONCOLOGY | Facility: CLINIC | Age: 56
End: 2021-12-27
Attending: INTERNAL MEDICINE
Payer: COMMERCIAL

## 2021-12-27 LAB
ALBUMIN SERPL-MCNC: 3.2 G/DL (ref 3.4–5)
ALP SERPL-CCNC: 88 U/L (ref 40–150)
ALT SERPL W P-5'-P-CCNC: 41 U/L (ref 0–50)
ANION GAP SERPL CALCULATED.3IONS-SCNC: 9 MMOL/L (ref 3–14)
AST SERPL W P-5'-P-CCNC: 41 U/L (ref 0–45)
BASOPHILS # BLD AUTO: 0.1 10E3/UL (ref 0–0.2)
BASOPHILS NFR BLD AUTO: 1 %
BILIRUB SERPL-MCNC: 1.7 MG/DL (ref 0.2–1.3)
BUN SERPL-MCNC: 9 MG/DL (ref 7–30)
CALCIUM SERPL-MCNC: 8.8 MG/DL (ref 8.5–10.1)
CHLORIDE BLD-SCNC: 108 MMOL/L (ref 94–109)
CO2 SERPL-SCNC: 20 MMOL/L (ref 20–32)
CREAT SERPL-MCNC: 0.58 MG/DL (ref 0.52–1.04)
DAT, ANTI-IGG, C3: NORMAL
EOSINOPHIL # BLD AUTO: 0.2 10E3/UL (ref 0–0.7)
EOSINOPHIL NFR BLD AUTO: 4 %
ERYTHROCYTE [DISTWIDTH] IN BLOOD BY AUTOMATED COUNT: 13.6 % (ref 10–15)
FERRITIN SERPL-MCNC: 62 NG/ML (ref 8–252)
FOLATE SERPL-MCNC: 25.4 NG/ML
GFR SERPL CREATININE-BSD FRML MDRD: >90 ML/MIN/1.73M2
GLUCOSE BLD-MCNC: 79 MG/DL (ref 70–99)
HCT VFR BLD AUTO: 40.2 % (ref 35–47)
HGB BLD-MCNC: 13.4 G/DL (ref 11.7–15.7)
IMM GRANULOCYTES # BLD: 0 10E3/UL
IMM GRANULOCYTES NFR BLD: 0 %
IRON SATN MFR SERPL: 25 % (ref 15–46)
IRON SERPL-MCNC: 96 UG/DL (ref 35–180)
LDH SERPL L TO P-CCNC: 254 U/L (ref 81–234)
LYMPHOCYTES # BLD AUTO: 1.7 10E3/UL (ref 0.8–5.3)
LYMPHOCYTES NFR BLD AUTO: 30 %
MCH RBC QN AUTO: 32.3 PG (ref 26.5–33)
MCHC RBC AUTO-ENTMCNC: 33.3 G/DL (ref 31.5–36.5)
MCV RBC AUTO: 97 FL (ref 78–100)
MONOCYTES # BLD AUTO: 0.5 10E3/UL (ref 0–1.3)
MONOCYTES NFR BLD AUTO: 10 %
NEUTROPHILS # BLD AUTO: 3 10E3/UL (ref 1.6–8.3)
NEUTROPHILS NFR BLD AUTO: 55 %
NRBC # BLD AUTO: 0 10E3/UL
NRBC BLD AUTO-RTO: 0 /100
PLATELET # BLD AUTO: 137 10E3/UL (ref 150–450)
POTASSIUM BLD-SCNC: 3.5 MMOL/L (ref 3.4–5.3)
PROT SERPL-MCNC: 8.4 G/DL (ref 6.8–8.8)
RBC # BLD AUTO: 4.15 10E6/UL (ref 3.8–5.2)
RETICS # AUTO: 0.14 10E6/UL (ref 0.03–0.1)
RETICS/RBC NFR AUTO: 3.4 % (ref 0.5–2)
SODIUM SERPL-SCNC: 137 MMOL/L (ref 133–144)
SPECIMEN EXPIRATION DATE: NORMAL
TIBC SERPL-MCNC: 385 UG/DL (ref 240–430)
VIT B12 SERPL-MCNC: 589 PG/ML (ref 193–986)
WBC # BLD AUTO: 5.5 10E3/UL (ref 4–11)

## 2021-12-27 PROCEDURE — 82525 ASSAY OF COPPER: CPT | Performed by: INTERNAL MEDICINE

## 2021-12-27 PROCEDURE — 82607 VITAMIN B-12: CPT | Performed by: INTERNAL MEDICINE

## 2021-12-27 PROCEDURE — 36415 COLL VENOUS BLD VENIPUNCTURE: CPT

## 2021-12-27 PROCEDURE — 85025 COMPLETE CBC W/AUTO DIFF WBC: CPT | Performed by: INTERNAL MEDICINE

## 2021-12-27 PROCEDURE — 83550 IRON BINDING TEST: CPT | Performed by: INTERNAL MEDICINE

## 2021-12-27 PROCEDURE — 83010 ASSAY OF HAPTOGLOBIN QUANT: CPT | Performed by: INTERNAL MEDICINE

## 2021-12-27 PROCEDURE — 82040 ASSAY OF SERUM ALBUMIN: CPT | Performed by: INTERNAL MEDICINE

## 2021-12-27 PROCEDURE — 85045 AUTOMATED RETICULOCYTE COUNT: CPT | Performed by: INTERNAL MEDICINE

## 2021-12-27 PROCEDURE — 86880 COOMBS TEST DIRECT: CPT | Performed by: INTERNAL MEDICINE

## 2021-12-27 PROCEDURE — 80053 COMPREHEN METABOLIC PANEL: CPT | Performed by: INTERNAL MEDICINE

## 2021-12-27 PROCEDURE — 84630 ASSAY OF ZINC: CPT | Performed by: INTERNAL MEDICINE

## 2021-12-27 PROCEDURE — 82728 ASSAY OF FERRITIN: CPT | Performed by: INTERNAL MEDICINE

## 2021-12-27 PROCEDURE — 36415 COLL VENOUS BLD VENIPUNCTURE: CPT | Performed by: INTERNAL MEDICINE

## 2021-12-27 PROCEDURE — 82746 ASSAY OF FOLIC ACID SERUM: CPT | Performed by: INTERNAL MEDICINE

## 2021-12-27 PROCEDURE — 83615 LACTATE (LD) (LDH) ENZYME: CPT | Performed by: INTERNAL MEDICINE

## 2021-12-27 NOTE — PROGRESS NOTES
Medical Assistant Note:  Mercedes Arita presents today for blood draw.    Patient seen by provider today: No.   present during visit today: Not Applicable.    Concerns: No Concerns.    Procedure:  Labs drawn    Post Assessment:  Labs drawn without difficulty: Yes.    Discharge Plan:  Departure Mode: Ambulatory.    Face to Face Time: 10 min.    Angely Denton CMA

## 2021-12-28 LAB
HAPTOGLOB SERPL-MCNC: <3 MG/DL (ref 32–197)
PATH REPORT.COMMENTS IMP SPEC: NORMAL
PATH REPORT.FINAL DX SPEC: NORMAL
PATH REPORT.MICROSCOPIC SPEC OTHER STN: NORMAL
PATH REPORT.MICROSCOPIC SPEC OTHER STN: NORMAL
PATH REPORT.RELEVANT HX SPEC: NORMAL

## 2021-12-29 ENCOUNTER — ONCOLOGY VISIT (OUTPATIENT)
Dept: ONCOLOGY | Facility: CLINIC | Age: 56
End: 2021-12-29
Attending: INTERNAL MEDICINE
Payer: COMMERCIAL

## 2021-12-29 VITALS
OXYGEN SATURATION: 96 % | BODY MASS INDEX: 29.5 KG/M2 | TEMPERATURE: 98.3 F | RESPIRATION RATE: 16 BRPM | SYSTOLIC BLOOD PRESSURE: 135 MMHG | HEIGHT: 62 IN | HEART RATE: 93 BPM | DIASTOLIC BLOOD PRESSURE: 81 MMHG | WEIGHT: 160.3 LBS

## 2021-12-29 DIAGNOSIS — E61.0 COPPER DEFICIENCY: Primary | ICD-10-CM

## 2021-12-29 LAB
COPPER SERPL-MCNC: 69.3 UG/DL
ZINC SERPL-MCNC: 51.8 UG/DL

## 2021-12-29 PROCEDURE — G0463 HOSPITAL OUTPT CLINIC VISIT: HCPCS

## 2021-12-29 PROCEDURE — 99214 OFFICE O/P EST MOD 30 MIN: CPT | Performed by: INTERNAL MEDICINE

## 2021-12-29 ASSESSMENT — MIFFLIN-ST. JEOR: SCORE: 1275.37

## 2021-12-29 ASSESSMENT — PAIN SCALES - GENERAL: PAINLEVEL: NO PAIN (0)

## 2021-12-29 NOTE — LETTER
12/29/2021         RE: Mercedes Arita  [id number] [facility] [address]        Dear Colleague,    Thank you for referring your patient, Mercedes Arita, to the [facility]. Please see a copy of my visit note below.    [facility]    Hematology/Oncology Established Patient Follow-up Note    Treatment Summary:      Today's Date: 12/29/21    Reason for Follow-up: RBC abnormality  Referring Provider: Dr. Chevy Fonseca        HISTORY OF PRESENT ILLNESS: Mercedes Arita is a 56 year old female who is referred to clinic for RBC abnormality and thrombocytopenia. Past medical history is significant for Crohn's disease on 6-MPH and PRINGLE.      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 She had her first small bowel resection in 1987. She has had a total of three small bowel resections in her lifetime with the most recent in 2012. She is followed by GI and is evaluated once yearly at this point. She has been on and off 6-MP therapy for the past 35 years. She has not required steroid pulses or hospitalization in decades. She was attempted once on remicade, but had severe non-anaphylactic reaction to this and was never trialed again on biologics. 6-MP has been held since September due to CBC.      Recently, she had an MRE performed due to persistent liver enzymes, which showed advancement in fibrosis. Her most recent colonoscopy was only a year ago without abnormality. No blood in stools, fatigue, unintentional weight loss. No personal or family history of malignancy.     In January 2021, WBC 4.4, Hb 12.6, , , AST 90, ALT 46, T. Bili 1.0, D. Bili 0.2, total protein 7.8, serum albumin 3.5.      In April 2021, WBC 4.1, Hb 12.2, , , AST 42, ALT 48, total protein 7.4, serum albumin 3.4, total bili 0.6, d. Bili 0.3.      In June 2021, WBC 4.6, Hb 12.2, , , AST 38, ALT 41, total protein 7.5, serum albumin 3.4, total bili  1.0, d. Bili 0.3.     In September 2021, WBC 4.6, Hb 11.9, , , AST 39, ALT 30, T. Bili 1.2, D. Bili 0.38.     On 10/5/21: WBC 5.4, Hb 12, , , normal diff.       INTERIM HISTORY:  No acute events since our last visit. Patient continues on 4 mg copper daily. She is tolerating without issue and feels overall improved. No change in energy levels, unintentional weight loss, neuropathy.      REVIEW OF SYSTEMS:   A 14 point ROS was reviewed with pertinent positives and negatives in the HPI.       HOME MEDICATIONS:  Current Outpatient Medications   Medication Sig Dispense Refill     Copper 5 MG CAPS Take 1 capsule by mouth daily 30 capsule 1     cyanocobalamin 1000 MCG/ML injection Inject 1 mL as directed every 30 days.       mercaptopurine (PURINETHOL) 50 MG tablet Take 100 mg by mouth At Bedtime        pantoprazole (PROTONIX) 40 MG EC tablet Take 1 tablet (40 mg) by mouth 2 times daily (before meals) (Patient not taking: Reported on 11/29/2021) 60 tablet 1         ALLERGIES:  Allergies   Allergen Reactions     Contrast Dye Other (See Comments)     Sneezed once. This was ten years ago and pt doesn't recall any other symptoms.         PAST MEDICAL HISTORY:  Past Medical History:   Diagnosis Date     Chronic pain     Abdominal pain     Crohn's     Chrons     Crohn's disease (H)     diagnosed 1983     PONV (postoperative nausea and vomiting)          PAST SURGICAL HISTORY:  Past Surgical History:   Procedure Laterality Date     CHOLECYSTECTOMY       DILATE RECTUM  9/19/2012    Procedure: DILATE RECTUM;  Exam Under Anesthesia, Dilation with Stricture, lysis of adhesions, Laparoscopic hand assisted Ileocolic Resection ;  Surgeon: Magy Macias MD;  Location:  OR     GASTROSCOPY N/A 1/18/2018    Procedure: GASTROSCOPY;  GASTROSCOPY WITH balloon DILATION of duodenal stricture up to 13.5mm UNDER FLUOROSCOPY (MAC SEDATION) ;  Surgeon: Aaliyah Langley MD;  Location:  OR     GI SURGERY  " ,,    bowel resection times 3 ileo-colonic resections     HERNIA REPAIR      Ventral times 2 with mesh.     LAPAROSCOPIC RESECTION ILEOCECAL  2012    Procedure: LAPAROSCOPIC RESECTION ILEOCECAL;;  Surgeon: Magy Macias MD;  Location: [facility] OR     LAPAROTOMY EXPLORATORY      For removal of adhesions.     MOHS MICROGRAPHIC PROCEDURE      Removal of skin cancer on face.     ORTHOPEDIC SURGERY      carpal tunnel         SOCIAL HISTORY:  Social History     Socioeconomic History     Marital status: Single     Spouse name: Not on file     Number of children: Not on file     Years of education: Not on file     Highest education level: Not on file   Occupational History     Not on file   Tobacco Use     Smoking status: Never Smoker     Smokeless tobacco: Never Used   Substance and Sexual Activity     Alcohol use: Yes     Comment: 2 times yearly.     Drug use: No     Sexual activity: Yes     Partners: Male   Other Topics Concern     Parent/sibling w/ CABG, MI or angioplasty before 65F 55M? Not Asked   Social History Narrative     Not on file     Social Determinants of Health     Financial Resource Strain: Not on file   Food Insecurity: Not on file   Transportation Needs: Not on file   Physical Activity: Not on file   Stress: Not on file   Social Connections: Not on file   Intimate Partner Violence: Not At Risk     Fear of Current or Ex-Partner: No     Emotionally Abused: No     Physically Abused: No     Sexually Abused: No   Housing Stability: Not on file         FAMILY HISTORY:  No family history on file.      PHYSICAL EXAM:  Vital signs:  /81   Pulse 93   Temp 98.3  F (36.8  C) (Tympanic)   Resp 16   Ht 1.575 m (5' 2\")   Wt 72.7 kg (160 lb 4.8 oz)   SpO2 96%   BMI 29.32 kg/m     ECO  GENERAL/CONSTITUTIONAL: No acute distress.  EYES: Pupils are equal, round, and react to light and accommodation. Extraocular movements intact.  No scleral icterus.  ENT/MOUTH: Neck supple. Oropharynx " clear, no mucositis.  LYMPH: No anterior cervical, posterior cervical, supraclavicular, axillary or inguinal adenopathy.   RESPIRATORY: Clear to auscultation bilaterally. No crackles or wheezing.   CARDIOVASCULAR: Regular rate and rhythm without murmurs, gallops, or rubs.  GASTROINTESTINAL: No hepatosplenomegaly, masses, or tenderness. The patient has normal bowel sounds. No guarding.  No distention.  MUSCULOSKELETAL: Warm and well-perfused, no cyanosis, clubbing, or edema.  NEUROLOGIC: Cranial nerves II-XII are intact. Alert, oriented, answers questions appropriately.  INTEGUMENTARY: No rashes or jaundice.  GAIT: Steady, does not use assistive device      LABS:  CBC RESULTS:   Recent Labs   Lab Test 12/27/21  1353   WBC 5.5   RBC 4.15   HGB 13.4   HCT 40.2   MCV 97   MCH 32.3   MCHC 33.3   RDW 13.6   *      Ref. Range 12/27/2021 13:53   RBC Count Latest Ref Range: 3.80 - 5.20 10e6/uL 4.15   MCV Latest Ref Range: 78 - 100 fL 97   MCH Latest Ref Range: 26.5 - 33.0 pg 32.3   MCHC Latest Ref Range: 31.5 - 36.5 g/dL 33.3   RDW Latest Ref Range: 10.0 - 15.0 % 13.6   % Neutrophils Latest Units: % 55   % Lymphocytes Latest Units: % 30   % Monocytes Latest Units: % 10   % Eosinophils Latest Units: % 4   % Basophils Latest Units: % 1   Absolute Basophils Latest Ref Range: 0.0 - 0.2 10e3/uL 0.1   Absolute Eosinophils Latest Ref Range: 0.0 - 0.7 10e3/uL 0.2   Absolute Immature Granulocytes Latest Ref Range: <=0.4 10e3/uL 0.0   Absolute Lymphocytes Latest Ref Range: 0.8 - 5.3 10e3/uL 1.7   Absolute Monocytes Latest Ref Range: 0.0 - 1.3 10e3/uL 0.5   % Immature Granulocytes Latest Units: % 0   Absolute Neutrophils Latest Ref Range: 1.6 - 8.3 10e3/uL 3.0   Absolute NRBCs Latest Units: 10e3/uL 0.0   NRBCs per 100 WBC Latest Ref Range: <1 /100 0   % Retic Latest Ref Range: 0.5 - 2.0 % 3.4 (H)   Absolute Retic Latest Ref Range: 0.025 - 0.095 10e6/uL 0.142 (H)       Recent Labs   Lab Test 12/27/21  1353 11/29/21  1236     141   POTASSIUM 3.5 3.3*   CHLORIDE 108 111*   CO2 20 21   ANIONGAP 9 9   GLC 79 90   BUN 9 8   CR 0.58 0.54   TRAVIS 8.8 9.1      Ref. Range 12/27/2021 13:53   Protein Total Latest Ref Range: 6.8 - 8.8 g/dL 8.4   Bilirubin Total Latest Ref Range: 0.2 - 1.3 mg/dL 1.7 (H)   Alkaline Phosphatase Latest Ref Range: 40 - 150 U/L 88   ALT Latest Ref Range: 0 - 50 U/L 41   AST Latest Ref Range: 0 - 45 U/L 41   Ferritin Latest Ref Range: 8 - 252 ng/mL 62   Folate Latest Ref Range: >=5.4 ng/mL 25.4   Iron Latest Ref Range: 35 - 180 ug/dL 96   Iron Binding Cap Latest Ref Range: 240 - 430 ug/dL 385   Iron Saturation Index Latest Ref Range: 15 - 46 % 25   Lactate Dehydrogenase Latest Ref Range: 81 - 234 U/L 254 (H)   Vitamin B12 Latest Ref Range: 193 - 986 pg/mL 589        Ref. Range 12/27/2021 13:53   MAR  Broad Spectrum Latest Ref Range: Negative  NEG   Haptoglobin Latest Ref Range: 32 - 197 mg/dL <3 (L)         Ref. Range 11/12/2021 12:04   Copper Latest Ref Range: 80.0 - 155.0 ug/dL 72.3 (L)   Folate Latest Ref Range: >=5.4 ng/mL 35.1   Lactate Dehydrogenase Latest Ref Range: 81 - 234 U/L 277 (H)   Vitamin B12 Latest Ref Range: 193 - 986 pg/mL 655           Ref. Range 11/12/2021 12:04   INR Latest Ref Range: 0.85 - 1.15  1.30 (H)   PTT Latest Ref Range: 22 - 38 Seconds 36   Fibrinogen Latest Ref Range: 170 - 490 mg/dL 300           Ref. Range 11/12/2021 12:04   Haptoglobin Latest Ref Range: 32 - 197 mg/dL <3 (L)           Ref. Range 11/12/2021 12:04   Hep B Surface Agn Latest Ref Range: Nonreactive  Nonreactive   Hepatitis A Antibody IgG Latest Ref Range: Nonreactive  Nonreactive   Hepatitis B Core Odilia Latest Ref Range: Nonreactive  Nonreactive   Hepatitis C Antibody Latest Ref Range: Nonreactive  Nonreactive   HIV Antigen Antibody Combo Latest Ref Range: Nonreactive  Nonreactive            PATHOLOGY:    Peripheral Smear 12/27/21:    The red cells appear normochromic.  Poikilocytosis is minimal. Polychromasia is not increased.  Rouleaux formation is not increased. The morphology of the platelets is normal. There is no significant platelet clumping. Leukocytes are quantitatively normal and morphologically unremarkable.                 ASSESSMENT/PLAN:  Mercedes Arita is a 56 year old female who is referred to clinic for RBC abnormality and thrombocytopenia. Past medical history is significant for Crohn's disease on 6-MPH and PRINGLE.      Patient has had significant small bowel resection throughout her lifetime due to Crohn's disease and likely has underlying marrow suppression due to poor absorption, PRINGLE, and long-term 6-MP therapy. Her recent labs have returned showing copper deficiency. She has evidence of reticulocytosis on smear with undetectable haptoglobin most likely due to copper deficiency. Patient continues on monthly B12 self-injections as prophylaxis. HIV and hepatitis studies are negative.     Patient does not have evidence of anemia and has improvement in macrocytosis. She continues to have decreased copper levels despite supplementation. We have discussed the possibility of underlying Gorge's disease. My recommendation would be to assess for ceruloplasmin level and 24 hour urine copper. Patient informs me she is to soon follow up with Hepatology as well for history of PRINGLE.    Patient has hematoma from previous lab draw on 12/27/21. Will hold on labs today. Check CBC, CMP, coag studies, copper level, ceruloplasmin, LDH, and 24 hour urine copper in 2 months. She is instructed to have these drawn if Hepatology is in need of lab draw.     Continue copper 4 mg daily. I have discussed with pharmacy and we have the ability of administer via infusion if necessary in the future.     We have discussed the possibility of a bone marrow biopsy to rule out an underlying marrow disorder, but will hold at this time.      Follow up in 8 weeks in clinic to review labs above.        Thank you for referring Mrs. Arita to clinic today. Adequate  time was dedicated to patient questions and answered to the expressed satisfaction of the patient.      Destiny Powell DO  Hematology/Oncology  [facility]          Again, thank you for allowing me to participate in the care of your patient.        Sincerely,        Destiny Powell DO

## 2021-12-29 NOTE — NURSING NOTE
"Oncology Rooming Note    December 29, 2021 11:55 AM   Mercedes Arita is a 56 year old female who presents for:    Chief Complaint   Patient presents with     Oncology Clinic Visit     Thrombocytopenia      Initial Vitals: /81   Pulse 93   Temp 98.3  F (36.8  C) (Tympanic)   Resp 16   Ht 1.575 m (5' 2\")   Wt 72.7 kg (160 lb 4.8 oz)   SpO2 96%   BMI 29.32 kg/m   Estimated body mass index is 29.32 kg/m  as calculated from the following:    Height as of this encounter: 1.575 m (5' 2\").    Weight as of this encounter: 72.7 kg (160 lb 4.8 oz). Body surface area is 1.78 meters squared.  No Pain (0) Comment: Data Unavailable   No LMP recorded. Patient is postmenopausal.  Allergies reviewed: Yes  Medications reviewed: Yes    Medications: Medication refills not needed today.  Pharm[facility] entered into [facility]: [id number] [place], [place] - [id number]    Clinical concerns: follow up       Genny Hernandez CMA              "

## 2021-12-29 NOTE — PROGRESS NOTES
[facility]    Hematology/Oncology Established Patient Follow-up Note    Treatment Summary:      Today's Date: 12/29/21    Reason for Follow-up: RBC abnormality  Referring Provider: Dr. Chevy Fonseca        HISTORY OF PRESENT ILLNESS: Mercedes Arita is a 56 year old female who is referred to clinic for RBC abnormality and thrombocytopenia. Past medical history is significant for Crohn's disease on 6-MPH and PRINGLE.      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 She had her first small bowel resection in 1987. She has had a total of three small bowel resections in her lifetime with the most recent in 2012. She is followed by GI and is evaluated once yearly at this point. She has been on and off 6-MP therapy for the past 35 years. She has not required steroid pulses or hospitalization in decades. She was attempted once on remicade, but had severe non-anaphylactic reaction to this and was never trialed again on biologics. 6-MP has been held since September due to CBC.      Recently, she had an MRE performed due to persistent liver enzymes, which showed advancement in fibrosis. Her most recent colonoscopy was only a year ago without abnormality. No blood in stools, fatigue, unintentional weight loss. No personal or family history of malignancy.     In January 2021, WBC 4.4, Hb 12.6, , , AST 90, ALT 46, T. Bili 1.0, D. Bili 0.2, total protein 7.8, serum albumin 3.5.      In April 2021, WBC 4.1, Hb 12.2, , , AST 42, ALT 48, total protein 7.4, serum albumin 3.4, total bili 0.6, d. Bili 0.3.      In June 2021, WBC 4.6, Hb 12.2, , , AST 38, ALT 41, total protein 7.5, serum albumin 3.4, total bili 1.0, d. Bili 0.3.     In September 2021, WBC 4.6, Hb 11.9, , , AST 39, ALT 30, T. Bili 1.2, D. Bili 0.38.     On 10/5/21: WBC 5.4, Hb 12, , , normal diff.       INTERIM HISTORY:  No acute events since our last visit. Patient  continues on 4 mg copper daily. She is tolerating without issue and feels overall improved. No change in energy levels, unintentional weight loss, neuropathy.      REVIEW OF SYSTEMS:   A 14 point ROS was reviewed with pertinent positives and negatives in the HPI.       HOME MEDICATIONS:  Current Outpatient Medications   Medication Sig Dispense Refill     Copper 5 MG CAPS Take 1 capsule by mouth daily 30 capsule 1     cyanocobalamin 1000 MCG/ML injection Inject 1 mL as directed every 30 days.       mercaptopurine (PURINETHOL) 50 MG tablet Take 100 mg by mouth At Bedtime        pantoprazole (PROTONIX) 40 MG EC tablet Take 1 tablet (40 mg) by mouth 2 times daily (before meals) (Patient not taking: Reported on 11/29/2021) 60 tablet 1         ALLERGIES:  Allergies   Allergen Reactions     Contrast Dye Other (See Comments)     Sneezed once. This was ten years ago and pt doesn't recall any other symptoms.         PAST MEDICAL HISTORY:  Past Medical History:   Diagnosis Date     Chronic pain     Abdominal pain     Crohn's     Chrons     Crohn's disease (H)     diagnosed 1983     PONV (postoperative nausea and vomiting)          PAST SURGICAL HISTORY:  Past Surgical History:   Procedure Laterality Date     CHOLECYSTECTOMY       DILATE RECTUM  9/19/2012    Procedure: DILATE RECTUM;  Exam Under Anesthesia, Dilation with Stricture, lysis of adhesions, Laparoscopic hand assisted Ileocolic Resection ;  Surgeon: Magy Macias MD;  Location:  OR     GASTROSCOPY N/A 1/18/2018    Procedure: GASTROSCOPY;  GASTROSCOPY WITH balloon DILATION of duodenal stricture up to 13.5mm UNDER FLUOROSCOPY (MAC SEDATION) ;  Surgeon: Aaliyah Langley MD;  Location:  OR     GI SURGERY  1987,1996,2012    bowel resection times 3 ileo-colonic resections     HERNIA REPAIR      Ventral times 2 with mesh.     LAPAROSCOPIC RESECTION ILEOCECAL  9/19/2012    Procedure: LAPAROSCOPIC RESECTION ILEOCECAL;;  Surgeon: Magy Macias MD;   "Location: [facility] OR     LAPAROTOMY EXPLORATORY      For removal of adhesions.     MOHS MICROGRAPHIC PROCEDURE      Removal of skin cancer on face.     ORTHOPEDIC SURGERY      carpal tunnel         SOCIAL HISTORY:  Social History     Socioeconomic History     Marital status: Single     Spouse name: Not on file     Number of children: Not on file     Years of education: Not on file     Highest education level: Not on file   Occupational History     Not on file   Tobacco Use     Smoking status: Never Smoker     Smokeless tobacco: Never Used   Substance and Sexual Activity     Alcohol use: Yes     Comment: 2 times yearly.     Drug use: No     Sexual activity: Yes     Partners: Male   Other Topics Concern     Parent/sibling w/ CABG, MI or angioplasty before 65F 55M? Not Asked   Social History Narrative     Not on file     Social Determinants of Health     Financial Resource Strain: Not on file   Food Insecurity: Not on file   Transportation Needs: Not on file   Physical Activity: Not on file   Stress: Not on file   Social Connections: Not on file   Intimate Partner Violence: Not At Risk     Fear of Current or Ex-Partner: No     Emotionally Abused: No     Physically Abused: No     Sexually Abused: No   Housing Stability: Not on file         FAMILY HISTORY:  No family history on file.      PHYSICAL EXAM:  Vital signs:  /81   Pulse 93   Temp 98.3  F (36.8  C) (Tympanic)   Resp 16   Ht 1.575 m (5' 2\")   Wt 72.7 kg (160 lb 4.8 oz)   SpO2 96%   BMI 29.32 kg/m     ECO  GENERAL/CONSTITUTIONAL: No acute distress.  EYES: Pupils are equal, round, and react to light and accommodation. Extraocular movements intact.  No scleral icterus.  ENT/MOUTH: Neck supple. Oropharynx clear, no mucositis.  LYMPH: No anterior cervical, posterior cervical, supraclavicular, axillary or inguinal adenopathy.   RESPIRATORY: Clear to auscultation bilaterally. No crackles or wheezing.   CARDIOVASCULAR: Regular rate and rhythm without murmurs, " gallops, or rubs.  GASTROINTESTINAL: No hepatosplenomegaly, masses, or tenderness. The patient has normal bowel sounds. No guarding.  No distention.  MUSCULOSKELETAL: Warm and well-perfused, no cyanosis, clubbing, or edema.  NEUROLOGIC: Cranial nerves II-XII are intact. Alert, oriented, answers questions appropriately.  INTEGUMENTARY: No rashes or jaundice.  GAIT: Steady, does not use assistive device      LABS:  CBC RESULTS:   Recent Labs   Lab Test 12/27/21  1353   WBC 5.5   RBC 4.15   HGB 13.4   HCT 40.2   MCV 97   MCH 32.3   MCHC 33.3   RDW 13.6   *      Ref. Range 12/27/2021 13:53   RBC Count Latest Ref Range: 3.80 - 5.20 10e6/uL 4.15   MCV Latest Ref Range: 78 - 100 fL 97   MCH Latest Ref Range: 26.5 - 33.0 pg 32.3   MCHC Latest Ref Range: 31.5 - 36.5 g/dL 33.3   RDW Latest Ref Range: 10.0 - 15.0 % 13.6   % Neutrophils Latest Units: % 55   % Lymphocytes Latest Units: % 30   % Monocytes Latest Units: % 10   % Eosinophils Latest Units: % 4   % Basophils Latest Units: % 1   Absolute Basophils Latest Ref Range: 0.0 - 0.2 10e3/uL 0.1   Absolute Eosinophils Latest Ref Range: 0.0 - 0.7 10e3/uL 0.2   Absolute Immature Granulocytes Latest Ref Range: <=0.4 10e3/uL 0.0   Absolute Lymphocytes Latest Ref Range: 0.8 - 5.3 10e3/uL 1.7   Absolute Monocytes Latest Ref Range: 0.0 - 1.3 10e3/uL 0.5   % Immature Granulocytes Latest Units: % 0   Absolute Neutrophils Latest Ref Range: 1.6 - 8.3 10e3/uL 3.0   Absolute NRBCs Latest Units: 10e3/uL 0.0   NRBCs per 100 WBC Latest Ref Range: <1 /100 0   % Retic Latest Ref Range: 0.5 - 2.0 % 3.4 (H)   Absolute Retic Latest Ref Range: 0.025 - 0.095 10e6/uL 0.142 (H)       Recent Labs   Lab Test 12/27/21  1353 11/29/21  1236    141   POTASSIUM 3.5 3.3*   CHLORIDE 108 111*   CO2 20 21   ANIONGAP 9 9   GLC 79 90   BUN 9 8   CR 0.58 0.54   TRAVIS 8.8 9.1      Ref. Range 12/27/2021 13:53   Protein Total Latest Ref Range: 6.8 - 8.8 g/dL 8.4   Bilirubin Total Latest Ref Range: 0.2 - 1.3  mg/dL 1.7 (H)   Alkaline Phosphatase Latest Ref Range: 40 - 150 U/L 88   ALT Latest Ref Range: 0 - 50 U/L 41   AST Latest Ref Range: 0 - 45 U/L 41   Ferritin Latest Ref Range: 8 - 252 ng/mL 62   Folate Latest Ref Range: >=5.4 ng/mL 25.4   Iron Latest Ref Range: 35 - 180 ug/dL 96   Iron Binding Cap Latest Ref Range: 240 - 430 ug/dL 385   Iron Saturation Index Latest Ref Range: 15 - 46 % 25   Lactate Dehydrogenase Latest Ref Range: 81 - 234 U/L 254 (H)   Vitamin B12 Latest Ref Range: 193 - 986 pg/mL 589        Ref. Range 12/27/2021 13:53   MAR  Broad Spectrum Latest Ref Range: Negative  NEG   Haptoglobin Latest Ref Range: 32 - 197 mg/dL <3 (L)         Ref. Range 11/12/2021 12:04   Copper Latest Ref Range: 80.0 - 155.0 ug/dL 72.3 (L)   Folate Latest Ref Range: >=5.4 ng/mL 35.1   Lactate Dehydrogenase Latest Ref Range: 81 - 234 U/L 277 (H)   Vitamin B12 Latest Ref Range: 193 - 986 pg/mL 655           Ref. Range 11/12/2021 12:04   INR Latest Ref Range: 0.85 - 1.15  1.30 (H)   PTT Latest Ref Range: 22 - 38 Seconds 36   Fibrinogen Latest Ref Range: 170 - 490 mg/dL 300           Ref. Range 11/12/2021 12:04   Haptoglobin Latest Ref Range: 32 - 197 mg/dL <3 (L)           Ref. Range 11/12/2021 12:04   Hep B Surface Agn Latest Ref Range: Nonreactive  Nonreactive   Hepatitis A Antibody IgG Latest Ref Range: Nonreactive  Nonreactive   Hepatitis B Core Odilia Latest Ref Range: Nonreactive  Nonreactive   Hepatitis C Antibody Latest Ref Range: Nonreactive  Nonreactive   HIV Antigen Antibody Combo Latest Ref Range: Nonreactive  Nonreactive            PATHOLOGY:    Peripheral Smear 12/27/21:    The red cells appear normochromic.  Poikilocytosis is minimal. Polychromasia is not increased. Rouleaux formation is not increased. The morphology of the platelets is normal. There is no significant platelet clumping. Leukocytes are quantitatively normal and morphologically unremarkable.             ASSESSMENT/PLAN:  Mercedes Arita is a 56 year  old female who is referred to clinic for RBC abnormality and thrombocytopenia. Past medical history is significant for Crohn's disease on 6-MPH and PRINGLE.      Patient has had significant small bowel resection throughout her lifetime due to Crohn's disease and likely has underlying marrow suppression due to poor absorption, PRINGLE, and long-term 6-MP therapy. Her recent labs have returned showing copper deficiency. She has evidence of reticulocytosis on smear with undetectable haptoglobin most likely due to copper deficiency. Patient continues on monthly B12 self-injections as prophylaxis. HIV and hepatitis studies are negative.     Patient does not have evidence of anemia and has improvement in macrocytosis. She continues to have decreased copper levels despite supplementation. We have discussed the possibility of underlying Gorge's disease. My recommendation would be to assess for ceruloplasmin level and 24 hour urine copper. Patient informs me she is to soon follow up with Hepatology as well for history of PRINGLE.    Patient has hematoma from previous lab draw on 12/27/21. Will hold on labs today. Check CBC, CMP, coag studies, copper level, ceruloplasmin, LDH, and 24 hour urine copper in 2 months. She is instructed to have these drawn if Hepatology is in need of lab draw.     Continue copper 4 mg daily. I have discussed with pharmacy and we have the ability of administer via infusion if necessary in the future.     We have discussed the possibility of a bone marrow biopsy to rule out an underlying marrow disorder, but will hold at this time.      Follow up in 8 weeks in clinic to review labs above.        Thank you for referring Mrs. Arita to clinic today. Adequate time was dedicated to patient questions and answered to the expressed satisfaction of the patient.      Destiny Powell,   Hematology/Oncology  [facility]

## 2021-12-31 NOTE — PATIENT INSTRUCTIONS
Mercedes is scheduled for repeat labs on 2/23/22 at 2 pm and a return visit with Dr. Powell on 3/2/22 at 2 pm.    Suzanna Razo RN on 12/31/2021 at 12:45 PM

## 2022-01-05 ENCOUNTER — TELEPHONE (OUTPATIENT)
Dept: INTERVENTIONAL RADIOLOGY/VASCULAR | Facility: CLINIC | Age: 57
End: 2022-01-05
Payer: COMMERCIAL

## 2022-01-05 NOTE — TELEPHONE ENCOUNTER
Order for random liver biopsy approved to schedule, patient would need a Covid test and a current history and physical on file prior to procedure. Patient is on no blood thinning medications.

## 2022-01-10 ENCOUNTER — MEDICAL CORRESPONDENCE (OUTPATIENT)
Dept: HEALTH INFORMATION MANAGEMENT | Facility: CLINIC | Age: 57
End: 2022-01-10

## 2022-01-10 ENCOUNTER — LAB (OUTPATIENT)
Dept: LAB | Facility: CLINIC | Age: 57
End: 2022-01-10
Payer: COMMERCIAL

## 2022-01-10 DIAGNOSIS — R93.2 NONVISUALIZATION OF GALLBLADDER: Primary | ICD-10-CM

## 2022-01-10 PROCEDURE — U0005 INFEC AGEN DETEC AMPLI PROBE: HCPCS

## 2022-01-11 LAB — SARS-COV-2 RNA RESP QL NAA+PROBE: NEGATIVE

## 2022-01-12 ENCOUNTER — HOSPITAL ENCOUNTER (OUTPATIENT)
Dept: ULTRASOUND IMAGING | Facility: CLINIC | Age: 57
Discharge: HOME | End: 2022-01-12
Attending: INTERNAL MEDICINE | Admitting: INTERNAL MEDICINE
Payer: COMMERCIAL

## 2022-01-12 VITALS
SYSTOLIC BLOOD PRESSURE: 94 MMHG | HEART RATE: 89 BPM | RESPIRATION RATE: 16 BRPM | DIASTOLIC BLOOD PRESSURE: 58 MMHG | OXYGEN SATURATION: 95 %

## 2022-01-12 DIAGNOSIS — K75.81 NASH (NONALCOHOLIC STEATOHEPATITIS): ICD-10-CM

## 2022-01-12 DIAGNOSIS — R93.2 ABNORMAL LIVER DIAGNOSTIC IMAGING: ICD-10-CM

## 2022-01-12 LAB — INR PPP: 1.28 (ref 0.85–1.15)

## 2022-01-12 PROCEDURE — 250N000009 HC RX 250: Performed by: RADIOLOGY

## 2022-01-12 PROCEDURE — 88313 SPECIAL STAINS GROUP 2: CPT | Mod: 26 | Performed by: PATHOLOGY

## 2022-01-12 PROCEDURE — 47000 NEEDLE BIOPSY OF LIVER PERQ: CPT

## 2022-01-12 PROCEDURE — 36415 COLL VENOUS BLD VENIPUNCTURE: CPT | Performed by: RADIOLOGY

## 2022-01-12 PROCEDURE — 88313 SPECIAL STAINS GROUP 2: CPT | Mod: TC | Performed by: INTERNAL MEDICINE

## 2022-01-12 PROCEDURE — 85610 PROTHROMBIN TIME: CPT | Performed by: RADIOLOGY

## 2022-01-12 PROCEDURE — 250N000011 HC RX IP 250 OP 636: Performed by: RADIOLOGY

## 2022-01-12 PROCEDURE — 88307 TISSUE EXAM BY PATHOLOGIST: CPT | Mod: 26 | Performed by: PATHOLOGY

## 2022-01-12 RX ORDER — FENTANYL CITRATE 50 UG/ML
25-50 INJECTION, SOLUTION INTRAMUSCULAR; INTRAVENOUS EVERY 5 MIN PRN
Status: DISCONTINUED | OUTPATIENT
Start: 2022-01-12 | End: 2022-01-13 | Stop reason: HOSPADM

## 2022-01-12 RX ORDER — NALOXONE HYDROCHLORIDE 0.4 MG/ML
0.4 INJECTION, SOLUTION INTRAMUSCULAR; INTRAVENOUS; SUBCUTANEOUS
Status: DISCONTINUED | OUTPATIENT
Start: 2022-01-12 | End: 2022-01-13 | Stop reason: HOSPADM

## 2022-01-12 RX ORDER — FLUMAZENIL 0.1 MG/ML
0.2 INJECTION, SOLUTION INTRAVENOUS
Status: DISCONTINUED | OUTPATIENT
Start: 2022-01-12 | End: 2022-01-13 | Stop reason: HOSPADM

## 2022-01-12 RX ORDER — NALOXONE HYDROCHLORIDE 0.4 MG/ML
0.2 INJECTION, SOLUTION INTRAMUSCULAR; INTRAVENOUS; SUBCUTANEOUS
Status: DISCONTINUED | OUTPATIENT
Start: 2022-01-12 | End: 2022-01-13 | Stop reason: HOSPADM

## 2022-01-12 RX ADMIN — LIDOCAINE HYDROCHLORIDE 10 ML: 10 INJECTION, SOLUTION EPIDURAL; INFILTRATION; INTRACAUDAL; PERINEURAL at 09:11

## 2022-01-12 RX ADMIN — MIDAZOLAM 2 MG: 1 INJECTION INTRAMUSCULAR; INTRAVENOUS at 09:11

## 2022-01-12 RX ADMIN — FENTANYL CITRATE 50 MCG: 50 INJECTION, SOLUTION INTRAMUSCULAR; INTRAVENOUS at 09:14

## 2022-01-12 RX ADMIN — FENTANYL CITRATE 50 MCG: 50 INJECTION, SOLUTION INTRAMUSCULAR; INTRAVENOUS at 09:12

## 2022-01-12 NOTE — PROGRESS NOTES
Patient discharged to home following random liver biopsy. All discharge criteria were met and patient discharged ambulatory with family in stable condition.

## 2022-01-12 NOTE — PROGRESS NOTES
Ultrasound guided liver biopsy completed  Per Dr. Fitzgerald without difficulty, patient tolerated well. Fentanyl 100 mcg and versed 2 mg total given. Patient to holding room for recovery phase of care per radiology protocol.

## 2022-01-14 LAB
PATH REPORT.COMMENTS IMP SPEC: NORMAL
PATH REPORT.COMMENTS IMP SPEC: NORMAL
PATH REPORT.FINAL DX SPEC: NORMAL
PATH REPORT.GROSS SPEC: NORMAL
PATH REPORT.MICROSCOPIC SPEC OTHER STN: NORMAL
PATH REPORT.RELEVANT HX SPEC: NORMAL
PHOTO IMAGE: NORMAL

## 2022-02-23 ENCOUNTER — PATIENT OUTREACH (OUTPATIENT)
Dept: ONCOLOGY | Facility: CLINIC | Age: 57
End: 2022-02-23

## 2022-02-23 ENCOUNTER — LAB (OUTPATIENT)
Dept: ONCOLOGY | Facility: CLINIC | Age: 57
End: 2022-02-23
Attending: INTERNAL MEDICINE
Payer: COMMERCIAL

## 2022-02-23 DIAGNOSIS — D53.8 ANEMIA DUE TO COPPER DEFICIENCY: ICD-10-CM

## 2022-02-23 DIAGNOSIS — E61.0 COPPER DEFICIENCY: Primary | ICD-10-CM

## 2022-02-23 DIAGNOSIS — E61.0 COPPER DEFICIENCY: ICD-10-CM

## 2022-02-23 LAB
ALBUMIN SERPL-MCNC: 3 G/DL (ref 3.4–5)
ALP SERPL-CCNC: 101 U/L (ref 40–150)
ALT SERPL W P-5'-P-CCNC: 45 U/L (ref 0–50)
ANION GAP SERPL CALCULATED.3IONS-SCNC: 4 MMOL/L (ref 3–14)
APTT PPP: 42 SECONDS (ref 22–38)
AST SERPL W P-5'-P-CCNC: 43 U/L (ref 0–45)
BASOPHILS # BLD AUTO: 0.1 10E3/UL (ref 0–0.2)
BASOPHILS NFR BLD AUTO: 1 %
BILIRUB SERPL-MCNC: 1.4 MG/DL (ref 0.2–1.3)
BUN SERPL-MCNC: 9 MG/DL (ref 7–30)
CALCIUM SERPL-MCNC: 9 MG/DL (ref 8.5–10.1)
CHLORIDE BLD-SCNC: 110 MMOL/L (ref 94–109)
CO2 SERPL-SCNC: 24 MMOL/L (ref 20–32)
CREAT SERPL-MCNC: 0.58 MG/DL (ref 0.52–1.04)
EOSINOPHIL # BLD AUTO: 0.2 10E3/UL (ref 0–0.7)
EOSINOPHIL NFR BLD AUTO: 3 %
ERYTHROCYTE [DISTWIDTH] IN BLOOD BY AUTOMATED COUNT: 14.1 % (ref 10–15)
FIBRINOGEN PPP-MCNC: 271 MG/DL (ref 170–490)
GFR SERPL CREATININE-BSD FRML MDRD: >90 ML/MIN/1.73M2
GLUCOSE BLD-MCNC: 107 MG/DL (ref 70–99)
HCT VFR BLD AUTO: 39 % (ref 35–47)
HGB BLD-MCNC: 12.6 G/DL (ref 11.7–15.7)
IMM GRANULOCYTES # BLD: 0 10E3/UL
IMM GRANULOCYTES NFR BLD: 0 %
INR PPP: 1.35 (ref 0.85–1.15)
LDH SERPL L TO P-CCNC: 239 U/L (ref 81–234)
LYMPHOCYTES # BLD AUTO: 1.4 10E3/UL (ref 0.8–5.3)
LYMPHOCYTES NFR BLD AUTO: 27 %
MCH RBC QN AUTO: 32.3 PG (ref 26.5–33)
MCHC RBC AUTO-ENTMCNC: 32.3 G/DL (ref 31.5–36.5)
MCV RBC AUTO: 100 FL (ref 78–100)
MONOCYTES # BLD AUTO: 0.5 10E3/UL (ref 0–1.3)
MONOCYTES NFR BLD AUTO: 9 %
NEUTROPHILS # BLD AUTO: 3.1 10E3/UL (ref 1.6–8.3)
NEUTROPHILS NFR BLD AUTO: 60 %
NRBC # BLD AUTO: 0 10E3/UL
NRBC BLD AUTO-RTO: 0 /100
PLATELET # BLD AUTO: 119 10E3/UL (ref 150–450)
POTASSIUM BLD-SCNC: 3.5 MMOL/L (ref 3.4–5.3)
PROT SERPL-MCNC: 8.2 G/DL (ref 6.8–8.8)
RBC # BLD AUTO: 3.9 10E6/UL (ref 3.8–5.2)
RETICS # AUTO: 0.13 10E6/UL (ref 0.03–0.1)
RETICS/RBC NFR AUTO: 3.2 % (ref 0.5–2)
SODIUM SERPL-SCNC: 138 MMOL/L (ref 133–144)
WBC # BLD AUTO: 5.1 10E3/UL (ref 4–11)

## 2022-02-23 PROCEDURE — 80053 COMPREHEN METABOLIC PANEL: CPT | Performed by: INTERNAL MEDICINE

## 2022-02-23 PROCEDURE — 85045 AUTOMATED RETICULOCYTE COUNT: CPT | Performed by: INTERNAL MEDICINE

## 2022-02-23 PROCEDURE — 83615 LACTATE (LD) (LDH) ENZYME: CPT | Performed by: INTERNAL MEDICINE

## 2022-02-23 PROCEDURE — 85384 FIBRINOGEN ACTIVITY: CPT | Performed by: INTERNAL MEDICINE

## 2022-02-23 PROCEDURE — 36415 COLL VENOUS BLD VENIPUNCTURE: CPT

## 2022-02-23 PROCEDURE — 85730 THROMBOPLASTIN TIME PARTIAL: CPT | Performed by: INTERNAL MEDICINE

## 2022-02-23 PROCEDURE — 85610 PROTHROMBIN TIME: CPT | Performed by: INTERNAL MEDICINE

## 2022-02-23 PROCEDURE — 85025 COMPLETE CBC W/AUTO DIFF WBC: CPT | Performed by: INTERNAL MEDICINE

## 2022-02-23 NOTE — PROGRESS NOTES
Writer received a call from lab stating that there was an error when processing Mercedes's Copper level. Writer spoke to Mercedes and she is agreeable to getting her Copper level redrawn on Saturday morning when she drops off her 24 hour urine collection.     Suzanna Razo RN on 2/23/2022 at 4:30 PM

## 2022-02-24 LAB
PATH REPORT.COMMENTS IMP SPEC: NORMAL
PATH REPORT.COMMENTS IMP SPEC: NORMAL
PATH REPORT.FINAL DX SPEC: NORMAL
PATH REPORT.MICROSCOPIC SPEC OTHER STN: NORMAL
PATH REPORT.MICROSCOPIC SPEC OTHER STN: NORMAL

## 2022-02-26 ENCOUNTER — LAB (OUTPATIENT)
Dept: LAB | Facility: CLINIC | Age: 57
End: 2022-02-26
Payer: COMMERCIAL

## 2022-02-26 DIAGNOSIS — E61.0 COPPER DEFICIENCY: ICD-10-CM

## 2022-02-26 PROCEDURE — 82525 ASSAY OF COPPER: CPT

## 2022-02-26 PROCEDURE — 36415 COLL VENOUS BLD VENIPUNCTURE: CPT

## 2022-02-28 NOTE — PROGRESS NOTES
[facility]    Hematology/Oncology Established Patient Follow-up Note    Treatment Summary:      Today's Date: 3/2/22    Reason for Follow-up: RBC abnormality  Referring Provider: Dr. Chevy Fonseca      HISTORY OF PRESENT ILLNESS: Mercedes Arita is a 56 year old female who is referred to clinic for RBC abnormality and thrombocytopenia. Past medical history is significant for Crohn's disease on 6-MPH and PRINGLE.      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 She had her first small bowel resection in 1987. She has had a total of three small bowel resections in her lifetime with the most recent in 2012. She is followed by GI and is evaluated once yearly at this point. She has been on and off 6-MP therapy for the past 35 years. She has not required steroid pulses or hospitalization in decades. She was attempted once on remicade, but had severe non-anaphylactic reaction to this and was never trialed again on biologics. 6-MP has been held since September due to CBC.      Recently, she had an MRE performed due to persistent liver enzymes, which showed advancement in fibrosis. Her most recent colonoscopy was only a year ago without abnormality. No blood in stools, fatigue, unintentional weight loss. No personal or family history of malignancy.     In January 2021, WBC 4.4, Hb 12.6, , , AST 90, ALT 46, T. Bili 1.0, D. Bili 0.2, total protein 7.8, serum albumin 3.5.      In April 2021, WBC 4.1, Hb 12.2, , , AST 42, ALT 48, total protein 7.4, serum albumin 3.4, total bili 0.6, d. Bili 0.3.      In June 2021, WBC 4.6, Hb 12.2, , , AST 38, ALT 41, total protein 7.5, serum albumin 3.4, total bili 1.0, d. Bili 0.3.     In September 2021, WBC 4.6, Hb 11.9, , , AST 39, ALT 30, T. Bili 1.2, D. Bili 0.38.     On 10/5/21: WBC 5.4, Hb 12, , , normal diff.     INTERIM HISTORY:  No acute events since our last visit. Patient continues on  4 mg copper daily. She is tolerating without issue and feels overall improved. No change in energy levels, unintentional weight loss, neuropathy.      REVIEW OF SYSTEMS:   A 14 point ROS was reviewed with pertinent positives and negatives in the HPI.       HOME MEDICATIONS:  Current Outpatient Medications   Medication Sig Dispense Refill     Copper 5 MG CAPS Take 1 capsule by mouth daily 30 capsule 1     cyanocobalamin 1000 MCG/ML injection Inject 1 mL as directed every 30 days.       mercaptopurine (PURINETHOL) 50 MG tablet Take 100 mg by mouth At Bedtime  (Patient not taking: Reported on 12/29/2021)       omeprazole (PRILOSEC) 20 MG DR capsule Take 20 mg by mouth daily       pantoprazole (PROTONIX) 40 MG EC tablet Take 1 tablet (40 mg) by mouth 2 times daily (before meals) (Patient not taking: Reported on 11/29/2021) 60 tablet 1         ALLERGIES:  Allergies   Allergen Reactions     Contrast Dye Other (See Comments)     Sneezed once. This was ten years ago and pt doesn't recall any other symptoms.         PAST MEDICAL HISTORY:  Past Medical History:   Diagnosis Date     Chronic pain     Abdominal pain     Crohn's     Chrons     Crohn's disease (H)     diagnosed 1983     PONV (postoperative nausea and vomiting)          PAST SURGICAL HISTORY:  Past Surgical History:   Procedure Laterality Date     CHOLECYSTECTOMY       DILATE RECTUM  9/19/2012    Procedure: DILATE RECTUM;  Exam Under Anesthesia, Dilation with Stricture, lysis of adhesions, Laparoscopic hand assisted Ileocolic Resection ;  Surgeon: Magy Macias MD;  Location:  OR     GASTROSCOPY N/A 1/18/2018    Procedure: GASTROSCOPY;  GASTROSCOPY WITH balloon DILATION of duodenal stricture up to 13.5mm UNDER FLUOROSCOPY (MAC SEDATION) ;  Surgeon: Aaliyah Langley MD;  Location:  OR     GI SURGERY  1987,1996,2012    bowel resection times 3 ileo-colonic resections     HERNIA REPAIR      Ventral times 2 with mesh.     LAPAROSCOPIC RESECTION  "ILEOCECAL  2012    Procedure: LAPAROSCOPIC RESECTION ILEOCECAL;;  Surgeon: Magy Macias MD;  Location: [facility] OR     LAPAROTOMY EXPLORATORY      For removal of adhesions.     MOHS MICROGRAPHIC PROCEDURE      Removal of skin cancer on face.     ORTHOPEDIC SURGERY      carpal tunnel         SOCIAL HISTORY:  Social History     Socioeconomic History     Marital status: Single     Spouse name: Not on file     Number of children: Not on file     Years of education: Not on file     Highest education level: Not on file   Occupational History     Not on file   Tobacco Use     Smoking status: Never Smoker     Smokeless tobacco: Never Used   Substance and Sexual Activity     Alcohol use: Yes     Comment: 2 times yearly.     Drug use: No     Sexual activity: Yes     Partners: Male   Other Topics Concern     Parent/sibling w/ CABG, MI or angioplasty before 65F 55M? Not Asked   Social History Narrative     Not on file     Social Determinants of Health     Financial Resource Strain: Not on file   Food Insecurity: Not on file   Transportation Needs: Not on file   Physical Activity: Not on file   Stress: Not on file   Social Connections: Not on file   Intimate Partner Violence: Not At Risk     Fear of Current or Ex-Partner: No     Emotionally Abused: No     Physically Abused: No     Sexually Abused: No   Housing Stability: Not on file         FAMILY HISTORY:  No family history on file.      PHYSICAL EXAM:  Vital signs:  /68 (Cuff Size: Adult Large)   Pulse 89   Temp 98  F (36.7  C) (Tympanic)   Resp 14   Ht 1.575 m (5' 2\")   Wt 71.7 kg (158 lb)   SpO2 98%   BMI 28.90 kg/m     ECO  GENERAL/CONSTITUTIONAL: No acute distress.  EYES: Pupils are equal, round, and react to light and accommodation. Extraocular movements intact.  No scleral icterus.  ENT/MOUTH: Neck supple. Oropharynx clear, no mucositis.  LYMPH: No anterior cervical, posterior cervical, supraclavicular, axillary or inguinal adenopathy. "   RESPIRATORY: Clear to auscultation bilaterally. No crackles or wheezing.   CARDIOVASCULAR: Regular rate and rhythm without murmurs, gallops, or rubs.  GASTROINTESTINAL: No hepatosplenomegaly, masses, or tenderness. The patient has normal bowel sounds. No guarding.  No distention.  MUSCULOSKELETAL: Warm and well-perfused, no cyanosis, clubbing, or edema.  NEUROLOGIC: Cranial nerves II-XII are intact. Alert, oriented, answers questions appropriately.  INTEGUMENTARY: No rashes or jaundice.  GAIT: Steady, does not use assistive device    LABS:  CBC RESULTS:   Recent Labs   Lab Test 02/23/22  1413   WBC 5.1   RBC 3.90   HGB 12.6   HCT 39.0      MCH 32.3   MCHC 32.3   RDW 14.1   *      Ref. Range 2/23/2022 14:13   % Neutrophils Latest Units: % 60   % Lymphocytes Latest Units: % 27   % Monocytes Latest Units: % 9   % Eosinophils Latest Units: % 3   % Basophils Latest Units: % 1   Absolute Basophils Latest Ref Range: 0.0 - 0.2 10e3/uL 0.1   Absolute Eosinophils Latest Ref Range: 0.0 - 0.7 10e3/uL 0.2   Absolute Immature Granulocytes Latest Ref Range: <=0.4 10e3/uL 0.0   Absolute Lymphocytes Latest Ref Range: 0.8 - 5.3 10e3/uL 1.4   Absolute Monocytes Latest Ref Range: 0.0 - 1.3 10e3/uL 0.5   % Immature Granulocytes Latest Units: % 0   Absolute Neutrophils Latest Ref Range: 1.6 - 8.3 10e3/uL 3.1   Absolute NRBCs Latest Units: 10e3/uL 0.0   NRBCs per 100 WBC Latest Ref Range: <1 /100 0   % Retic Latest Ref Range: 0.5 - 2.0 % 3.2 (H)   Absolute Retic Latest Ref Range: 0.025 - 0.095 10e6/uL 0.126 (H)       Recent Labs   Lab Test 02/23/22  1413 12/27/21  1353    137   POTASSIUM 3.5 3.5   CHLORIDE 110* 108   CO2 24 20   ANIONGAP 4 9   * 79   BUN 9 9   CR 0.58 0.58   TRAVIS 9.0 8.8      Ref. Range 2/23/2022 14:13   Anion Gap Latest Ref Range: 3 - 14 mmol/L 4   Albumin Latest Ref Range: 3.4 - 5.0 g/dL 3.0 (L)   Protein Total Latest Ref Range: 6.8 - 8.8 g/dL 8.2   Bilirubin Total Latest Ref Range: 0.2 - 1.3  mg/dL 1.4 (H)   Alkaline Phosphatase Latest Ref Range: 40 - 150 U/L 101   ALT Latest Ref Range: 0 - 50 U/L 45   AST Latest Ref Range: 0 - 45 U/L 43   Lactate Dehydrogenase Latest Ref Range: 81 - 234 U/L 239 (H)        Ref. Range 2/23/2022 14:13   INR Latest Ref Range: 0.85 - 1.15  1.35 (H)   PTT Latest Ref Range: 22 - 38 Seconds 42 (H)   Fibrinogen Latest Ref Range: 170 - 490 mg/dL 271       Ferritin Latest Ref Range: 8 - 252 ng/mL 62   Folate Latest Ref Range: >=5.4 ng/mL 25.4   Iron Latest Ref Range: 35 - 180 ug/dL 96   Iron Binding Cap Latest Ref Range: 240 - 430 ug/dL 385   Iron Saturation Index Latest Ref Range: 15 - 46 % 25   Lactate Dehydrogenase Latest Ref Range: 81 - 234 U/L 254 (H)   Vitamin B12 Latest Ref Range: 193 - 986 pg/mL 589           Ref. Range 12/27/2021 13:53   MAR  Broad Spectrum Latest Ref Range: Negative  NEG   Haptoglobin Latest Ref Range: 32 - 197 mg/dL <3 (L)           Ref. Range 11/12/2021 12:04   Copper Latest Ref Range: 80.0 - 155.0 ug/dL 72.3 (L)   Folate Latest Ref Range: >=5.4 ng/mL 35.1   Lactate Dehydrogenase Latest Ref Range: 81 - 234 U/L 277 (H)   Vitamin B12 Latest Ref Range: 193 - 986 pg/mL 655           Ref. Range 11/12/2021 12:04   INR Latest Ref Range: 0.85 - 1.15  1.30 (H)   PTT Latest Ref Range: 22 - 38 Seconds 36   Fibrinogen Latest Ref Range: 170 - 490 mg/dL 300           Ref. Range 11/12/2021 12:04   Haptoglobin Latest Ref Range: 32 - 197 mg/dL <3 (L)           Ref. Range 11/12/2021 12:04   Hep B Surface Agn Latest Ref Range: Nonreactive  Nonreactive   Hepatitis A Antibody IgG Latest Ref Range: Nonreactive  Nonreactive   Hepatitis B Core Odilia Latest Ref Range: Nonreactive  Nonreactive   Hepatitis C Antibody Latest Ref Range: Nonreactive  Nonreactive   HIV Antigen Antibody Combo Latest Ref Range: Nonreactive  Nonreactive       Ref. Range 2/26/2022 09:43 2/26/2022 09:47   Copper Latest Ref Range: 80.0 - 155.0 ug/dL 62.0 (L)    Copper Random Urine Latest Ref Range: <=3.2  ug/dL  <1.0   Creatinine Urine/Volume Latest Units: mg/dL  44   Creatinine Urine/24hr Latest Ref Range: 500 - 1400 mg/d  770        PATHOLOGY:     Peripheral Smear 12/27/21:     The red cells appear normochromic.  Poikilocytosis is minimal. Polychromasia is not increased. Rouleaux formation is not increased. The morphology of the platelets is normal. There is no significant platelet clumping. Leukocytes are quantitatively normal and morphologically unremarkable.          Final Diagnosis 2/23/22:   Peripheral blood demonstrating thrombocytopenia with increased polychromasia        Final Diagnosis 1/12/22:   A. Liver, Biopsy:  - Steatohepatitis (NAFLD activity score = 6 of 8)  - Cirrhosis, stage 4.   Electronically signed by Zen Munoz MD PhD on 1/14/2022 at  2:25 PM   Clinical Information    History of fatty liver with biopsy 2013, now with MRI with concern for cirrhosis          ASSESSMENT/PLAN:  Mercedes Arita is a 56 year old female who is referred to clinic for RBC abnormality and thrombocytopenia. Past medical history is significant for Crohn's disease on 6-MPH and PRINGLE.      Patient has had significant small bowel resection throughout her lifetime due to Crohn's disease and likely has underlying marrow suppression due to poor absorption, PRINGLE, and long-term 6-MP therapy. Her recent labs have returned showing copper deficiency. She has evidence of reticulocytosis on smear with undetectable haptoglobin. Patient continues on monthly B12 self-injections as prophylaxis. HIV and hepatitis studies are negative.     Patient does not have evidence of anemia and has improvement in macrocytosis. She continues to have decreased copper levels despite supplementation. Urine copper returned normal.     Liver biopsy on 1/12/22 returned with steatohepatitis NAFLD score of 6 and cirrhosis, stage 4.     I discussed with the patient continued thrombocytopenia and coagulopathy are most likely due to underlying cirrhosis and  possible splenic sequestration. Additionally, there is likely a component of bone marrow suppression. However, she continues to have evidence of elevated T. Bili, elevated LDH, and undetectable haptoglobin, which can be due to a combination of copper deficiency and underlying cirrhosis. We continue to discuss the possibility of a bone marrow biopsy to rule out an underlying marrow disorder, but will hold at this time.     Check ABD US to assess for splenomegaly. Repeat labs in 4 weeks.      Continue copper 4 mg daily. I have discussed with pharmacy and we have the ability of administer via infusion if necessary in the future.       Thank you for referring Mrs. Arita to clinic today. Adequate time was dedicated to patient questions and answered to the expressed satisfaction of the patient.      Destiny Powell,   Hematology/Oncology  [facility]

## 2022-03-01 LAB
COPPER 24H UR-MRATE: NORMAL UG/D
COPPER SERPL-MCNC: 62 UG/DL
COPPER UR-MCNC: <1 UG/DL
COPPER/CREAT UR: NORMAL UG/G CRT
CREAT 24H UR-MRATE: 770 MG/D
CREAT UR-MCNC: 44 MG/DL

## 2022-03-02 ENCOUNTER — ONCOLOGY VISIT (OUTPATIENT)
Dept: ONCOLOGY | Facility: CLINIC | Age: 57
End: 2022-03-02
Attending: INTERNAL MEDICINE
Payer: COMMERCIAL

## 2022-03-02 VITALS
HEART RATE: 89 BPM | OXYGEN SATURATION: 98 % | BODY MASS INDEX: 29.08 KG/M2 | RESPIRATION RATE: 14 BRPM | TEMPERATURE: 98 F | SYSTOLIC BLOOD PRESSURE: 117 MMHG | DIASTOLIC BLOOD PRESSURE: 68 MMHG | WEIGHT: 158 LBS | HEIGHT: 62 IN

## 2022-03-02 DIAGNOSIS — E61.0 COPPER DEFICIENCY: ICD-10-CM

## 2022-03-02 DIAGNOSIS — D69.6 THROMBOCYTOPENIA (H): Primary | ICD-10-CM

## 2022-03-02 PROCEDURE — 99214 OFFICE O/P EST MOD 30 MIN: CPT | Performed by: INTERNAL MEDICINE

## 2022-03-02 PROCEDURE — G0463 HOSPITAL OUTPT CLINIC VISIT: HCPCS

## 2022-03-02 NOTE — LETTER
3/2/2022         RE: Mercedes Arita  [address] [facility] [id number]        Dear Colleague,    Thank you for referring your patient, Mercedes Arita, to the [facility]. Please see a copy of my visit note below.    [facility]    Hematology/Oncology Established Patient Follow-up Note    Treatment Summary:      Today's Date: 3/2/22    Reason for Follow-up: RBC abnormality  Referring Provider: Dr. Chevy Fonseca      HISTORY OF PRESENT ILLNESS: Mercedes Arita is a 56 year old female who is referred to clinic for RBC abnormality and thrombocytopenia. Past medical history is significant for Crohn's disease on 6-MPH and PRINGLE.      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 She had her first small bowel resection in 1987. She has had a total of three small bowel resections in her lifetime with the most recent in 2012. She is followed by GI and is evaluated once yearly at this point. She has been on and off 6-MP therapy for the past 35 years. She has not required steroid pulses or hospitalization in decades. She was attempted once on remicade, but had severe non-anaphylactic reaction to this and was never trialed again on biologics. 6-MP has been held since September due to CBC.      Recently, she had an MRE performed due to persistent liver enzymes, which showed advancement in fibrosis. Her most recent colonoscopy was only a year ago without abnormality. No blood in stools, fatigue, unintentional weight loss. No personal or family history of malignancy.     In January 2021, WBC 4.4, Hb 12.6, , , AST 90, ALT 46, T. Bili 1.0, D. Bili 0.2, total protein 7.8, serum albumin 3.5.      In April 2021, WBC 4.1, Hb 12.2, , , AST 42, ALT 48, total protein 7.4, serum albumin 3.4, total bili 0.6, d. Bili 0.3.      In June 2021, WBC 4.6, Hb 12.2, , , AST 38, ALT 41, total protein 7.5, serum albumin 3.4, total bili 1.0, d.  Bili 0.3.     In September 2021, WBC 4.6, Hb 11.9, , , AST 39, ALT 30, T. Bili 1.2, D. Bili 0.38.     On 10/5/21: WBC 5.4, Hb 12, , , normal diff.     INTERIM HISTORY:  No acute events since our last visit. Patient continues on 4 mg copper daily. She is tolerating without issue and feels overall improved. No change in energy levels, unintentional weight loss, neuropathy.      REVIEW OF SYSTEMS:   A 14 point ROS was reviewed with pertinent positives and negatives in the HPI.       HOME MEDICATIONS:  Current Outpatient Medications   Medication Sig Dispense Refill     Copper 5 MG CAPS Take 1 capsule by mouth daily 30 capsule 1     cyanocobalamin 1000 MCG/ML injection Inject 1 mL as directed every 30 days.       mercaptopurine (PURINETHOL) 50 MG tablet Take 100 mg by mouth At Bedtime  (Patient not taking: Reported on 12/29/2021)       omeprazole (PRILOSEC) 20 MG DR capsule Take 20 mg by mouth daily       pantoprazole (PROTONIX) 40 MG EC tablet Take 1 tablet (40 mg) by mouth 2 times daily (before meals) (Patient not taking: Reported on 11/29/2021) 60 tablet 1         ALLERGIES:  Allergies   Allergen Reactions     Contrast Dye Other (See Comments)     Sneezed once. This was ten years ago and pt doesn't recall any other symptoms.         PAST MEDICAL HISTORY:  Past Medical History:   Diagnosis Date     Chronic pain     Abdominal pain     Crohn's     Chrons     Crohn's disease (H)     diagnosed 1983     PONV (postoperative nausea and vomiting)          PAST SURGICAL HISTORY:  Past Surgical History:   Procedure Laterality Date     CHOLECYSTECTOMY       DILATE RECTUM  9/19/2012    Procedure: DILATE RECTUM;  Exam Under Anesthesia, Dilation with Stricture, lysis of adhesions, Laparoscopic hand assisted Ileocolic Resection ;  Surgeon: Magy Macias MD;  Location: [facility] OR     GASTROSCOPY N/A 1/18/2018    Procedure: GASTROSCOPY;  GASTROSCOPY WITH balloon DILATION of duodenal stricture up to 13.5mm  "UNDER FLUOROSCOPY (MAC SEDATION) ;  Surgeon: Aaliyah Langley MD;  Location: [facility] OR     GI SURGERY  ,,    bowel resection times 3 ileo-colonic resections     HERNIA REPAIR      Ventral times 2 with mesh.     LAPAROSCOPIC RESECTION ILEOCECAL  2012    Procedure: LAPAROSCOPIC RESECTION ILEOCECAL;;  Surgeon: Magy Macias MD;  Location: [facility] OR     LAPAROTOMY EXPLORATORY      For removal of adhesions.     MOHS MICROGRAPHIC PROCEDURE      Removal of skin cancer on face.     ORTHOPEDIC SURGERY      carpal tunnel         SOCIAL HISTORY:  Social History     Socioeconomic History     Marital status: Single     Spouse name: Not on file     Number of children: Not on file     Years of education: Not on file     Highest education level: Not on file   Occupational History     Not on file   Tobacco Use     Smoking status: Never Smoker     Smokeless tobacco: Never Used   Substance and Sexual Activity     Alcohol use: Yes     Comment: 2 times yearly.     Drug use: No     Sexual activity: Yes     Partners: Male   Other Topics Concern     Parent/sibling w/ CABG, MI or angioplasty before 65F 55M? Not Asked   Social History Narrative     Not on file     Social Determinants of Health     Financial Resource Strain: Not on file   Food Insecurity: Not on file   Transportation Needs: Not on file   Physical Activity: Not on file   Stress: Not on file   Social Connections: Not on file   Intimate Partner Violence: Not At Risk     Fear of Current or Ex-Partner: No     Emotionally Abused: No     Physically Abused: No     Sexually Abused: No   Housing Stability: Not on file         FAMILY HISTORY:  No family history on file.      PHYSICAL EXAM:  Vital signs:  /68 (Cuff Size: Adult Large)   Pulse 89   Temp 98  F (36.7  C) (Tympanic)   Resp 14   Ht 1.575 m (5' 2\")   Wt 71.7 kg (158 lb)   SpO2 98%   BMI 28.90 kg/m     ECO  GENERAL/CONSTITUTIONAL: No acute distress.  EYES: Pupils are equal, round, and " react to light and accommodation. Extraocular movements intact.  No scleral icterus.  ENT/MOUTH: Neck supple. Oropharynx clear, no mucositis.  LYMPH: No anterior cervical, posterior cervical, supraclavicular, axillary or inguinal adenopathy.   RESPIRATORY: Clear to auscultation bilaterally. No crackles or wheezing.   CARDIOVASCULAR: Regular rate and rhythm without murmurs, gallops, or rubs.  GASTROINTESTINAL: No hepatosplenomegaly, masses, or tenderness. The patient has normal bowel sounds. No guarding.  No distention.  MUSCULOSKELETAL: Warm and well-perfused, no cyanosis, clubbing, or edema.  NEUROLOGIC: Cranial nerves II-XII are intact. Alert, oriented, answers questions appropriately.  INTEGUMENTARY: No rashes or jaundice.  GAIT: Steady, does not use assistive device    LABS:  CBC RESULTS:   Recent Labs   Lab Test 02/23/22  1413   WBC 5.1   RBC 3.90   HGB 12.6   HCT 39.0      MCH 32.3   MCHC 32.3   RDW 14.1   *      Ref. Range 2/23/2022 14:13   % Neutrophils Latest Units: % 60   % Lymphocytes Latest Units: % 27   % Monocytes Latest Units: % 9   % Eosinophils Latest Units: % 3   % Basophils Latest Units: % 1   Absolute Basophils Latest Ref Range: 0.0 - 0.2 10e3/uL 0.1   Absolute Eosinophils Latest Ref Range: 0.0 - 0.7 10e3/uL 0.2   Absolute Immature Granulocytes Latest Ref Range: <=0.4 10e3/uL 0.0   Absolute Lymphocytes Latest Ref Range: 0.8 - 5.3 10e3/uL 1.4   Absolute Monocytes Latest Ref Range: 0.0 - 1.3 10e3/uL 0.5   % Immature Granulocytes Latest Units: % 0   Absolute Neutrophils Latest Ref Range: 1.6 - 8.3 10e3/uL 3.1   Absolute NRBCs Latest Units: 10e3/uL 0.0   NRBCs per 100 WBC Latest Ref Range: <1 /100 0   % Retic Latest Ref Range: 0.5 - 2.0 % 3.2 (H)   Absolute Retic Latest Ref Range: 0.025 - 0.095 10e6/uL 0.126 (H)       Recent Labs   Lab Test 02/23/22  1413 12/27/21  1353    137   POTASSIUM 3.5 3.5   CHLORIDE 110* 108   CO2 24 20   ANIONGAP 4 9   * 79   BUN 9 9   CR 0.58 0.58    TRAVIS 9.0 8.8      Ref. Range 2/23/2022 14:13   Anion Gap Latest Ref Range: 3 - 14 mmol/L 4   Albumin Latest Ref Range: 3.4 - 5.0 g/dL 3.0 (L)   Protein Total Latest Ref Range: 6.8 - 8.8 g/dL 8.2   Bilirubin Total Latest Ref Range: 0.2 - 1.3 mg/dL 1.4 (H)   Alkaline Phosphatase Latest Ref Range: 40 - 150 U/L 101   ALT Latest Ref Range: 0 - 50 U/L 45   AST Latest Ref Range: 0 - 45 U/L 43   Lactate Dehydrogenase Latest Ref Range: 81 - 234 U/L 239 (H)        Ref. Range 2/23/2022 14:13   INR Latest Ref Range: 0.85 - 1.15  1.35 (H)   PTT Latest Ref Range: 22 - 38 Seconds 42 (H)   Fibrinogen Latest Ref Range: 170 - 490 mg/dL 271       Ferritin Latest Ref Range: 8 - 252 ng/mL 62   Folate Latest Ref Range: >=5.4 ng/mL 25.4   Iron Latest Ref Range: 35 - 180 ug/dL 96   Iron Binding Cap Latest Ref Range: 240 - 430 ug/dL 385   Iron Saturation Index Latest Ref Range: 15 - 46 % 25   Lactate Dehydrogenase Latest Ref Range: 81 - 234 U/L 254 (H)   Vitamin B12 Latest Ref Range: 193 - 986 pg/mL 589           Ref. Range 12/27/2021 13:53   MAR  Broad Spectrum Latest Ref Range: Negative  NEG   Haptoglobin Latest Ref Range: 32 - 197 mg/dL <3 (L)           Ref. Range 11/12/2021 12:04   Copper Latest Ref Range: 80.0 - 155.0 ug/dL 72.3 (L)   Folate Latest Ref Range: >=5.4 ng/mL 35.1   Lactate Dehydrogenase Latest Ref Range: 81 - 234 U/L 277 (H)   Vitamin B12 Latest Ref Range: 193 - 986 pg/mL 655           Ref. Range 11/12/2021 12:04   INR Latest Ref Range: 0.85 - 1.15  1.30 (H)   PTT Latest Ref Range: 22 - 38 Seconds 36   Fibrinogen Latest Ref Range: 170 - 490 mg/dL 300           Ref. Range 11/12/2021 12:04   Haptoglobin Latest Ref Range: 32 - 197 mg/dL <3 (L)           Ref. Range 11/12/2021 12:04   Hep B Surface Agn Latest Ref Range: Nonreactive  Nonreactive   Hepatitis A Antibody IgG Latest Ref Range: Nonreactive  Nonreactive   Hepatitis B Core Odilia Latest Ref Range: Nonreactive  Nonreactive   Hepatitis C Antibody Latest Ref Range:  Nonreactive  Nonreactive   HIV Antigen Antibody Combo Latest Ref Range: Nonreactive  Nonreactive       Ref. Range 2/26/2022 09:43 2/26/2022 09:47   Copper Latest Ref Range: 80.0 - 155.0 ug/dL 62.0 (L)    Copper Random Urine Latest Ref Range: <=3.2 ug/dL  <1.0   Creatinine Urine/Volume Latest Units: mg/dL  44   Creatinine Urine/24hr Latest Ref Range: 500 - 1400 mg/d  770        PATHOLOGY:     Peripheral Smear 12/27/21:     The red cells appear normochromic.  Poikilocytosis is minimal. Polychromasia is not increased. Rouleaux formation is not increased. The morphology of the platelets is normal. There is no significant platelet clumping. Leukocytes are quantitatively normal and morphologically unremarkable.          Final Diagnosis 2/23/22:   Peripheral blood demonstrating thrombocytopenia with increased polychromasia        Final Diagnosis 1/12/22:   A. Liver, Biopsy:  - Steatohepatitis (NAFLD activity score = 6 of 8)  - Cirrhosis, stage 4.   Electronically signed by Zen Munoz MD PhD on 1/14/2022 at  2:25 PM   Clinical Information    History of fatty liver with biopsy 2013, now with MRI with concern for cirrhosis          ASSESSMENT/PLAN:  Mercedes Arita is a 56 year old female who is referred to clinic for RBC abnormality and thrombocytopenia. Past medical history is significant for Crohn's disease on 6-MPH and PRINGLE.      Patient has had significant small bowel resection throughout her lifetime due to Crohn's disease and likely has underlying marrow suppression due to poor absorption, PRINGLE, and long-term 6-MP therapy. Her recent labs have returned showing copper deficiency. She has evidence of reticulocytosis on smear with undetectable haptoglobin. Patient continues on monthly B12 self-injections as prophylaxis. HIV and hepatitis studies are negative.     Patient does not have evidence of anemia and has improvement in macrocytosis. She continues to have decreased copper levels despite supplementation. Urine  copper returned normal.     Liver biopsy on 1/12/22 returned with steatohepatitis NAFLD score of 6 and cirrhosis, stage 4.     I discussed with the patient continued thrombocytopenia and coagulopathy are most likely due to underlying cirrhosis and possible splenic sequestration. Additionally, there is likely a component of bone marrow suppression. However, she continues to have evidence of elevated T. Bili, elevated LDH, and undetectable haptoglobin, which can be due to a combination of copper deficiency and underlying cirrhosis. We continue to discuss the possibility of a bone marrow biopsy to rule out an underlying marrow disorder, but will hold at this time.     Check ABD US to assess for splenomegaly. Repeat labs in 4 weeks.      Continue copper 4 mg daily. I have discussed with pharmacy and we have the ability of administer via infusion if necessary in the future.       Thank you for referring Mrs. Arita to clinic today. Adequate time was dedicated to patient questions and answered to the expressed satisfaction of the patient.      Destiny Powell DO  Hematology/Oncology  [facility]            Again, thank you for allowing me to participate in the care of your patient.        Sincerely,        Destiny Powell DO

## 2022-03-02 NOTE — NURSING NOTE
"Oncology Rooming Note    March 2, 2022 1:48 PM   Mercedes Arita is a 57 year old female who presents for:    Chief Complaint   Patient presents with     Oncology Clinic Visit     Anemia due to copper deficiency     Initial Vitals: /68 (Cuff Size: Adult Large)   Pulse 89   Temp 98  F (36.7  C) (Tympanic)   Resp 14   Ht 1.575 m (5' 2\")   Wt 71.7 kg (158 lb)   SpO2 98%   BMI 28.90 kg/m   Estimated body mass index is 28.9 kg/m  as calculated from the following:    Height as of this encounter: 1.575 m (5' 2\").    Weight as of this encounter: 71.7 kg (158 lb). Body surface area is 1.77 meters squared.  Data Unavailable Comment: Data Unavailable   No LMP recorded. Patient is postmenopausal.  Allergies reviewed: Yes  Medications reviewed: Yes    Medications: Medication refills not needed today.  Pharmacy name entered into [facility]: [id number] - [place], [place] - [address]    Clinical concerns: f/u       Leta Romano, SEBASTIEN              "

## 2022-03-03 NOTE — PATIENT INSTRUCTIONS
Mercedes is scheduled for the following:     - 03/25/22 8:00 am labs  - 03/25/22 8:30 abdomen ultrasound  - 04/04/22 1:00 pm return with Dr. Andre Razo RN on 3/3/2022 at 8:43 AM

## 2022-03-25 ENCOUNTER — HOSPITAL ENCOUNTER (OUTPATIENT)
Dept: ULTRASOUND IMAGING | Facility: CLINIC | Age: 57
Discharge: HOME | End: 2022-03-25
Attending: INTERNAL MEDICINE | Admitting: INTERNAL MEDICINE
Payer: COMMERCIAL

## 2022-03-25 ENCOUNTER — LAB (OUTPATIENT)
Dept: ONCOLOGY | Facility: CLINIC | Age: 57
End: 2022-03-25
Attending: INTERNAL MEDICINE
Payer: COMMERCIAL

## 2022-03-25 DIAGNOSIS — E61.0 COPPER DEFICIENCY: ICD-10-CM

## 2022-03-25 DIAGNOSIS — D69.6 THROMBOCYTOPENIA (H): ICD-10-CM

## 2022-03-25 LAB
ALBUMIN SERPL-MCNC: 3.2 G/DL (ref 3.4–5)
ALP SERPL-CCNC: 95 U/L (ref 40–150)
ALT SERPL W P-5'-P-CCNC: 42 U/L (ref 0–50)
ANION GAP SERPL CALCULATED.3IONS-SCNC: 4 MMOL/L (ref 3–14)
APTT PPP: 30 SECONDS (ref 22–38)
AST SERPL W P-5'-P-CCNC: 44 U/L (ref 0–45)
BASOPHILS # BLD AUTO: 0 10E3/UL (ref 0–0.2)
BASOPHILS # BLD AUTO: 0 10E3/UL (ref 0–0.2)
BASOPHILS NFR BLD AUTO: 1 %
BASOPHILS NFR BLD AUTO: 1 %
BILIRUB DIRECT SERPL-MCNC: 0.6 MG/DL (ref 0–0.2)
BILIRUB SERPL-MCNC: 1.9 MG/DL (ref 0.2–1.3)
BUN SERPL-MCNC: 10 MG/DL (ref 7–30)
CALCIUM SERPL-MCNC: 9.4 MG/DL (ref 8.5–10.1)
CHLORIDE BLD-SCNC: 109 MMOL/L (ref 94–109)
CO2 SERPL-SCNC: 24 MMOL/L (ref 20–32)
CREAT SERPL-MCNC: 0.58 MG/DL (ref 0.52–1.04)
DAT, ANTI-IGG, C3: NORMAL
EOSINOPHIL # BLD AUTO: 0.2 10E3/UL (ref 0–0.7)
EOSINOPHIL # BLD AUTO: 0.2 10E3/UL (ref 0–0.7)
EOSINOPHIL NFR BLD AUTO: 4 %
EOSINOPHIL NFR BLD AUTO: 4 %
ERYTHROCYTE [DISTWIDTH] IN BLOOD BY AUTOMATED COUNT: 13.9 % (ref 10–15)
ERYTHROCYTE [DISTWIDTH] IN BLOOD BY AUTOMATED COUNT: 14 % (ref 10–15)
FIBRINOGEN PPP-MCNC: 328 MG/DL (ref 170–490)
GFR SERPL CREATININE-BSD FRML MDRD: >90 ML/MIN/1.73M2
GLUCOSE BLD-MCNC: 85 MG/DL (ref 70–99)
HCT VFR BLD AUTO: 40.7 % (ref 35–47)
HCT VFR BLD AUTO: 41.3 % (ref 35–47)
HGB BLD-MCNC: 13 G/DL (ref 11.7–15.7)
HGB BLD-MCNC: 13.2 G/DL (ref 11.7–15.7)
IMM GRANULOCYTES # BLD: 0 10E3/UL
IMM GRANULOCYTES # BLD: 0 10E3/UL
IMM GRANULOCYTES NFR BLD: 0 %
IMM GRANULOCYTES NFR BLD: 0 %
INR PPP: 1.36 (ref 0.85–1.15)
LDH SERPL L TO P-CCNC: 270 U/L (ref 81–234)
LYMPHOCYTES # BLD AUTO: 1.2 10E3/UL (ref 0.8–5.3)
LYMPHOCYTES # BLD AUTO: 1.3 10E3/UL (ref 0.8–5.3)
LYMPHOCYTES NFR BLD AUTO: 28 %
LYMPHOCYTES NFR BLD AUTO: 29 %
MCH RBC QN AUTO: 31.9 PG (ref 26.5–33)
MCH RBC QN AUTO: 31.9 PG (ref 26.5–33)
MCHC RBC AUTO-ENTMCNC: 31.9 G/DL (ref 31.5–36.5)
MCHC RBC AUTO-ENTMCNC: 32 G/DL (ref 31.5–36.5)
MCV RBC AUTO: 100 FL (ref 78–100)
MCV RBC AUTO: 100 FL (ref 78–100)
MONOCYTES # BLD AUTO: 0.3 10E3/UL (ref 0–1.3)
MONOCYTES # BLD AUTO: 0.3 10E3/UL (ref 0–1.3)
MONOCYTES NFR BLD AUTO: 7 %
MONOCYTES NFR BLD AUTO: 8 %
NEUTROPHILS # BLD AUTO: 2.5 10E3/UL (ref 1.6–8.3)
NEUTROPHILS # BLD AUTO: 2.6 10E3/UL (ref 1.6–8.3)
NEUTROPHILS NFR BLD AUTO: 58 %
NEUTROPHILS NFR BLD AUTO: 60 %
NRBC # BLD AUTO: 0 10E3/UL
NRBC # BLD AUTO: 0 10E3/UL
NRBC BLD AUTO-RTO: 0 /100
NRBC BLD AUTO-RTO: 0 /100
PLATELET # BLD AUTO: 128 10E3/UL (ref 150–450)
PLATELET # BLD AUTO: 132 10E3/UL (ref 150–450)
POTASSIUM BLD-SCNC: 4.2 MMOL/L (ref 3.4–5.3)
PROT SERPL-MCNC: 8.3 G/DL (ref 6.8–8.8)
RBC # BLD AUTO: 4.08 10E6/UL (ref 3.8–5.2)
RBC # BLD AUTO: 4.14 10E6/UL (ref 3.8–5.2)
RETICS # AUTO: 0.14 10E6/UL (ref 0.03–0.1)
RETICS/RBC NFR AUTO: 3.5 % (ref 0.5–2)
SODIUM SERPL-SCNC: 137 MMOL/L (ref 133–144)
SPECIMEN EXPIRATION DATE: NORMAL
WBC # BLD AUTO: 4.3 10E3/UL (ref 4–11)
WBC # BLD AUTO: 4.3 10E3/UL (ref 4–11)

## 2022-03-25 PROCEDURE — 85610 PROTHROMBIN TIME: CPT | Performed by: INTERNAL MEDICINE

## 2022-03-25 PROCEDURE — 83615 LACTATE (LD) (LDH) ENZYME: CPT | Performed by: INTERNAL MEDICINE

## 2022-03-25 PROCEDURE — 83010 ASSAY OF HAPTOGLOBIN QUANT: CPT | Performed by: INTERNAL MEDICINE

## 2022-03-25 PROCEDURE — 85045 AUTOMATED RETICULOCYTE COUNT: CPT | Performed by: INTERNAL MEDICINE

## 2022-03-25 PROCEDURE — 85384 FIBRINOGEN ACTIVITY: CPT | Performed by: INTERNAL MEDICINE

## 2022-03-25 PROCEDURE — 36415 COLL VENOUS BLD VENIPUNCTURE: CPT

## 2022-03-25 PROCEDURE — 85730 THROMBOPLASTIN TIME PARTIAL: CPT | Performed by: INTERNAL MEDICINE

## 2022-03-25 PROCEDURE — 76705 ECHO EXAM OF ABDOMEN: CPT

## 2022-03-25 PROCEDURE — 82525 ASSAY OF COPPER: CPT | Performed by: INTERNAL MEDICINE

## 2022-03-25 PROCEDURE — 86880 COOMBS TEST DIRECT: CPT | Performed by: INTERNAL MEDICINE

## 2022-03-25 PROCEDURE — 80053 COMPREHEN METABOLIC PANEL: CPT | Performed by: INTERNAL MEDICINE

## 2022-03-25 PROCEDURE — 85025 COMPLETE CBC W/AUTO DIFF WBC: CPT | Performed by: INTERNAL MEDICINE

## 2022-03-25 PROCEDURE — 82248 BILIRUBIN DIRECT: CPT | Performed by: INTERNAL MEDICINE

## 2022-03-28 LAB
COPPER SERPL-MCNC: 65.7 UG/DL
HAPTOGLOB SERPL-MCNC: <3 MG/DL (ref 32–197)

## 2022-04-03 NOTE — PROGRESS NOTES
[facility]    Hematology/Oncology Established Patient Follow-up Note    Treatment Summary:      Today's Date: 4/4/22    Reason for Follow-up: RBC abnormality  Referring Provider: Dr. Chevy Fonseca      HISTORY OF PRESENT ILLNESS: Mercedes Arita is a 56 year old female who is referred to clinic for RBC abnormality and thrombocytopenia. Past medical history is significant for Crohn's disease on 6-MPH and PRINGLE.      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 She had her first small bowel resection in 1987. She has had a total of three small bowel resections in her lifetime with the most recent in 2012. She is followed by GI and is evaluated once yearly at this point. She has been on and off 6-MP therapy for the past 35 years. She has not required steroid pulses or hospitalization in decades. She was attempted once on remicade, but had severe non-anaphylactic reaction to this and was never trialed again on biologics. 6-MP has been held since September due to CBC.      Recently, she had an MRE performed due to persistent liver enzymes, which showed advancement in fibrosis. Her most recent colonoscopy was only a year ago without abnormality. No blood in stools, fatigue, unintentional weight loss. No personal or family history of malignancy.     In January 2021, WBC 4.4, Hb 12.6, , , AST 90, ALT 46, T. Bili 1.0, D. Bili 0.2, total protein 7.8, serum albumin 3.5.      In April 2021, WBC 4.1, Hb 12.2, , , AST 42, ALT 48, total protein 7.4, serum albumin 3.4, total bili 0.6, d. Bili 0.3.      In June 2021, WBC 4.6, Hb 12.2, , , AST 38, ALT 41, total protein 7.5, serum albumin 3.4, total bili 1.0, d. Bili 0.3.     In September 2021, WBC 4.6, Hb 11.9, , , AST 39, ALT 30, T. Bili 1.2, D. Bili 0.38.     On 10/5/21: WBC 5.4, Hb 12, , , normal diff.       INTERIM HISTORY:  No acute events since our last visit. Patient continues  on 4 mg copper daily. She is tolerating without issue and feels overall improved. No change in energy levels, unintentional weight loss, neuropathy.      REVIEW OF SYSTEMS:   A 14 point ROS was reviewed with pertinent positives and negatives in the HPI.       HOME MEDICATIONS:  Current Outpatient Medications   Medication Sig Dispense Refill     Copper 5 MG CAPS Take 1 capsule by mouth daily 30 capsule 1     cyanocobalamin 1000 MCG/ML injection Inject 1 mL as directed every 30 days.       mercaptopurine (PURINETHOL) 50 MG tablet Take 100 mg by mouth At Bedtime  (Patient not taking: Reported on 12/29/2021)       omeprazole (PRILOSEC) 20 MG DR capsule Take 20 mg by mouth daily           ALLERGIES:  Allergies   Allergen Reactions     Contrast Dye Other (See Comments)     Sneezed once. This was ten years ago and pt doesn't recall any other symptoms.         PAST MEDICAL HISTORY:  Past Medical History:   Diagnosis Date     Chronic pain     Abdominal pain     Crohn's     Chrons     Crohn's disease (H)     diagnosed 1983     PONV (postoperative nausea and vomiting)          PAST SURGICAL HISTORY:  Past Surgical History:   Procedure Laterality Date     CHOLECYSTECTOMY       DILATE RECTUM  9/19/2012    Procedure: DILATE RECTUM;  Exam Under Anesthesia, Dilation with Stricture, lysis of adhesions, Laparoscopic hand assisted Ileocolic Resection ;  Surgeon: Magy Macias MD;  Location:  OR     GASTROSCOPY N/A 1/18/2018    Procedure: GASTROSCOPY;  GASTROSCOPY WITH balloon DILATION of duodenal stricture up to 13.5mm UNDER FLUOROSCOPY (MAC SEDATION) ;  Surgeon: Aaliyah Langley MD;  Location:  OR     GI SURGERY  1987,1996,2012    bowel resection times 3 ileo-colonic resections     HERNIA REPAIR      Ventral times 2 with mesh.     LAPAROSCOPIC RESECTION ILEOCECAL  9/19/2012    Procedure: LAPAROSCOPIC RESECTION ILEOCECAL;;  Surgeon: Magy Macias MD;  Location:  OR     LAPAROTOMY EXPLORATORY      For  "removal of adhesions.     MOHS MICROGRAPHIC PROCEDURE      Removal of skin cancer on face.     ORTHOPEDIC SURGERY      carpal tunnel         SOCIAL HISTORY:  Social History     Socioeconomic History     Marital status: Single     Spouse name: Not on file     Number of children: Not on file     Years of education: Not on file     Highest education level: Not on file   Occupational History     Not on file   Tobacco Use     Smoking status: Never Smoker     Smokeless tobacco: Never Used   Substance and Sexual Activity     Alcohol use: Yes     Comment: 2 times yearly.     Drug use: No     Sexual activity: Yes     Partners: Male   Other Topics Concern     Parent/sibling w/ CABG, MI or angioplasty before 65F 55M? Not Asked   Social History Narrative     Not on file     Social Determinants of Health     Financial Resource Strain: Not on file   Food Insecurity: Not on file   Transportation Needs: Not on file   Physical Activity: Not on file   Stress: Not on file   Social Connections: Not on file   Intimate Partner Violence: Not At Risk     Fear of Current or Ex-Partner: No     Emotionally Abused: No     Physically Abused: No     Sexually Abused: No   Housing Stability: Not on file         FAMILY HISTORY:  No family history on file.      PHYSICAL EXAM:  Vital signs:  /69   Pulse 90   Temp 97.8  F (36.6  C) (Tympanic)   Resp 16   Ht 1.575 m (5' 2\")   Wt 71.1 kg (156 lb 11.2 oz)   SpO2 98%   BMI 28.66 kg/m     ECO  GENERAL/CONSTITUTIONAL: No acute distress.  EYES: Pupils are equal, round, and react to light and accommodation. Extraocular movements intact.  No scleral icterus.  ENT/MOUTH: Neck supple. Oropharynx clear, no mucositis.  LYMPH: No anterior cervical, posterior cervical, supraclavicular, axillary or inguinal adenopathy.   RESPIRATORY: Clear to auscultation bilaterally. No crackles or wheezing.   CARDIOVASCULAR: Regular rate and rhythm without murmurs, gallops, or rubs.  GASTROINTESTINAL: No " hepatosplenomegaly, masses, or tenderness. The patient has normal bowel sounds. No guarding.  No distention.  MUSCULOSKELETAL: Warm and well-perfused, no cyanosis, clubbing, or edema.  NEUROLOGIC: Cranial nerves II-XII are intact. Alert, oriented, answers questions appropriately.  INTEGUMENTARY: No rashes or jaundice.  GAIT: Steady, does not use assistive device      LABS:  CBC RESULTS:   Recent Labs   Lab Test 03/25/22  0806   WBC 4.3  4.3   RBC 4.08  4.14   HGB 13.0  13.2   HCT 40.7  41.3     100   MCH 31.9  31.9   MCHC 31.9  32.0   RDW 13.9  14.0   *  128*       Recent Labs   Lab Test 03/25/22  0806 02/23/22  1413    138   POTASSIUM 4.2 3.5   CHLORIDE 109 110*   CO2 24 24   ANIONGAP 4 4   GLC 85 107*   BUN 10 9   CR 0.58 0.58   TRAVIS 9.4 9.0         Ferritin Latest Ref Range: 8 - 252 ng/mL 62   Folate Latest Ref Range: >=5.4 ng/mL 25.4   Iron Latest Ref Range: 35 - 180 ug/dL 96   Iron Binding Cap Latest Ref Range: 240 - 430 ug/dL 385   Iron Saturation Index Latest Ref Range: 15 - 46 % 25   Lactate Dehydrogenase Latest Ref Range: 81 - 234 U/L 254 (H)   Vitamin B12 Latest Ref Range: 193 - 986 pg/mL 589           Ref. Range 12/27/2021 13:53   MAR  Broad Spectrum Latest Ref Range: Negative  NEG   Haptoglobin Latest Ref Range: 32 - 197 mg/dL <3 (L)           Ref. Range 11/12/2021 12:04   Copper Latest Ref Range: 80.0 - 155.0 ug/dL 72.3 (L)   Folate Latest Ref Range: >=5.4 ng/mL 35.1   Lactate Dehydrogenase Latest Ref Range: 81 - 234 U/L 277 (H)   Vitamin B12 Latest Ref Range: 193 - 986 pg/mL 655           Ref. Range 11/12/2021 12:04   INR Latest Ref Range: 0.85 - 1.15  1.30 (H)   PTT Latest Ref Range: 22 - 38 Seconds 36   Fibrinogen Latest Ref Range: 170 - 490 mg/dL 300           Ref. Range 11/12/2021 12:04   Haptoglobin Latest Ref Range: 32 - 197 mg/dL <3 (L)           Ref. Range 11/12/2021 12:04   Hep B Surface Agn Latest Ref Range: Nonreactive  Nonreactive   Hepatitis A Antibody IgG  Latest Ref Range: Nonreactive  Nonreactive   Hepatitis B Core Odilia Latest Ref Range: Nonreactive  Nonreactive   Hepatitis C Antibody Latest Ref Range: Nonreactive  Nonreactive   HIV Antigen Antibody Combo Latest Ref Range: Nonreactive  Nonreactive        Ref. Range 2/26/2022 09:43 2/26/2022 09:47   Copper Latest Ref Range: 80.0 - 155.0 ug/dL 62.0 (L)     Copper Random Urine Latest Ref Range: <=3.2 ug/dL   <1.0   Creatinine Urine/Volume Latest Units: mg/dL   44   Creatinine Urine/24hr Latest Ref Range: 500 - 1400 mg/d   770         PATHOLOGY:     Peripheral Smear 12/27/21:     The red cells appear normochromic.  Poikilocytosis is minimal. Polychromasia is not increased. Rouleaux formation is not increased. The morphology of the platelets is normal. There is no significant platelet clumping. Leukocytes are quantitatively normal and morphologically unremarkable.          Final Diagnosis 2/23/22:   Peripheral blood demonstrating thrombocytopenia with increased polychromasia             Final Diagnosis 1/12/22:   A. Liver, Biopsy:  - Steatohepatitis (NAFLD activity score = 6 of 8)  - Cirrhosis, stage 4.   Electronically signed by Zen Munoz MD PhD on 1/14/2022 at  2:25 PM   Clinical Information     History of fatty liver with biopsy 2013, now with MRI with concern for cirrhosis       US ABDOMEN LIMITED 3/25/2022:                                                       IMPRESSION: Normal appearance of the spleen. Normal size measuring 9.7  cm.      ASSESSMENT/PLAN:  Mercedes Arita is a 56 year old female who is referred to clinic for RBC abnormality and thrombocytopenia. Past medical history is significant for Crohn's disease on 6-MPH and PRINGLE.      Patient has had significant small bowel resection throughout her lifetime due to Crohn's disease and likely has underlying marrow suppression due to poor absorption, PRINGLE, and long-term 6-MP therapy. Her recent labs have returned showing copper deficiency. She has evidence  of reticulocytosis on smear with undetectable haptoglobin. Patient continues on monthly B12 self-injections as prophylaxis. HIV and hepatitis studies are negative.     Patient does not have evidence of anemia and has improvement in macrocytosis. She continues to have decreased copper levels despite supplementation. Urine copper returned normal.      Liver biopsy on 1/12/22 returned with steatohepatitis NAFLD score of 6 and cirrhosis, stage 4.     Abdominal U/S 3/25/22 negative for splenomegaly.     I discussed with the patient continued thrombocytopenia and coagulopathy are most likely due to underlying cirrhosis and possible splenic sequestration. Additionally, there is likely a component of bone marrow suppression. However, she continues to have evidence of elevated T. Bili, elevated LDH, and undetectable haptoglobin, which can be due to a combination of copper deficiency and underlying cirrhosis. We continue to discuss the possibility of a bone marrow biopsy to rule out an underlying marrow disorder, but will hold at this time.      Will stop copper supplementation at this time as this has not made significant difference.     Repeat labs in 6 months with follow up then.         Thank you for referring Mrs. Arita to clinic today. Adequate time was dedicated to patient questions and answered to the expressed satisfaction of the patient.      Destiny Powell,   Hematology/Oncology  [facility]

## 2022-04-04 ENCOUNTER — ONCOLOGY VISIT (OUTPATIENT)
Dept: ONCOLOGY | Facility: CLINIC | Age: 57
End: 2022-04-04
Attending: INTERNAL MEDICINE
Payer: COMMERCIAL

## 2022-04-04 VITALS
HEIGHT: 62 IN | DIASTOLIC BLOOD PRESSURE: 69 MMHG | SYSTOLIC BLOOD PRESSURE: 127 MMHG | TEMPERATURE: 97.8 F | HEART RATE: 90 BPM | BODY MASS INDEX: 28.84 KG/M2 | RESPIRATION RATE: 16 BRPM | WEIGHT: 156.7 LBS | OXYGEN SATURATION: 98 %

## 2022-04-04 DIAGNOSIS — D69.6 THROMBOCYTOPENIA (H): ICD-10-CM

## 2022-04-04 DIAGNOSIS — E61.0 COPPER DEFICIENCY: ICD-10-CM

## 2022-04-04 PROCEDURE — 99214 OFFICE O/P EST MOD 30 MIN: CPT | Performed by: INTERNAL MEDICINE

## 2022-04-04 PROCEDURE — G0463 HOSPITAL OUTPT CLINIC VISIT: HCPCS

## 2022-04-04 ASSESSMENT — PAIN SCALES - GENERAL: PAINLEVEL: NO PAIN (0)

## 2022-04-04 NOTE — PATIENT INSTRUCTIONS
-Labs and ultrasound reviewed. Platelets stable.  -Repeat labs in 6 months time with follow up in clinic with Dr. Powell shortly after.

## 2022-04-04 NOTE — LETTER
4/4/2022         RE: Mercedes Arita  [address] [address] [facility] [address]        Dear Colleague,    Thank you for referring your patient, Mercedes Arita, to the [facility]. Please see a copy of my visit note below.    [facility]    Hematology/Oncology Established Patient Follow-up Note    Treatment Summary:      Today's Date: 4/4/22    Reason for Follow-up: RBC abnormality  Referring Provider: Dr. Chevy Fonseca      HISTORY OF PRESENT ILLNESS: Mercedes Arita is a 56 year old female who is referred to clinic for RBC abnormality and thrombocytopenia. Past medical history is significant for Crohn's disease on 6-MPH and PRINGLE.      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 She had her first small bowel resection in 1987. She has had a total of three small bowel resections in her lifetime with the most recent in 2012. She is followed by GI and is evaluated once yearly at this point. She has been on and off 6-MP therapy for the past 35 years. She has not required steroid pulses or hospitalization in decades. She was attempted once on remicade, but had severe non-anaphylactic reaction to this and was never trialed again on biologics. 6-MP has been held since September due to CBC.      Recently, she had an MRE performed due to persistent liver enzymes, which showed advancement in fibrosis. Her most recent colonoscopy was only a year ago without abnormality. No blood in stools, fatigue, unintentional weight loss. No personal or family history of malignancy.     In January 2021, WBC 4.4, Hb 12.6, , , AST 90, ALT 46, T. Bili 1.0, D. Bili 0.2, total protein 7.8, serum albumin 3.5.      In April 2021, WBC 4.1, Hb 12.2, , , AST 42, ALT 48, total protein 7.4, serum albumin 3.4, total bili 0.6, d. Bili 0.3.      In June 2021, WBC 4.6, Hb 12.2, , , AST 38, ALT 41, total protein 7.5, serum albumin 3.4, total bili 1.0, d.  Bili 0.3.     In September 2021, WBC 4.6, Hb 11.9, , , AST 39, ALT 30, T. Bili 1.2, D. Bili 0.38.     On 10/5/21: WBC 5.4, Hb 12, , , normal diff.       INTERIM HISTORY:  No acute events since our last visit. Patient continues on 4 mg copper daily. She is tolerating without issue and feels overall improved. No change in energy levels, unintentional weight loss, neuropathy.      REVIEW OF SYSTEMS:   A 14 point ROS was reviewed with pertinent positives and negatives in the HPI.       HOME MEDICATIONS:  Current Outpatient Medications   Medication Sig Dispense Refill     Copper 5 MG CAPS Take 1 capsule by mouth daily 30 capsule 1     cyanocobalamin 1000 MCG/ML injection Inject 1 mL as directed every 30 days.       mercaptopurine (PURINETHOL) 50 MG tablet Take 100 mg by mouth At Bedtime  (Patient not taking: Reported on 12/29/2021)       omeprazole (PRILOSEC) 20 MG DR capsule Take 20 mg by mouth daily           ALLERGIES:  Allergies   Allergen Reactions     Contrast Dye Other (See Comments)     Sneezed once. This was ten years ago and pt doesn't recall any other symptoms.         PAST MEDICAL HISTORY:  Past Medical History:   Diagnosis Date     Chronic pain     Abdominal pain     Crohn's     Chrons     Crohn's disease (H)     diagnosed 1983     PONV (postoperative nausea and vomiting)          PAST SURGICAL HISTORY:  Past Surgical History:   Procedure Laterality Date     CHOLECYSTECTOMY       DILATE RECTUM  9/19/2012    Procedure: DILATE RECTUM;  Exam Under Anesthesia, Dilation with Stricture, lysis of adhesions, Laparoscopic hand assisted Ileocolic Resection ;  Surgeon: Magy Macias MD;  Location:  OR     GASTROSCOPY N/A 1/18/2018    Procedure: GASTROSCOPY;  GASTROSCOPY WITH balloon DILATION of duodenal stricture up to 13.5mm UNDER FLUOROSCOPY (MAC SEDATION) ;  Surgeon: Aaliyah Langley MD;  Location:  OR     GI SURGERY  1987,1996,2012    bowel resection times 3  "ileo-colonic resections     HERNIA REPAIR      Ventral times 2 with mesh.     LAPAROSCOPIC RESECTION ILEOCECAL  2012    Procedure: LAPAROSCOPIC RESECTION ILEOCECAL;;  Surgeon: Magy Macias MD;  Location: [facility] OR     LAPAROTOMY EXPLORATORY      For removal of adhesions.     MOHS MICROGRAPHIC PROCEDURE      Removal of skin cancer on face.     ORTHOPEDIC SURGERY      carpal tunnel         SOCIAL HISTORY:  Social History     Socioeconomic History     Marital status: Single     Spouse name: Not on file     Number of children: Not on file     Years of education: Not on file     Highest education level: Not on file   Occupational History     Not on file   Tobacco Use     Smoking status: Never Smoker     Smokeless tobacco: Never Used   Substance and Sexual Activity     Alcohol use: Yes     Comment: 2 times yearly.     Drug use: No     Sexual activity: Yes     Partners: Male   Other Topics Concern     Parent/sibling w/ CABG, MI or angioplasty before 65F 55M? Not Asked   Social History Narrative     Not on file     Social Determinants of Health     Financial Resource Strain: Not on file   Food Insecurity: Not on file   Transportation Needs: Not on file   Physical Activity: Not on file   Stress: Not on file   Social Connections: Not on file   Intimate Partner Violence: Not At Risk     Fear of Current or Ex-Partner: No     Emotionally Abused: No     Physically Abused: No     Sexually Abused: No   Housing Stability: Not on file         FAMILY HISTORY:  No family history on file.      PHYSICAL EXAM:  Vital signs:  /69   Pulse 90   Temp 97.8  F (36.6  C) (Tympanic)   Resp 16   Ht 1.575 m (5' 2\")   Wt 71.1 kg (156 lb 11.2 oz)   SpO2 98%   BMI 28.66 kg/m     ECO  GENERAL/CONSTITUTIONAL: No acute distress.  EYES: Pupils are equal, round, and react to light and accommodation. Extraocular movements intact.  No scleral icterus.  ENT/MOUTH: Neck supple. Oropharynx clear, no mucositis.  LYMPH: No anterior " cervical, posterior cervical, supraclavicular, axillary or inguinal adenopathy.   RESPIRATORY: Clear to auscultation bilaterally. No crackles or wheezing.   CARDIOVASCULAR: Regular rate and rhythm without murmurs, gallops, or rubs.  GASTROINTESTINAL: No hepatosplenomegaly, masses, or tenderness. The patient has normal bowel sounds. No guarding.  No distention.  MUSCULOSKELETAL: Warm and well-perfused, no cyanosis, clubbing, or edema.  NEUROLOGIC: Cranial nerves II-XII are intact. Alert, oriented, answers questions appropriately.  INTEGUMENTARY: No rashes or jaundice.  GAIT: Steady, does not use assistive device      LABS:  CBC RESULTS:   Recent Labs   Lab Test 03/25/22  0806   WBC 4.3  4.3   RBC 4.08  4.14   HGB 13.0  13.2   HCT 40.7  41.3     100   MCH 31.9  31.9   MCHC 31.9  32.0   RDW 13.9  14.0   *  128*       Recent Labs   Lab Test 03/25/22  0806 02/23/22  1413    138   POTASSIUM 4.2 3.5   CHLORIDE 109 110*   CO2 24 24   ANIONGAP 4 4   GLC 85 107*   BUN 10 9   CR 0.58 0.58   TRAVIS 9.4 9.0         Ferritin Latest Ref Range: 8 - 252 ng/mL 62   Folate Latest Ref Range: >=5.4 ng/mL 25.4   Iron Latest Ref Range: 35 - 180 ug/dL 96   Iron Binding Cap Latest Ref Range: 240 - 430 ug/dL 385   Iron Saturation Index Latest Ref Range: 15 - 46 % 25   Lactate Dehydrogenase Latest Ref Range: 81 - 234 U/L 254 (H)   Vitamin B12 Latest Ref Range: 193 - 986 pg/mL 589           Ref. Range 12/27/2021 13:53   MAR  Broad Spectrum Latest Ref Range: Negative  NEG   Haptoglobin Latest Ref Range: 32 - 197 mg/dL <3 (L)           Ref. Range 11/12/2021 12:04   Copper Latest Ref Range: 80.0 - 155.0 ug/dL 72.3 (L)   Folate Latest Ref Range: >=5.4 ng/mL 35.1   Lactate Dehydrogenase Latest Ref Range: 81 - 234 U/L 277 (H)   Vitamin B12 Latest Ref Range: 193 - 986 pg/mL 655           Ref. Range 11/12/2021 12:04   INR Latest Ref Range: 0.85 - 1.15  1.30 (H)   PTT Latest Ref Range: 22 - 38 Seconds 36   Fibrinogen Latest  Ref Range: 170 - 490 mg/dL 300           Ref. Range 11/12/2021 12:04   Haptoglobin Latest Ref Range: 32 - 197 mg/dL <3 (L)           Ref. Range 11/12/2021 12:04   Hep B Surface Agn Latest Ref Range: Nonreactive  Nonreactive   Hepatitis A Antibody IgG Latest Ref Range: Nonreactive  Nonreactive   Hepatitis B Core Odilia Latest Ref Range: Nonreactive  Nonreactive   Hepatitis C Antibody Latest Ref Range: Nonreactive  Nonreactive   HIV Antigen Antibody Combo Latest Ref Range: Nonreactive  Nonreactive        Ref. Range 2/26/2022 09:43 2/26/2022 09:47   Copper Latest Ref Range: 80.0 - 155.0 ug/dL 62.0 (L)     Copper Random Urine Latest Ref Range: <=3.2 ug/dL   <1.0   Creatinine Urine/Volume Latest Units: mg/dL   44   Creatinine Urine/24hr Latest Ref Range: 500 - 1400 mg/d   770         PATHOLOGY:     Peripheral Smear 12/27/21:     The red cells appear normochromic.  Poikilocytosis is minimal. Polychromasia is not increased. Rouleaux formation is not increased. The morphology of the platelets is normal. There is no significant platelet clumping. Leukocytes are quantitatively normal and morphologically unremarkable.          Final Diagnosis 2/23/22:   Peripheral blood demonstrating thrombocytopenia with increased polychromasia             Final Diagnosis 1/12/22:   A. Liver, Biopsy:  - Steatohepatitis (NAFLD activity score = 6 of 8)  - Cirrhosis, stage 4.   Electronically signed by Zen Munoz MD PhD on 1/14/2022 at  2:25 PM   Clinical Information     History of fatty liver with biopsy 2013, now with MRI with concern for cirrhosis       US ABDOMEN LIMITED 3/25/2022:                                                       IMPRESSION: Normal appearance of the spleen. Normal size measuring 9.7  cm.      ASSESSMENT/PLAN:  Mercedes Arita is a 56 year old female who is referred to clinic for RBC abnormality and thrombocytopenia. Past medical history is significant for Crohn's disease on 6-MPH and PRINGLE.      Patient has had  significant small bowel resection throughout her lifetime due to Crohn's disease and likely has underlying marrow suppression due to poor absorption, PRINGLE, and long-term 6-MP therapy. Her recent labs have returned showing copper deficiency. She has evidence of reticulocytosis on smear with undetectable haptoglobin. Patient continues on monthly B12 self-injections as prophylaxis. HIV and hepatitis studies are negative.     Patient does not have evidence of anemia and has improvement in macrocytosis. She continues to have decreased copper levels despite supplementation. Urine copper returned normal.      Liver biopsy on 1/12/22 returned with steatohepatitis NAFLD score of 6 and cirrhosis, stage 4.     Abdominal U/S 3/25/22 negative for splenomegaly.     I discussed with the patient continued thrombocytopenia and coagulopathy are most likely due to underlying cirrhosis and possible splenic sequestration. Additionally, there is likely a component of bone marrow suppression. However, she continues to have evidence of elevated T. Bili, elevated LDH, and undetectable haptoglobin, which can be due to a combination of copper deficiency and underlying cirrhosis. We continue to discuss the possibility of a bone marrow biopsy to rule out an underlying marrow disorder, but will hold at this time.      Will stop copper supplementation at this time as this has not made significant difference.     Repeat labs in 6 months with follow up then.         Thank you for referring Mrs. Arita to clinic today. Adequate time was dedicated to patient questions and answered to the expressed satisfaction of the patient.      Destiny Powell DO  Hematology/Oncology  [facility]              Again, thank you for allowing me to participate in the care of your patient.        Sincerely,        Destiny Powell DO

## 2022-04-04 NOTE — NURSING NOTE
"Oncology Rooming Note    April 4, 2022 1:05 PM   Mercedes Airta is a 57 year old female who presents for:    Chief Complaint   Patient presents with     Oncology Clinic Visit     Thrombocytopenia     Initial Vitals: /69   Pulse 90   Temp 97.8  F (36.6  C) (Tympanic)   Resp 16   Ht 1.575 m (5' 2\")   Wt 71.1 kg (156 lb 11.2 oz)   SpO2 98%   BMI 28.66 kg/m   Estimated body mass index is 28.66 kg/m  as calculated from the following:    Height as of this encounter: 1.575 m (5' 2\").    Weight as of this encounter: 71.1 kg (156 lb 11.2 oz). Body surface area is 1.76 meters squared.  No Pain (0) Comment: Data Unavailable   No LMP recorded. Patient is postmenopausal.  Allergies reviewed: Yes  Medications reviewed: Yes    Medications: Medication refills not needed today.  Pharmacy name entered into [employer]: [id number] [place], [place] - [id number]    Clinical concerns: follow up        Genny Hernandez CMA              "

## 2022-05-11 ENCOUNTER — HOSPITAL ENCOUNTER (OUTPATIENT)
Dept: ULTRASOUND IMAGING | Facility: CLINIC | Age: 57
Discharge: HOME | End: 2022-05-11
Attending: INTERNAL MEDICINE | Admitting: INTERNAL MEDICINE
Payer: COMMERCIAL

## 2022-05-11 DIAGNOSIS — K75.81 NASH (NONALCOHOLIC STEATOHEPATITIS): ICD-10-CM

## 2022-05-11 DIAGNOSIS — K74.60 CIRRHOSIS OF LIVER WITHOUT ASCITES, UNSPECIFIED HEPATIC CIRRHOSIS TYPE (H): ICD-10-CM

## 2022-05-11 PROCEDURE — 76705 ECHO EXAM OF ABDOMEN: CPT

## 2022-06-28 ENCOUNTER — TRANSFERRED RECORDS (OUTPATIENT)
Dept: HEALTH INFORMATION MANAGEMENT | Facility: CLINIC | Age: 57
End: 2022-06-28

## 2022-06-29 ENCOUNTER — TRANSFERRED RECORDS (OUTPATIENT)
Dept: HEALTH INFORMATION MANAGEMENT | Facility: CLINIC | Age: 57
End: 2022-06-29

## 2022-07-27 ENCOUNTER — HOSPITAL ENCOUNTER (OUTPATIENT)
Dept: MRI IMAGING | Facility: CLINIC | Age: 57
Discharge: HOME | End: 2022-07-27
Attending: INTERNAL MEDICINE | Admitting: INTERNAL MEDICINE
Payer: COMMERCIAL

## 2022-07-27 DIAGNOSIS — K50.00 CROHN'S DISEASE OF SMALL INTESTINE WITHOUT COMPLICATION (H): ICD-10-CM

## 2022-07-27 PROCEDURE — 74183 MRI ABD W/O CNTR FLWD CNTR: CPT

## 2022-07-27 PROCEDURE — A9585 GADOBUTROL INJECTION: HCPCS | Performed by: INTERNAL MEDICINE

## 2022-07-27 PROCEDURE — 255N000002 HC RX 255 OP 636: Performed by: INTERNAL MEDICINE

## 2022-07-27 PROCEDURE — 250N000011 HC RX IP 250 OP 636: Performed by: INTERNAL MEDICINE

## 2022-07-27 RX ORDER — GADOBUTROL 604.72 MG/ML
10 INJECTION INTRAVENOUS ONCE
Status: COMPLETED | OUTPATIENT
Start: 2022-07-27 | End: 2022-07-27

## 2022-07-27 RX ADMIN — GLUCAGON HYDROCHLORIDE 1 MG: KIT at 10:36

## 2022-07-27 RX ADMIN — GADOBUTROL 10 ML: 604.72 INJECTION INTRAVENOUS at 09:50

## 2022-07-28 ENCOUNTER — TRANSFERRED RECORDS (OUTPATIENT)
Dept: HEALTH INFORMATION MANAGEMENT | Facility: CLINIC | Age: 57
End: 2022-07-28

## 2022-08-25 ENCOUNTER — TRANSFERRED RECORDS (OUTPATIENT)
Dept: HEALTH INFORMATION MANAGEMENT | Facility: CLINIC | Age: 57
End: 2022-08-25

## 2022-09-28 ENCOUNTER — LAB (OUTPATIENT)
Dept: ONCOLOGY | Facility: CLINIC | Age: 57
End: 2022-09-28
Attending: INTERNAL MEDICINE
Payer: COMMERCIAL

## 2022-09-28 DIAGNOSIS — E61.0 COPPER DEFICIENCY: ICD-10-CM

## 2022-09-28 DIAGNOSIS — D69.6 THROMBOCYTOPENIA (H): ICD-10-CM

## 2022-09-28 LAB
ALBUMIN SERPL BCG-MCNC: 3.6 G/DL (ref 3.5–5.2)
ALP SERPL-CCNC: 119 U/L (ref 35–104)
ALT SERPL W P-5'-P-CCNC: 34 U/L (ref 10–35)
ANION GAP SERPL CALCULATED.3IONS-SCNC: 12 MMOL/L (ref 7–15)
AST SERPL W P-5'-P-CCNC: 44 U/L (ref 10–35)
BASOPHILS # BLD AUTO: 0.1 10E3/UL (ref 0–0.2)
BASOPHILS NFR BLD AUTO: 1 %
BILIRUB SERPL-MCNC: 1.4 MG/DL
BUN SERPL-MCNC: 7.7 MG/DL (ref 6–20)
CALCIUM SERPL-MCNC: 9.1 MG/DL (ref 8.6–10)
CHLORIDE SERPL-SCNC: 108 MMOL/L (ref 98–107)
CREAT SERPL-MCNC: 0.63 MG/DL (ref 0.51–0.95)
DEPRECATED HCO3 PLAS-SCNC: 21 MMOL/L (ref 22–29)
EOSINOPHIL # BLD AUTO: 0.3 10E3/UL (ref 0–0.7)
EOSINOPHIL NFR BLD AUTO: 4 %
ERYTHROCYTE [DISTWIDTH] IN BLOOD BY AUTOMATED COUNT: 14.6 % (ref 10–15)
GFR SERPL CREATININE-BSD FRML MDRD: >90 ML/MIN/1.73M2
GLUCOSE SERPL-MCNC: 90 MG/DL (ref 70–99)
HCT VFR BLD AUTO: 38.9 % (ref 35–47)
HGB BLD-MCNC: 12.2 G/DL (ref 11.7–15.7)
IMM GRANULOCYTES # BLD: 0 10E3/UL
IMM GRANULOCYTES NFR BLD: 0 %
LYMPHOCYTES # BLD AUTO: 2 10E3/UL (ref 0.8–5.3)
LYMPHOCYTES NFR BLD AUTO: 34 %
MCH RBC QN AUTO: 31.5 PG (ref 26.5–33)
MCHC RBC AUTO-ENTMCNC: 31.4 G/DL (ref 31.5–36.5)
MCV RBC AUTO: 101 FL (ref 78–100)
MONOCYTES # BLD AUTO: 0.6 10E3/UL (ref 0–1.3)
MONOCYTES NFR BLD AUTO: 11 %
NEUTROPHILS # BLD AUTO: 2.9 10E3/UL (ref 1.6–8.3)
NEUTROPHILS NFR BLD AUTO: 50 %
NRBC # BLD AUTO: 0 10E3/UL
NRBC BLD AUTO-RTO: 0 /100
PLATELET # BLD AUTO: 132 10E3/UL (ref 150–450)
POTASSIUM SERPL-SCNC: 4 MMOL/L (ref 3.4–5.3)
PROT SERPL-MCNC: 7.7 G/DL (ref 6.4–8.3)
RBC # BLD AUTO: 3.87 10E6/UL (ref 3.8–5.2)
RETICS # AUTO: 0.1 10E6/UL (ref 0.03–0.1)
RETICS/RBC NFR AUTO: 2.6 % (ref 0.5–2)
SODIUM SERPL-SCNC: 141 MMOL/L (ref 136–145)
WBC # BLD AUTO: 5.9 10E3/UL (ref 4–11)

## 2022-09-28 PROCEDURE — 85045 AUTOMATED RETICULOCYTE COUNT: CPT | Performed by: INTERNAL MEDICINE

## 2022-09-28 PROCEDURE — 80053 COMPREHEN METABOLIC PANEL: CPT | Performed by: INTERNAL MEDICINE

## 2022-09-28 PROCEDURE — 36415 COLL VENOUS BLD VENIPUNCTURE: CPT

## 2022-09-28 PROCEDURE — 85025 COMPLETE CBC W/AUTO DIFF WBC: CPT | Performed by: INTERNAL MEDICINE

## 2022-09-30 LAB
PATH REPORT.COMMENTS IMP SPEC: NORMAL
PATH REPORT.FINAL DX SPEC: NORMAL
PATH REPORT.MICROSCOPIC SPEC OTHER STN: NORMAL
PATH REPORT.MICROSCOPIC SPEC OTHER STN: NORMAL

## 2022-10-03 NOTE — PROGRESS NOTES
[facility]    Hematology/Oncology Established Patient Follow-up Note    Treatment Summary:      Today's Date: 10/4/22    Reason for Follow-up: RBC abnormality  Referring Provider: Dr. Chevy Fonseca      HISTORY OF PRESENT ILLNESS: Mercedes Arita is a 57 year old female who is referred to clinic for RBC abnormality and thrombocytopenia. Past medical history is significant for Crohn's disease on 6-MPH and PRINGLE.      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 She had her first small bowel resection in 1987. She has had a total of three small bowel resections in her lifetime with the most recent in 2012. She is followed by GI and is evaluated once yearly at this point. She has been on and off 6-MP therapy for the past 35 years. She has not required steroid pulses or hospitalization in decades. She was attempted once on remicade, but had severe non-anaphylactic reaction to this and was never trialed again on biologics. 6-MP has been held since September due to CBC.      Recently, she had an MRE performed due to persistent liver enzymes, which showed advancement in fibrosis. Her most recent colonoscopy was only a year ago without abnormality. No blood in stools, fatigue, unintentional weight loss. No personal or family history of malignancy.     In January 2021, WBC 4.4, Hb 12.6, , , AST 90, ALT 46, T. Bili 1.0, D. Bili 0.2, total protein 7.8, serum albumin 3.5.      In April 2021, WBC 4.1, Hb 12.2, , , AST 42, ALT 48, total protein 7.4, serum albumin 3.4, total bili 0.6, d. Bili 0.3.      In June 2021, WBC 4.6, Hb 12.2, , , AST 38, ALT 41, total protein 7.5, serum albumin 3.4, total bili 1.0, d. Bili 0.3.     In September 2021, WBC 4.6, Hb 11.9, , , AST 39, ALT 30, T. Bili 1.2, D. Bili 0.38.     On 10/5/21: WBC 5.4, Hb 12, , , normal diff.       INTERIM HISTORY:  No acute events since our last visit. She has  discontinued copper supplementation. No change in energy levels, unintentional weight loss, neuropathy.      REVIEW OF SYSTEMS:   A 14 point ROS was reviewed with pertinent positives and negatives in the HPI.       HOME MEDICATIONS:  Current Outpatient Medications   Medication Sig Dispense Refill     Copper 5 MG CAPS Take 1 capsule by mouth daily (Patient taking differently: Take 1 capsule by mouth every other day ) 30 capsule 1     cyanocobalamin 1000 MCG/ML injection Inject 1 mL as directed every 30 days.       mercaptopurine (PURINETHOL) 50 MG tablet Take 100 mg by mouth At Bedtime  (Patient not taking: Reported on 12/29/2021)       omeprazole (PRILOSEC) 20 MG DR capsule Take 20 mg by mouth daily           ALLERGIES:  Allergies   Allergen Reactions     Contrast Dye Other (See Comments)     Sneezed once. This was ten years ago and pt doesn't recall any other symptoms.         PAST MEDICAL HISTORY:  Past Medical History:   Diagnosis Date     Chronic pain     Abdominal pain     Crohn's     Chrons     Crohn's disease (H)     diagnosed 1983     PONV (postoperative nausea and vomiting)          PAST SURGICAL HISTORY:  Past Surgical History:   Procedure Laterality Date     CHOLECYSTECTOMY       DILATE RECTUM  9/19/2012    Procedure: DILATE RECTUM;  Exam Under Anesthesia, Dilation with Stricture, lysis of adhesions, Laparoscopic hand assisted Ileocolic Resection ;  Surgeon: Magy Macias MD;  Location:  OR     GASTROSCOPY N/A 1/18/2018    Procedure: GASTROSCOPY;  GASTROSCOPY WITH balloon DILATION of duodenal stricture up to 13.5mm UNDER FLUOROSCOPY (MAC SEDATION) ;  Surgeon: Aaliyah Langley MD;  Location:  OR     GI SURGERY  1987,1996,2012    bowel resection times 3 ileo-colonic resections     HERNIA REPAIR      Ventral times 2 with mesh.     LAPAROSCOPIC RESECTION ILEOCECAL  9/19/2012    Procedure: LAPAROSCOPIC RESECTION ILEOCECAL;;  Surgeon: Magy Macias MD;  Location:  OR     LAPAROTOMY  "EXPLORATORY      For removal of adhesions.     MOHS MICROGRAPHIC PROCEDURE      Removal of skin cancer on face.     ORTHOPEDIC SURGERY      carpal tunnel         SOCIAL HISTORY:  Social History     Socioeconomic History     Marital status: Single     Spouse name: Not on file     Number of children: Not on file     Years of education: Not on file     Highest education level: Not on file   Occupational History     Not on file   Tobacco Use     Smoking status: Never Smoker     Smokeless tobacco: Never Used   Substance and Sexual Activity     Alcohol use: Yes     Comment: 2 times yearly.     Drug use: No     Sexual activity: Yes     Partners: Male   Other Topics Concern     Parent/sibling w/ CABG, MI or angioplasty before 65F 55M? Not Asked   Social History Narrative     Not on file     Social Determinants of Health     Financial Resource Strain: Not on file   Food Insecurity: Not on file   Transportation Needs: Not on file   Physical Activity: Not on file   Stress: Not on file   Social Connections: Not on file   Intimate Partner Violence: Not At Risk     Fear of Current or Ex-Partner: No     Emotionally Abused: No     Physically Abused: No     Sexually Abused: No   Housing Stability: Not on file         FAMILY HISTORY:  No family history on file.      PHYSICAL EXAM:  Vital signs:  /75   Pulse 101   Temp 97.2  F (36.2  C) (Tympanic)   Resp 16   Ht 1.575 m (5' 2\")   Wt 70.7 kg (155 lb 14.4 oz)   SpO2 97%   BMI 28.51 kg/m     ECO  GENERAL/CONSTITUTIONAL: No acute distress.  EYES: Pupils are equal, round, and react to light and accommodation. Extraocular movements intact.  No scleral icterus.  ENT/MOUTH: Neck supple. Oropharynx clear, no mucositis.  LYMPH: No anterior cervical, posterior cervical, supraclavicular, axillary or inguinal adenopathy.   RESPIRATORY: Clear to auscultation bilaterally. No crackles or wheezing.   CARDIOVASCULAR: Regular rate and rhythm without murmurs, gallops, or " rubs.  GASTROINTESTINAL: No hepatosplenomegaly, masses, or tenderness. The patient has normal bowel sounds. No guarding.  No distention.  MUSCULOSKELETAL: Warm and well-perfused, no cyanosis, clubbing, or edema.  NEUROLOGIC: Cranial nerves II-XII are intact. Alert, oriented, answers questions appropriately.  INTEGUMENTARY: No rashes or jaundice.  GAIT: Steady, does not use assistive device.      LABS:   Latest Reference Range & Units 09/28/22 14:52   Sodium 136 - 145 mmol/L 141   Potassium 3.4 - 5.3 mmol/L 4.0   Chloride 98 - 107 mmol/L 108 (H)   Carbon Dioxide (CO2) 22 - 29 mmol/L 21 (L)   Urea Nitrogen 6.0 - 20.0 mg/dL 7.7   Creatinine 0.51 - 0.95 mg/dL 0.63   GFR Estimate >60 mL/min/1.73m2 >90   Calcium 8.6 - 10.0 mg/dL 9.1   Anion Gap 7 - 15 mmol/L 12   Albumin 3.5 - 5.2 g/dL 3.6   Protein Total 6.4 - 8.3 g/dL 7.7   Alkaline Phosphatase 35 - 104 U/L 119 (H)   ALT 10 - 35 U/L 34   AST 10 - 35 U/L 44 (H)   Bilirubin Total <=1.2 mg/dL 1.4 (H)   Glucose 70 - 99 mg/dL 90   WBC 4.0 - 11.0 10e3/uL 5.9   Hemoglobin 11.7 - 15.7 g/dL 12.2   Hematocrit 35.0 - 47.0 % 38.9   Platelet Count 150 - 450 10e3/uL 132 (L)   RBC Count 3.80 - 5.20 10e6/uL 3.87   MCV 78 - 100 fL 101 (H)   MCH 26.5 - 33.0 pg 31.5   MCHC 31.5 - 36.5 g/dL 31.4 (L)   RDW 10.0 - 15.0 % 14.6   % Neutrophils % 50   % Lymphocytes % 34   % Monocytes % 11   % Eosinophils % 4   % Basophils % 1   Absolute Basophils 0.0 - 0.2 10e3/uL 0.1   Absolute Eosinophils 0.0 - 0.7 10e3/uL 0.3   Absolute Immature Granulocytes <=0.4 10e3/uL 0.0   Absolute Lymphocytes 0.8 - 5.3 10e3/uL 2.0   Absolute Monocytes 0.0 - 1.3 10e3/uL 0.6   % Immature Granulocytes % 0   Absolute Neutrophils 1.6 - 8.3 10e3/uL 2.9   Absolute NRBCs 10e3/uL 0.0   NRBCs per 100 WBC <1 /100 0   % Retic 0.5 - 2.0 % 2.6 (H)   Absolute Retic 0.025 - 0.095 10e6/uL 0.101 (H)           Ferritin Latest Ref Range: 8 - 252 ng/mL 62   Folate Latest Ref Range: >=5.4 ng/mL 25.4   Iron Latest Ref Range: 35 - 180 ug/dL  96   Iron Binding Cap Latest Ref Range: 240 - 430 ug/dL 385   Iron Saturation Index Latest Ref Range: 15 - 46 % 25   Lactate Dehydrogenase Latest Ref Range: 81 - 234 U/L 254 (H)   Vitamin B12 Latest Ref Range: 193 - 986 pg/mL 589           Ref. Range 12/27/2021 13:53   MAR  Broad Spectrum Latest Ref Range: Negative  NEG   Haptoglobin Latest Ref Range: 32 - 197 mg/dL <3 (L)           Ref. Range 11/12/2021 12:04   Copper Latest Ref Range: 80.0 - 155.0 ug/dL 72.3 (L)   Folate Latest Ref Range: >=5.4 ng/mL 35.1   Lactate Dehydrogenase Latest Ref Range: 81 - 234 U/L 277 (H)   Vitamin B12 Latest Ref Range: 193 - 986 pg/mL 655           Ref. Range 11/12/2021 12:04   INR Latest Ref Range: 0.85 - 1.15  1.30 (H)   PTT Latest Ref Range: 22 - 38 Seconds 36   Fibrinogen Latest Ref Range: 170 - 490 mg/dL 300           Ref. Range 11/12/2021 12:04   Haptoglobin Latest Ref Range: 32 - 197 mg/dL <3 (L)           Ref. Range 11/12/2021 12:04   Hep B Surface Agn Latest Ref Range: Nonreactive  Nonreactive   Hepatitis A Antibody IgG Latest Ref Range: Nonreactive  Nonreactive   Hepatitis B Core Odilia Latest Ref Range: Nonreactive  Nonreactive   Hepatitis C Antibody Latest Ref Range: Nonreactive  Nonreactive   HIV Antigen Antibody Combo Latest Ref Range: Nonreactive  Nonreactive        Ref. Range 2/26/2022 09:43 2/26/2022 09:47   Copper Latest Ref Range: 80.0 - 155.0 ug/dL 62.0 (L)     Copper Random Urine Latest Ref Range: <=3.2 ug/dL   <1.0   Creatinine Urine/Volume Latest Units: mg/dL   44   Creatinine Urine/24hr Latest Ref Range: 500 - 1400 mg/d   770         PATHOLOGY:  Peripheral Smear 12/27/21:     The red cells appear normochromic.  Poikilocytosis is minimal. Polychromasia is not increased. Rouleaux formation is not increased. The morphology of the platelets is normal. There is no significant platelet clumping. Leukocytes are quantitatively normal and morphologically unremarkable.          Final Diagnosis 2/23/22:   Peripheral blood  demonstrating thrombocytopenia with increased polychromasia             Final Diagnosis 1/12/22:   A. Liver, Biopsy:  - Steatohepatitis (NAFLD activity score = 6 of 8)  - Cirrhosis, stage 4.   Electronically signed by Zen Munoz MD PhD on 1/14/2022 at  2:25 PM   Clinical Information     History of fatty liver with biopsy 2013, now with MRI with concern for cirrhosis         Final Diagnosis 9/28/22:   Peripheral blood, morphology:  - Mild thrombocytopenia.  - Hemoglobin quantitatively within normal limits; erythrocytes macrocytic and with polychromasia.  - WBC subsets quantitatively within normal limits and without diagnostic morphologic abnormality.  - Negative for schistocytes.  - Negative for overt features of myelodysplasia or circulating blasts.         IMAGING:  US ABDOMEN LIMITED 3/25/2022:                                                       IMPRESSION: Normal appearance of the spleen. Normal size measuring 9.7 cm.     US ABDOMEN LIMITED 5/11/22:  IMPRESSION:  1.  Diffuse hepatic steatosis. No focal hepatic masses.    MRI ENTEROGRAPHY 7/27/22:  IMPRESSION:   1. Mild wall thickening and edema at the neoterminal ileum without  enhancement. This may be exaggerated by this segment being  decompressed. Findings could represent mild active inflammatory  Crohn's ileitis. No strictures, fistula or bowel obstruction or  abscess. Small bowel resection with ileoascending anastomosis and  short length of small bowel.     2. Heterogeneous hepatic parenchymal signal and enhancement, likely  related to known hepatic fibrosis. This is not fully evaluated on the  available sequences.       ASSESSMENT/PLAN:  Mercedes Arita is a 57 year old female who is referred to clinic for RBC abnormality and thrombocytopenia. Past medical history is significant for Crohn's disease on 6-MPH and PRINGLE.      Patient has had significant small bowel resection throughout her lifetime due to Crohn's disease and likely has underlying marrow  suppression due to poor absorption, PRINGLE, and long-term 6-MP therapy. Her recent labs have returned showing copper deficiency. She has evidence of reticulocytosis on smear with undetectable haptoglobin. Patient continues on monthly B12 self-injections as prophylaxis. HIV and hepatitis studies are negative.     Patient does not have evidence of anemia and has improvement in macrocytosis. She continues to have decreased copper levels despite supplementation. Urine copper returned normal.      Liver biopsy on 1/12/22 returned with steatohepatitis NAFLD score of 6 and cirrhosis, stage 4.     Abdominal U/S 3/25/22 negative for splenomegaly.     I discussed with the patient continued thrombocytopenia and coagulopathy are most likely due to underlying cirrhosis and possible splenic sequestration. Additionally, there is likely a component of bone marrow suppression. However, she continues to have evidence of elevated T. Bili, elevated LDH, and undetectable haptoglobin, which can be due to a combination of copper deficiency and underlying cirrhosis. We continue to discuss the possibility of a bone marrow biopsy to rule out an underlying marrow disorder, but will hold at this time.      Will stop copper supplementation at this time as this has not made significant difference.     Repeat labs in 6 months with follow up then.            Destiny Powell DO  Hematology/Oncology  [facility]

## 2022-10-04 ENCOUNTER — ONCOLOGY VISIT (OUTPATIENT)
Dept: ONCOLOGY | Facility: CLINIC | Age: 57
End: 2022-10-04
Attending: INTERNAL MEDICINE
Payer: COMMERCIAL

## 2022-10-04 VITALS
TEMPERATURE: 97.2 F | WEIGHT: 155.9 LBS | RESPIRATION RATE: 16 BRPM | HEART RATE: 101 BPM | DIASTOLIC BLOOD PRESSURE: 75 MMHG | HEIGHT: 62 IN | SYSTOLIC BLOOD PRESSURE: 134 MMHG | OXYGEN SATURATION: 97 % | BODY MASS INDEX: 28.69 KG/M2

## 2022-10-04 DIAGNOSIS — D69.6 THROMBOCYTOPENIA (H): Primary | ICD-10-CM

## 2022-10-04 PROCEDURE — G0463 HOSPITAL OUTPT CLINIC VISIT: HCPCS

## 2022-10-04 PROCEDURE — 99214 OFFICE O/P EST MOD 30 MIN: CPT | Performed by: INTERNAL MEDICINE

## 2022-10-04 RX ORDER — ACETAMINOPHEN 160 MG
2000 TABLET,DISINTEGRATING ORAL DAILY
COMMUNITY
End: 2023-04-04

## 2022-10-04 ASSESSMENT — PAIN SCALES - GENERAL: PAINLEVEL: NO PAIN (0)

## 2022-10-04 NOTE — LETTER
10/4/2022         RE: Mercedes Arita  [address] [facility] [id number]        Dear Colleague,    Thank you for referring your patient, Mercedes Arita, to the [facility]. Please see a copy of my visit note below.    [facility]    Hematology/Oncology Established Patient Follow-up Note    Treatment Summary:      Today's Date: 10/4/22    Reason for Follow-up: RBC abnormality  Referring Provider: Dr. Chevy Fonseca      HISTORY OF PRESENT ILLNESS: Mercedes Arita is a 57 year old female who is referred to clinic for RBC abnormality and thrombocytopenia. Past medical history is significant for Crohn's disease on 6-MPH and PRINGLE.      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 She had her first small bowel resection in 1987. She has had a total of three small bowel resections in her lifetime with the most recent in 2012. She is followed by GI and is evaluated once yearly at this point. She has been on and off 6-MP therapy for the past 35 years. She has not required steroid pulses or hospitalization in decades. She was attempted once on remicade, but had severe non-anaphylactic reaction to this and was never trialed again on biologics. 6-MP has been held since September due to CBC.      Recently, she had an MRE performed due to persistent liver enzymes, which showed advancement in fibrosis. Her most recent colonoscopy was only a year ago without abnormality. No blood in stools, fatigue, unintentional weight loss. No personal or family history of malignancy.     In January 2021, WBC 4.4, Hb 12.6, , , AST 90, ALT 46, T. Bili 1.0, D. Bili 0.2, total protein 7.8, serum albumin 3.5.      In April 2021, WBC 4.1, Hb 12.2, , , AST 42, ALT 48, total protein 7.4, serum albumin 3.4, total bili 0.6, d. Bili 0.3.      In June 2021, WBC 4.6, Hb 12.2, , , AST 38, ALT 41, total protein 7.5, serum albumin 3.4, total bili 1.0,  d. Bili 0.3.     In September 2021, WBC 4.6, Hb 11.9, , , AST 39, ALT 30, T. Bili 1.2, D. Bili 0.38.     On 10/5/21: WBC 5.4, Hb 12, , , normal diff.       INTERIM HISTORY:  No acute events since our last visit. She has discontinued copper supplementation. No change in energy levels, unintentional weight loss, neuropathy.      REVIEW OF SYSTEMS:   A 14 point ROS was reviewed with pertinent positives and negatives in the HPI.       HOME MEDICATIONS:  Current Outpatient Medications   Medication Sig Dispense Refill     Copper 5 MG CAPS Take 1 capsule by mouth daily (Patient taking differently: Take 1 capsule by mouth every other day ) 30 capsule 1     cyanocobalamin 1000 MCG/ML injection Inject 1 mL as directed every 30 days.       mercaptopurine (PURINETHOL) 50 MG tablet Take 100 mg by mouth At Bedtime  (Patient not taking: Reported on 12/29/2021)       omeprazole (PRILOSEC) 20 MG DR capsule Take 20 mg by mouth daily           ALLERGIES:  Allergies   Allergen Reactions     Contrast Dye Other (See Comments)     Sneezed once. This was ten years ago and pt doesn't recall any other symptoms.         PAST MEDICAL HISTORY:  Past Medical History:   Diagnosis Date     Chronic pain     Abdominal pain     Crohn's     Chrons     Crohn's disease (H)     diagnosed 1983     PONV (postoperative nausea and vomiting)          PAST SURGICAL HISTORY:  Past Surgical History:   Procedure Laterality Date     CHOLECYSTECTOMY       DILATE RECTUM  9/19/2012    Procedure: DILATE RECTUM;  Exam Under Anesthesia, Dilation with Stricture, lysis of adhesions, Laparoscopic hand assisted Ileocolic Resection ;  Surgeon: Magy Macias MD;  Location:  OR     GASTROSCOPY N/A 1/18/2018    Procedure: GASTROSCOPY;  GASTROSCOPY WITH balloon DILATION of duodenal stricture up to 13.5mm UNDER FLUOROSCOPY (MAC SEDATION) ;  Surgeon: Aaliyah Langley MD;  Location:  OR     GI SURGERY  1987,1996,2012    bowel  "resection times 3 ileo-colonic resections     HERNIA REPAIR      Ventral times 2 with mesh.     LAPAROSCOPIC RESECTION ILEOCECAL  2012    Procedure: LAPAROSCOPIC RESECTION ILEOCECAL;;  Surgeon: Magy Macias MD;  Location: [facility] OR     LAPAROTOMY EXPLORATORY      For removal of adhesions.     MOHS MICROGRAPHIC PROCEDURE      Removal of skin cancer on face.     ORTHOPEDIC SURGERY      carpal tunnel         SOCIAL HISTORY:  Social History     Socioeconomic History     Marital status: Single     Spouse name: Not on file     Number of children: Not on file     Years of education: Not on file     Highest education level: Not on file   Occupational History     Not on file   Tobacco Use     Smoking status: Never Smoker     Smokeless tobacco: Never Used   Substance and Sexual Activity     Alcohol use: Yes     Comment: 2 times yearly.     Drug use: No     Sexual activity: Yes     Partners: Male   Other Topics Concern     Parent/sibling w/ CABG, MI or angioplasty before 65F 55M? Not Asked   Social History Narrative     Not on file     Social Determinants of Health     Financial Resource Strain: Not on file   Food Insecurity: Not on file   Transportation Needs: Not on file   Physical Activity: Not on file   Stress: Not on file   Social Connections: Not on file   Intimate Partner Violence: Not At Risk     Fear of Current or Ex-Partner: No     Emotionally Abused: No     Physically Abused: No     Sexually Abused: No   Housing Stability: Not on file         FAMILY HISTORY:  No family history on file.      PHYSICAL EXAM:  Vital signs:  /75   Pulse 101   Temp 97.2  F (36.2  C) (Tympanic)   Resp 16   Ht 1.575 m (5' 2\")   Wt 70.7 kg (155 lb 14.4 oz)   SpO2 97%   BMI 28.51 kg/m     ECO  GENERAL/CONSTITUTIONAL: No acute distress.  EYES: Pupils are equal, round, and react to light and accommodation. Extraocular movements intact.  No scleral icterus.  ENT/MOUTH: Neck supple. Oropharynx clear, no " mucositis.  LYMPH: No anterior cervical, posterior cervical, supraclavicular, axillary or inguinal adenopathy.   RESPIRATORY: Clear to auscultation bilaterally. No crackles or wheezing.   CARDIOVASCULAR: Regular rate and rhythm without murmurs, gallops, or rubs.  GASTROINTESTINAL: No hepatosplenomegaly, masses, or tenderness. The patient has normal bowel sounds. No guarding.  No distention.  MUSCULOSKELETAL: Warm and well-perfused, no cyanosis, clubbing, or edema.  NEUROLOGIC: Cranial nerves II-XII are intact. Alert, oriented, answers questions appropriately.  INTEGUMENTARY: No rashes or jaundice.  GAIT: Steady, does not use assistive device.      LABS:   Latest Reference Range & Units 09/28/22 14:52   Sodium 136 - 145 mmol/L 141   Potassium 3.4 - 5.3 mmol/L 4.0   Chloride 98 - 107 mmol/L 108 (H)   Carbon Dioxide (CO2) 22 - 29 mmol/L 21 (L)   Urea Nitrogen 6.0 - 20.0 mg/dL 7.7   Creatinine 0.51 - 0.95 mg/dL 0.63   GFR Estimate >60 mL/min/1.73m2 >90   Calcium 8.6 - 10.0 mg/dL 9.1   Anion Gap 7 - 15 mmol/L 12   Albumin 3.5 - 5.2 g/dL 3.6   Protein Total 6.4 - 8.3 g/dL 7.7   Alkaline Phosphatase 35 - 104 U/L 119 (H)   ALT 10 - 35 U/L 34   AST 10 - 35 U/L 44 (H)   Bilirubin Total <=1.2 mg/dL 1.4 (H)   Glucose 70 - 99 mg/dL 90   WBC 4.0 - 11.0 10e3/uL 5.9   Hemoglobin 11.7 - 15.7 g/dL 12.2   Hematocrit 35.0 - 47.0 % 38.9   Platelet Count 150 - 450 10e3/uL 132 (L)   RBC Count 3.80 - 5.20 10e6/uL 3.87   MCV 78 - 100 fL 101 (H)   MCH 26.5 - 33.0 pg 31.5   MCHC 31.5 - 36.5 g/dL 31.4 (L)   RDW 10.0 - 15.0 % 14.6   % Neutrophils % 50   % Lymphocytes % 34   % Monocytes % 11   % Eosinophils % 4   % Basophils % 1   Absolute Basophils 0.0 - 0.2 10e3/uL 0.1   Absolute Eosinophils 0.0 - 0.7 10e3/uL 0.3   Absolute Immature Granulocytes <=0.4 10e3/uL 0.0   Absolute Lymphocytes 0.8 - 5.3 10e3/uL 2.0   Absolute Monocytes 0.0 - 1.3 10e3/uL 0.6   % Immature Granulocytes % 0   Absolute Neutrophils 1.6 - 8.3 10e3/uL 2.9   Absolute NRBCs  10e3/uL 0.0   NRBCs per 100 WBC <1 /100 0   % Retic 0.5 - 2.0 % 2.6 (H)   Absolute Retic 0.025 - 0.095 10e6/uL 0.101 (H)           Ferritin Latest Ref Range: 8 - 252 ng/mL 62   Folate Latest Ref Range: >=5.4 ng/mL 25.4   Iron Latest Ref Range: 35 - 180 ug/dL 96   Iron Binding Cap Latest Ref Range: 240 - 430 ug/dL 385   Iron Saturation Index Latest Ref Range: 15 - 46 % 25   Lactate Dehydrogenase Latest Ref Range: 81 - 234 U/L 254 (H)   Vitamin B12 Latest Ref Range: 193 - 986 pg/mL 589           Ref. Range 12/27/2021 13:53   MAR  Broad Spectrum Latest Ref Range: Negative  NEG   Haptoglobin Latest Ref Range: 32 - 197 mg/dL <3 (L)           Ref. Range 11/12/2021 12:04   Copper Latest Ref Range: 80.0 - 155.0 ug/dL 72.3 (L)   Folate Latest Ref Range: >=5.4 ng/mL 35.1   Lactate Dehydrogenase Latest Ref Range: 81 - 234 U/L 277 (H)   Vitamin B12 Latest Ref Range: 193 - 986 pg/mL 655           Ref. Range 11/12/2021 12:04   INR Latest Ref Range: 0.85 - 1.15  1.30 (H)   PTT Latest Ref Range: 22 - 38 Seconds 36   Fibrinogen Latest Ref Range: 170 - 490 mg/dL 300           Ref. Range 11/12/2021 12:04   Haptoglobin Latest Ref Range: 32 - 197 mg/dL <3 (L)           Ref. Range 11/12/2021 12:04   Hep B Surface Agn Latest Ref Range: Nonreactive  Nonreactive   Hepatitis A Antibody IgG Latest Ref Range: Nonreactive  Nonreactive   Hepatitis B Core Odilia Latest Ref Range: Nonreactive  Nonreactive   Hepatitis C Antibody Latest Ref Range: Nonreactive  Nonreactive   HIV Antigen Antibody Combo Latest Ref Range: Nonreactive  Nonreactive        Ref. Range 2/26/2022 09:43 2/26/2022 09:47   Copper Latest Ref Range: 80.0 - 155.0 ug/dL 62.0 (L)     Copper Random Urine Latest Ref Range: <=3.2 ug/dL   <1.0   Creatinine Urine/Volume Latest Units: mg/dL   44   Creatinine Urine/24hr Latest Ref Range: 500 - 1400 mg/d   770         PATHOLOGY:  Peripheral Smear 12/27/21:     The red cells appear normochromic.  Poikilocytosis is minimal. Polychromasia is not  increased. Rouleaux formation is not increased. The morphology of the platelets is normal. There is no significant platelet clumping. Leukocytes are quantitatively normal and morphologically unremarkable.          Final Diagnosis 2/23/22:   Peripheral blood demonstrating thrombocytopenia with increased polychromasia             Final Diagnosis 1/12/22:   A. Liver, Biopsy:  - Steatohepatitis (NAFLD activity score = 6 of 8)  - Cirrhosis, stage 4.   Electronically signed by Zen Munoz MD PhD on 1/14/2022 at  2:25 PM   Clinical Information     History of fatty liver with biopsy 2013, now with MRI with concern for cirrhosis         Final Diagnosis 9/28/22:   Peripheral blood, morphology:  - Mild thrombocytopenia.  - Hemoglobin quantitatively within normal limits; erythrocytes macrocytic and with polychromasia.  - WBC subsets quantitatively within normal limits and without diagnostic morphologic abnormality.  - Negative for schistocytes.  - Negative for overt features of myelodysplasia or circulating blasts.         IMAGING:  US ABDOMEN LIMITED 3/25/2022:                                                       IMPRESSION: Normal appearance of the spleen. Normal size measuring 9.7 cm.     US ABDOMEN LIMITED 5/11/22:  IMPRESSION:  1.  Diffuse hepatic steatosis. No focal hepatic masses.    MRI ENTEROGRAPHY 7/27/22:  IMPRESSION:   1. Mild wall thickening and edema at the neoterminal ileum without  enhancement. This may be exaggerated by this segment being  decompressed. Findings could represent mild active inflammatory  Crohn's ileitis. No strictures, fistula or bowel obstruction or  abscess. Small bowel resection with ileoascending anastomosis and  short length of small bowel.     2. Heterogeneous hepatic parenchymal signal and enhancement, likely  related to known hepatic fibrosis. This is not fully evaluated on the  available sequences.       ASSESSMENT/PLAN:  Mercedes Arita is a 57 year old female who is referred to  clinic for RBC abnormality and thrombocytopenia. Past medical history is significant for Crohn's disease on 6-MPH and PRINGLE.      Patient has had significant small bowel resection throughout her lifetime due to Crohn's disease and likely has underlying marrow suppression due to poor absorption, PRINGLE, and long-term 6-MP therapy. Her recent labs have returned showing copper deficiency. She has evidence of reticulocytosis on smear with undetectable haptoglobin. Patient continues on monthly B12 self-injections as prophylaxis. HIV and hepatitis studies are negative.     Patient does not have evidence of anemia and has improvement in macrocytosis. She continues to have decreased copper levels despite supplementation. Urine copper returned normal.      Liver biopsy on 1/12/22 returned with steatohepatitis NAFLD score of 6 and cirrhosis, stage 4.     Abdominal U/S 3/25/22 negative for splenomegaly.     I discussed with the patient continued thrombocytopenia and coagulopathy are most likely due to underlying cirrhosis and possible splenic sequestration. Additionally, there is likely a component of bone marrow suppression. However, she continues to have evidence of elevated T. Bili, elevated LDH, and undetectable haptoglobin, which can be due to a combination of copper deficiency and underlying cirrhosis. We continue to discuss the possibility of a bone marrow biopsy to rule out an underlying marrow disorder, but will hold at this time.      Will stop copper supplementation at this time as this has not made significant difference.     Repeat labs in 6 months with follow up then.            Destiny Powell DO  Hematology/Oncology  [facility]              Again, thank you for allowing me to participate in the care of your patient.        Sincerely,        Destiny Powell DO

## 2022-10-04 NOTE — NURSING NOTE
"Oncology Rooming Note    October 4, 2022 12:55 PM   Mercedes Arita is a 57 year old female who presents for:    Chief Complaint   Patient presents with     Oncology Clinic Visit     Copper deficiency      Initial Vitals: /75   Pulse 101   Temp 97.2  F (36.2  C) (Tympanic)   Resp 16   Ht 1.575 m (5' 2\")   Wt 70.7 kg (155 lb 14.4 oz)   SpO2 97%   BMI 28.51 kg/m   Estimated body mass index is 28.51 kg/m  as calculated from the following:    Height as of this encounter: 1.575 m (5' 2\").    Weight as of this encounter: 70.7 kg (155 lb 14.4 oz). Body surface area is 1.76 meters squared.  No Pain (0) Comment: Data Unavailable   No LMP recorded. Patient is postmenopausal.  Allergies reviewed: Yes  Medications reviewed: Yes    Medications: Medication refills not needed today.  Pharmacy name entered into [employer]: [id number] [place], [place] - [address]    Clinical concerns: follow up        Genny Hernandez CMA              "

## 2022-10-17 ENCOUNTER — APPOINTMENT (OUTPATIENT)
Dept: CT IMAGING | Facility: CLINIC | Age: 57
DRG: 442 | End: 2022-10-17
Attending: EMERGENCY MEDICINE
Payer: COMMERCIAL

## 2022-10-17 ENCOUNTER — HOSPITAL ENCOUNTER (INPATIENT)
Facility: CLINIC | Age: 57
LOS: 2 days | Discharge: HOME | DRG: 442 | End: 2022-10-19
Attending: EMERGENCY MEDICINE | Admitting: INTERNAL MEDICINE
Payer: COMMERCIAL

## 2022-10-17 DIAGNOSIS — K76.82 HEPATIC ENCEPHALOPATHY (H): ICD-10-CM

## 2022-10-17 LAB
ALBUMIN SERPL BCG-MCNC: 3.8 G/DL (ref 3.5–5.2)
ALBUMIN UR-MCNC: NEGATIVE MG/DL
ALP SERPL-CCNC: 115 U/L (ref 35–104)
ALT SERPL W P-5'-P-CCNC: 41 U/L (ref 10–35)
AMMONIA PLAS-SCNC: 103 UMOL/L (ref 11–51)
AMPHETAMINES UR QL SCN: NORMAL
ANION GAP SERPL CALCULATED.3IONS-SCNC: 15 MMOL/L (ref 7–15)
APPEARANCE UR: CLEAR
AST SERPL W P-5'-P-CCNC: 50 U/L (ref 10–35)
BARBITURATES UR QL SCN: NORMAL
BASOPHILS # BLD AUTO: 0.1 10E3/UL (ref 0–0.2)
BASOPHILS NFR BLD AUTO: 1 %
BENZODIAZ UR QL SCN: NORMAL
BILIRUB SERPL-MCNC: 1.9 MG/DL
BILIRUB UR QL STRIP: NEGATIVE
BUN SERPL-MCNC: 10.6 MG/DL (ref 6–20)
BZE UR QL SCN: NORMAL
CALCIUM SERPL-MCNC: 9.3 MG/DL (ref 8.6–10)
CANNABINOIDS UR QL SCN: NORMAL
CHLORIDE SERPL-SCNC: 106 MMOL/L (ref 98–107)
COLOR UR AUTO: ABNORMAL
CREAT SERPL-MCNC: 0.65 MG/DL (ref 0.51–0.95)
DEPRECATED HCO3 PLAS-SCNC: 18 MMOL/L (ref 22–29)
EOSINOPHIL # BLD AUTO: 0.1 10E3/UL (ref 0–0.7)
EOSINOPHIL NFR BLD AUTO: 2 %
ERYTHROCYTE [DISTWIDTH] IN BLOOD BY AUTOMATED COUNT: 14.7 % (ref 10–15)
ETHANOL SERPL-MCNC: <0.01 G/DL
GFR SERPL CREATININE-BSD FRML MDRD: >90 ML/MIN/1.73M2
GLUCOSE BLDC GLUCOMTR-MCNC: 115 MG/DL (ref 70–99)
GLUCOSE SERPL-MCNC: 128 MG/DL (ref 70–99)
GLUCOSE UR STRIP-MCNC: NEGATIVE MG/DL
HCT VFR BLD AUTO: 39.8 % (ref 35–47)
HGB BLD-MCNC: 12.6 G/DL (ref 11.7–15.7)
HGB UR QL STRIP: NEGATIVE
IMM GRANULOCYTES # BLD: 0 10E3/UL
IMM GRANULOCYTES NFR BLD: 0 %
KETONES UR STRIP-MCNC: NEGATIVE MG/DL
LEUKOCYTE ESTERASE UR QL STRIP: ABNORMAL
LYMPHOCYTES # BLD AUTO: 1.5 10E3/UL (ref 0.8–5.3)
LYMPHOCYTES NFR BLD AUTO: 23 %
MAGNESIUM SERPL-MCNC: 1.8 MG/DL (ref 1.7–2.3)
MCH RBC QN AUTO: 31.2 PG (ref 26.5–33)
MCHC RBC AUTO-ENTMCNC: 31.7 G/DL (ref 31.5–36.5)
MCV RBC AUTO: 99 FL (ref 78–100)
MONOCYTES # BLD AUTO: 0.4 10E3/UL (ref 0–1.3)
MONOCYTES NFR BLD AUTO: 6 %
NEUTROPHILS # BLD AUTO: 4.2 10E3/UL (ref 1.6–8.3)
NEUTROPHILS NFR BLD AUTO: 68 %
NITRATE UR QL: NEGATIVE
NRBC # BLD AUTO: 0 10E3/UL
NRBC BLD AUTO-RTO: 0 /100
OPIATES UR QL SCN: NORMAL
PH UR STRIP: 5.5 [PH] (ref 5–7)
PLATELET # BLD AUTO: 146 10E3/UL (ref 150–450)
POTASSIUM SERPL-SCNC: 3.4 MMOL/L (ref 3.4–5.3)
PROT SERPL-MCNC: 8 G/DL (ref 6.4–8.3)
RBC # BLD AUTO: 4.04 10E6/UL (ref 3.8–5.2)
RBC URINE: <1 /HPF
SARS-COV-2 RNA RESP QL NAA+PROBE: NEGATIVE
SODIUM SERPL-SCNC: 139 MMOL/L (ref 136–145)
SP GR UR STRIP: 1.01 (ref 1–1.03)
SQUAMOUS EPITHELIAL: <1 /HPF
TSH SERPL DL<=0.005 MIU/L-ACNC: 0.69 UIU/ML (ref 0.3–4.2)
UROBILINOGEN UR STRIP-MCNC: NORMAL MG/DL
WBC # BLD AUTO: 6.2 10E3/UL (ref 4–11)
WBC URINE: 2 /HPF

## 2022-10-17 PROCEDURE — 250N000013 HC RX MED GY IP 250 OP 250 PS 637: Performed by: EMERGENCY MEDICINE

## 2022-10-17 PROCEDURE — 99223 1ST HOSP IP/OBS HIGH 75: CPT | Mod: AI | Performed by: INTERNAL MEDICINE

## 2022-10-17 PROCEDURE — 85004 AUTOMATED DIFF WBC COUNT: CPT | Performed by: EMERGENCY MEDICINE

## 2022-10-17 PROCEDURE — 0042T CT HEAD PERFUSION W CONTRAST: CPT

## 2022-10-17 PROCEDURE — 80053 COMPREHEN METABOLIC PANEL: CPT | Performed by: EMERGENCY MEDICINE

## 2022-10-17 PROCEDURE — 84443 ASSAY THYROID STIM HORMONE: CPT | Performed by: EMERGENCY MEDICINE

## 2022-10-17 PROCEDURE — 250N000013 HC RX MED GY IP 250 OP 250 PS 637: Performed by: INTERNAL MEDICINE

## 2022-10-17 PROCEDURE — 96374 THER/PROPH/DIAG INJ IV PUSH: CPT | Mod: 59

## 2022-10-17 PROCEDURE — 81001 URINALYSIS AUTO W/SCOPE: CPT | Performed by: EMERGENCY MEDICINE

## 2022-10-17 PROCEDURE — 70450 CT HEAD/BRAIN W/O DYE: CPT

## 2022-10-17 PROCEDURE — 70498 CT ANGIOGRAPHY NECK: CPT

## 2022-10-17 PROCEDURE — 82077 ASSAY SPEC XCP UR&BREATH IA: CPT | Performed by: EMERGENCY MEDICINE

## 2022-10-17 PROCEDURE — 250N000011 HC RX IP 250 OP 636: Performed by: EMERGENCY MEDICINE

## 2022-10-17 PROCEDURE — 96361 HYDRATE IV INFUSION ADD-ON: CPT

## 2022-10-17 PROCEDURE — 36415 COLL VENOUS BLD VENIPUNCTURE: CPT | Performed by: EMERGENCY MEDICINE

## 2022-10-17 PROCEDURE — 82140 ASSAY OF AMMONIA: CPT | Performed by: EMERGENCY MEDICINE

## 2022-10-17 PROCEDURE — 250N000009 HC RX 250: Performed by: EMERGENCY MEDICINE

## 2022-10-17 PROCEDURE — 80307 DRUG TEST PRSMV CHEM ANLYZR: CPT | Performed by: EMERGENCY MEDICINE

## 2022-10-17 PROCEDURE — C9803 HOPD COVID-19 SPEC COLLECT: HCPCS

## 2022-10-17 PROCEDURE — 120N000001 HC R&B MED SURG/OB

## 2022-10-17 PROCEDURE — 258N000003 HC RX IP 258 OP 636: Performed by: EMERGENCY MEDICINE

## 2022-10-17 PROCEDURE — 99285 EMERGENCY DEPT VISIT HI MDM: CPT | Mod: 25

## 2022-10-17 PROCEDURE — 70496 CT ANGIOGRAPHY HEAD: CPT

## 2022-10-17 PROCEDURE — 258N000003 HC RX IP 258 OP 636: Performed by: INTERNAL MEDICINE

## 2022-10-17 PROCEDURE — U0003 INFECTIOUS AGENT DETECTION BY NUCLEIC ACID (DNA OR RNA); SEVERE ACUTE RESPIRATORY SYNDROME CORONAVIRUS 2 (SARS-COV-2) (CORONAVIRUS DISEASE [COVID-19]), AMPLIFIED PROBE TECHNIQUE, MAKING USE OF HIGH THROUGHPUT TECHNOLOGIES AS DESCRIBED BY CMS-2020-01-R: HCPCS | Performed by: EMERGENCY MEDICINE

## 2022-10-17 PROCEDURE — 83735 ASSAY OF MAGNESIUM: CPT | Performed by: EMERGENCY MEDICINE

## 2022-10-17 RX ORDER — ACETAMINOPHEN 650 MG/1
650 SUPPOSITORY RECTAL EVERY 6 HOURS PRN
Status: DISCONTINUED | OUTPATIENT
Start: 2022-10-17 | End: 2022-10-19 | Stop reason: HOSPADM

## 2022-10-17 RX ORDER — FAMOTIDINE 20 MG/1
20 TABLET, FILM COATED ORAL 2 TIMES DAILY
Status: DISCONTINUED | OUTPATIENT
Start: 2022-10-17 | End: 2022-10-19 | Stop reason: HOSPADM

## 2022-10-17 RX ORDER — ONDANSETRON 2 MG/ML
4 INJECTION INTRAMUSCULAR; INTRAVENOUS EVERY 6 HOURS PRN
Status: DISCONTINUED | OUTPATIENT
Start: 2022-10-17 | End: 2022-10-19 | Stop reason: HOSPADM

## 2022-10-17 RX ORDER — ONDANSETRON 2 MG/ML
4 INJECTION INTRAMUSCULAR; INTRAVENOUS ONCE
Status: COMPLETED | OUTPATIENT
Start: 2022-10-17 | End: 2022-10-17

## 2022-10-17 RX ORDER — LIDOCAINE 40 MG/G
CREAM TOPICAL
Status: DISCONTINUED | OUTPATIENT
Start: 2022-10-17 | End: 2022-10-19 | Stop reason: HOSPADM

## 2022-10-17 RX ORDER — ACETAMINOPHEN 325 MG/1
650 TABLET ORAL EVERY 6 HOURS PRN
Status: DISCONTINUED | OUTPATIENT
Start: 2022-10-17 | End: 2022-10-19 | Stop reason: HOSPADM

## 2022-10-17 RX ORDER — LACTULOSE 10 G/15ML
20 SOLUTION ORAL ONCE
Status: COMPLETED | OUTPATIENT
Start: 2022-10-17 | End: 2022-10-17

## 2022-10-17 RX ORDER — IOPAMIDOL 755 MG/ML
500 INJECTION, SOLUTION INTRAVASCULAR ONCE
Status: COMPLETED | OUTPATIENT
Start: 2022-10-17 | End: 2022-10-17

## 2022-10-17 RX ORDER — SODIUM CHLORIDE 9 MG/ML
INJECTION, SOLUTION INTRAVENOUS CONTINUOUS
Status: DISCONTINUED | OUTPATIENT
Start: 2022-10-17 | End: 2022-10-18

## 2022-10-17 RX ORDER — LACTULOSE 10 G/15ML
10-20 SOLUTION ORAL 2 TIMES DAILY
Status: DISCONTINUED | OUTPATIENT
Start: 2022-10-17 | End: 2022-10-19

## 2022-10-17 RX ORDER — ONDANSETRON 4 MG/1
4 TABLET, ORALLY DISINTEGRATING ORAL EVERY 6 HOURS PRN
Status: DISCONTINUED | OUTPATIENT
Start: 2022-10-17 | End: 2022-10-19 | Stop reason: HOSPADM

## 2022-10-17 RX ADMIN — SODIUM CHLORIDE: 9 INJECTION, SOLUTION INTRAVENOUS at 18:54

## 2022-10-17 RX ADMIN — SODIUM CHLORIDE 95 ML: 9 INJECTION, SOLUTION INTRAVENOUS at 16:45

## 2022-10-17 RX ADMIN — LACTULOSE 20 G: 20 SOLUTION ORAL at 18:16

## 2022-10-17 RX ADMIN — IOPAMIDOL 120 ML: 755 INJECTION, SOLUTION INTRAVENOUS at 16:44

## 2022-10-17 RX ADMIN — ONDANSETRON 4 MG: 2 INJECTION INTRAMUSCULAR; INTRAVENOUS at 18:02

## 2022-10-17 RX ADMIN — SODIUM CHLORIDE: 900 INJECTION INTRAVENOUS at 20:35

## 2022-10-17 RX ADMIN — FAMOTIDINE 20 MG: 20 TABLET ORAL at 21:18

## 2022-10-17 RX ADMIN — SODIUM CHLORIDE 1000 ML: 9 INJECTION, SOLUTION INTRAVENOUS at 17:37

## 2022-10-17 ASSESSMENT — ENCOUNTER SYMPTOMS
ABDOMINAL PAIN: 0
HEADACHES: 0
DIARRHEA: 0

## 2022-10-17 ASSESSMENT — ACTIVITIES OF DAILY LIVING (ADL)
ADLS_ACUITY_SCORE: 35

## 2022-10-17 NOTE — ED TRIAGE NOTES
Woke up this morning around 0500, shaking and unsteady gait. Took a nap later today and woke around 1400. Now unable to remember the date, that she had shoes on. Moving slower than normal. Brain feels foggy. Unsteady gait

## 2022-10-17 NOTE — CONSULTS
[facility]    Stroke Telephone Note    I was called by Nolan Smith on 10/17/22 regarding patient Mercedes Arita. The patient is a 57 year old female who was last known well some time last night. Today, she woke up feeling foggy and unsteady. She went back to bed and woke up again with the same symptoms. No focal findings on neuro exam in the ED but she was disoriented and confused. Stroke code tier 2 activated in the ED.    BP (!) 143/85   Pulse 116   Temp 97.6  F (36.4  C) (Temporal)   Resp 20   SpO2 100%        Stroke Code Data (for stroke code without tele)  Stroke code activated 10/17/22   1622   Stroke provider first response  10/17/22   1624            Last known normal 10/16/22        Unknown   Time of discovery   (or onset of symptoms) 10/17/22   0500   Head CT read by Stroke Neuro Dr/Provider 10/17/22   1645   Was stroke code de-escalated? No 10/17/22 1653          Imaging Findings   Head CT: no acute pathology   CTA head and neck: no LVO       Intravenous Thrombolysis  Not given due to:   - unclear or unfavorable risk-benefit profile for extended window thrombolysis beyond the conventional 4.5 hour time window    Endovascular Treatment  Not initiated due to absence of proximal vessel occlusion    Impression  Acute encephalopathy of unknown cause.   Can't rule out stroke. Not a candidate for any acute intervention.     Recommendations   MRI brain with contrast. If there is stroke on MRI, please call me back for further recommendations. If there is no stroke on MRI, recommend general neurology consultation.     My recommendations are based on the information provided over the phone by Mercedes Arita's in-person providers. They are not intended to replace the clinical judgment of her in-person providers. I was not requested to personally see or examine the patient at this time.        Leena Davis MD, Msc, FAHA, FAAN   of Neurology  University   "[place]     10/17/2022 4:56 PM  To page me or covering stroke neurology team member, click here: AMCOM  Choose \"On Call\" tab at top, then search dropdown box for \"Neurology Adult\" & press Enter, look for Neuro ICU/Stroke      "

## 2022-10-17 NOTE — ED PROVIDER NOTES
"  History   Chief Complaint:  Neurologic Problem       The history is provided by the patient and a significant other.      Mercedes Arita is a 57 year old female with history of neuropathy and Crohn disease who presents with neurologic problem. Patient's significant other reports that the patient woke up around 5 in the morning and noticed that the patient was having a shaking episode. Shaking episodes are normal and usually goes away after eating and hydrating. He shares that he noticed the patient's symptoms of confusion around 1400 and started asking her questions, which she were not able to answer such as the date and who the president is. He also shares that there have been changes in the way she walks. Patient states that she has been feeling \"off and cloudy\" since this morning. She denies any headache, chest pain, abdominal pain, and diarrhea. Patient has Crohn's disease but stopped taking her prescribed medication because she \"got tired of it\". Patient is seeing a liver doctor and getting enzymes recently.     Review of Systems   Cardiovascular: Negative for chest pain.   Gastrointestinal: Negative for abdominal pain and diarrhea.   Neurological: Negative for headaches.   All other systems reviewed and are negative.    Allergies:  No Known Allergies    Medications:  Cyanocobalamin  Mercaptopurine  Omeprazole     Past Medical History:     Neuropathy  Injury of knee, leg, ankle and foot  Malignant neoplasm of skin of parts of face  Mitral valve disorder  Regional enteritis of small intestine with large intestine   Crohn disease    Past Surgical History:    Cholecystectomy  Dilate rectum  GI surgery  Hernia repair  Laparoscopic resection ileocecal  Laparotomy exploratory   Mohs micrographic procedure  Orthopedic surgery   Bowel resection  Lysis of adhesions  Small intestine surgery  Endo wrst surg rel trns carp lig     Family History:    Mother - hyperlipidemia   Father - hypertension, cerebrovascular " disease    Social History:  PCP: Chevy Fonseca   Presents with significant other.   Presents via private vehicle.     Physical Exam     Patient Vitals for the past 24 hrs:   BP Temp Temp src Pulse Resp SpO2   10/17/22 1614 (!) 143/85 97.6  F (36.4  C) Temporal 116 20 100 %       Physical Exam  Vitals reviewed.   HENT:      Head: Normocephalic.      Mouth/Throat:      Mouth: Mucous membranes are moist.   Eyes:      Pupils: Pupils are equal, round, and reactive to light.   Cardiovascular:      Rate and Rhythm: Normal rate.   Pulmonary:      Effort: Pulmonary effort is normal.   Abdominal:      General: Abdomen is flat.      Palpations: Abdomen is soft.   Musculoskeletal:         General: Normal range of motion.   Skin:     General: Skin is warm.      Capillary Refill: Capillary refill takes less than 2 seconds.   Neurological:      Mental Status: She is alert. She is disoriented.   Psychiatric:         Mood and Affect: Mood normal.         Emergency Department Course   ECG  ECG results from 10/20/17   EKG 12 lead     Value    Interpretation ECG Click View Image link to view waveform and result       Imaging:  CT Head Perfusion w Contrast   Final Result   IMPRESSION:    HEAD CT:   1.  No acute intracranial abnormality.      HEAD CTA:    1.  Normal CTA [place].      NECK CTA:   1.  Normal neck CTA.      CT PERFUSION:   1.  Normal cerebral perfusion.      2.  Findings called to Dr. Smith in the [facility] emergency room at 5:10 PM on 10/17/2022.      CTA Head Neck w Contrast   Final Result   IMPRESSION:    HEAD CT:   1.  No acute intracranial abnormality.      HEAD CTA:    1.  Normal CTA [place].      NECK CTA:   1.  Normal neck CTA.      CT PERFUSION:   1.  Normal cerebral perfusion.      2.  Findings called to Dr. Smith in the [facility] emergency room at 5:10 PM on 10/17/2022.      CT Head w/o Contrast   Final Result   IMPRESSION:    HEAD CT:   1.  No acute intracranial abnormality.      HEAD CTA:     1.  Normal CTA [place].      NECK CTA:   1.  Normal neck CTA.      CT PERFUSION:   1.  Normal cerebral perfusion.      2.  Findings called to Dr. Smith in the [facility] emergency room at 5:10 PM on 10/17/2022.        Report per radiology    Laboratory:  Labs Ordered and Resulted from Time of ED Arrival to Time of ED Departure   GLUCOSE BY METER - Abnormal       Result Value    GLUCOSE BY METER POCT 115 (*)    COMPREHENSIVE METABOLIC PANEL - Abnormal    Sodium 139      Potassium 3.4      Chloride 106      Carbon Dioxide (CO2) 18 (*)     Anion Gap 15      Urea Nitrogen 10.6      Creatinine 0.65      Calcium 9.3      Glucose 128 (*)     Alkaline Phosphatase 115 (*)     AST 50 (*)     ALT 41 (*)     Protein Total 8.0      Albumin 3.8      Bilirubin Total 1.9 (*)     GFR Estimate >90     AMMONIA - Abnormal    Ammonia 103 (*)    ETHYL ALCOHOL LEVEL - Abnormal    Alcohol ethyl <0.01 (*)    CBC WITH PLATELETS AND DIFFERENTIAL - Abnormal    WBC Count 6.2      RBC Count 4.04      Hemoglobin 12.6      Hematocrit 39.8      MCV 99      MCH 31.2      MCHC 31.7      RDW 14.7      Platelet Count 146 (*)     % Neutrophils 68      % Lymphocytes 23      % Monocytes 6      % Eosinophils 2      % Basophils 1      % Immature Granulocytes 0      NRBCs per 100 WBC 0      Absolute Neutrophils 4.2      Absolute Lymphocytes 1.5      Absolute Monocytes 0.4      Absolute Eosinophils 0.1      Absolute Basophils 0.1      Absolute Immature Granulocytes 0.0      Absolute NRBCs 0.0     MAGNESIUM - Normal    Magnesium 1.8     TSH WITH FREE T4 REFLEX - Normal    TSH 0.69     DRUG ABUSE SCREEN 1 URINE (ED) - Normal    Amphetamines Urine Screen Negative      Barbituates Urine Screen Negative      Benzodiazepine Urine Screen Negative      Cannabinoids Urine Screen Negative      Cocaine Urine Screen Negative      Opiates Urine Screen Negative     ROUTINE UA WITH MICROSCOPIC REFLEX TO CULTURE   COVID-19 VIRUS (CORONAVIRUS) BY PCR      Emergency  Department Course:     Reviewed:  I reviewed nursing notes, vitals, past medical history and Care Everywhere    Assessments:  1651 I obtained history and examined the patient as noted above.   1800 I rechecked the patient and explained findings.       Consults:  1709 I consulted with Dr. Kirby, Radiology, about patient's results.  1821 I consulted with Dr. Marsh, Hospitalist, who accepts admission.     Interventions:  1737 NS. 1L. IV.  1802 Ondansetron. 4 mg. IV.   1816 Lactulose. 20 g. PO.    Disposition:  The patient was admitted to the hospital under the care of Dr. Chevy Marsh.     Impression & Plan   Medical Decision Making:  Patient is a 57-year-old me female with a history of [place]ang who presents with intermittent altered mental status now worse.  Patient was called a stroke code by triage physician however on arrival with no focal findings were found patient is somewhat confused as to history of liver disease ammonia level is quite high suspect mild hepatic encephalopathy.  Recommendations were for admission lactulose and GI consultation.  Care was discussed with the hospitalist and was admitted as an inpatient.    Diagnosis:    ICD-10-CM    1. Hepatic encephalopathy  K76.82 lactulose (CEPHULAC) 10 GM packet          Discharge Medications:  New Prescriptions    No medications on file       Scribe Disclosure:  I, Ollie Cyr, am serving as a scribe at 4:23 PM on 10/17/2022 to document services personally performed by Nolan Smith MD based on my observations and the provider's statements to me.              Nolan Smith MD  10/23/22 0813

## 2022-10-17 NOTE — ED NOTES
"Rapid Assessment Note    History:   Mercedes Arita is a 57 year old female who presents with a neurologic problem that began at roughly 1400. The patients significant other reports that Mercedes went to bed normally last night. She then awoke at 0500 and was tremulous and slightly \"foggy.\" The patient's significant other reports this happens occasionally, but is usually resolved with some food and water. He notes that the tremor has been happening for the past 2 months. He notes that Mercedes ate some food and drank some water and then went down for a nap. When she awoke he observed gait instability and increased \"foggyness,\" siting an example where she forgot that she was wearing shoes. Upon examination, the patient was unable to tell us the month and year, and she also stated that thee president was Suhas Amaro.    Exam:   BP (!) 143/85   Pulse 116   Temp 97.6  F (36.4  C) (Temporal)   Resp 20   SpO2 100%   General: Alert, appears well-developed and well-nourished. Cooperative.   HEENT:  Head:  Atraumatic  Ears:  External ears are normal  Mouth/Throat:  Oropharynx is without erythema or exudate and mucous membranes are moist.   Eyes:   Conjunctivae normal and EOM are normal. No scleral icterus.    Pupils are equal, round, and reactive to light.   CV:  Normal rate, regular rhythm, normal heart sounds and radial pulses are 2+ and symmetric.  No murmur.  Resp:  Breath sounds are clear bilaterally    Non-labored, no retractions or accessory muscle use  MS:  Normal range of motion. No edema.  Skin:  Warm and dry.  No rash or lesions noted.  Neuro: Gait normal.  Confused to time and president. Normal strength.  Sensation intact in all 4 extremities. GCS: 14.  Cranial nerves 2-12 intact.  Psych:  Normal mood and affect.    Plan of Care:   I evaluated the patient and developed an initial plan of care. I discussed this plan and explained that I, or one of my partners, would be returning to complete the evaluation.     Patient " is a 57-year-old female who presents with acute confusion starting this morning when waking up.  It seems that her symptoms are progressed this afternoon.  Significant other states that she has been confused and having an abnormal gait at home.  He states she has had intermittent symptoms similar to this in the last several weeks that improved with oral intake.  Glucose greater than 100 on point-of-care glucose in triage.  Patient does seem confused although no focal neurologic symptoms.  Due to ongoing confusion and reports of gait instability will activate a tier 2 stroke and initiate further ED work-up.  Patient sent to bed 19, care transferred to my partner Dr. Smith.  Please see his primary ED provider note.    I, Danny Shazia, am serving as a scribe to document services personally performed by Chevy Wilson MD based on my observations and the provider's statements to me.    10/17/2022  [employer]    Portions of this medical record were completed by a scribe. UPON MY REVIEW AND AUTHENTICATION BY ELECTRONIC SIGNATURE, this confirms (a) I performed the applicable clinical services, and (b) the record is accurate.        Chevy Wilson MD  10/17/22 4631

## 2022-10-18 ENCOUNTER — APPOINTMENT (OUTPATIENT)
Dept: ULTRASOUND IMAGING | Facility: CLINIC | Age: 57
DRG: 442 | End: 2022-10-18
Attending: PHYSICIAN ASSISTANT
Payer: COMMERCIAL

## 2022-10-18 LAB
ALBUMIN SERPL BCG-MCNC: 3.1 G/DL (ref 3.5–5.2)
ALP SERPL-CCNC: 84 U/L (ref 35–104)
ALT SERPL W P-5'-P-CCNC: 37 U/L (ref 10–35)
ANION GAP SERPL CALCULATED.3IONS-SCNC: 12 MMOL/L (ref 7–15)
AST SERPL W P-5'-P-CCNC: 50 U/L (ref 10–35)
BILIRUB SERPL-MCNC: 2.3 MG/DL
BUN SERPL-MCNC: 6.5 MG/DL (ref 6–20)
CALCIUM SERPL-MCNC: 8.7 MG/DL (ref 8.6–10)
CHLORIDE SERPL-SCNC: 110 MMOL/L (ref 98–107)
CREAT SERPL-MCNC: 0.66 MG/DL (ref 0.51–0.95)
DEPRECATED HCO3 PLAS-SCNC: 21 MMOL/L (ref 22–29)
ERYTHROCYTE [DISTWIDTH] IN BLOOD BY AUTOMATED COUNT: 14.8 % (ref 10–15)
GFR SERPL CREATININE-BSD FRML MDRD: >90 ML/MIN/1.73M2
GLUCOSE SERPL-MCNC: 93 MG/DL (ref 70–99)
HCT VFR BLD AUTO: 33.8 % (ref 35–47)
HGB BLD-MCNC: 10.7 G/DL (ref 11.7–15.7)
MCH RBC QN AUTO: 30.9 PG (ref 26.5–33)
MCHC RBC AUTO-ENTMCNC: 31.7 G/DL (ref 31.5–36.5)
MCV RBC AUTO: 98 FL (ref 78–100)
PLATELET # BLD AUTO: 119 10E3/UL (ref 150–450)
POTASSIUM SERPL-SCNC: 3.4 MMOL/L (ref 3.4–5.3)
PROT SERPL-MCNC: 6.6 G/DL (ref 6.4–8.3)
RBC # BLD AUTO: 3.46 10E6/UL (ref 3.8–5.2)
SODIUM SERPL-SCNC: 143 MMOL/L (ref 136–145)
WBC # BLD AUTO: 6 10E3/UL (ref 4–11)

## 2022-10-18 PROCEDURE — 80053 COMPREHEN METABOLIC PANEL: CPT | Performed by: INTERNAL MEDICINE

## 2022-10-18 PROCEDURE — 120N000001 HC R&B MED SURG/OB

## 2022-10-18 PROCEDURE — 36415 COLL VENOUS BLD VENIPUNCTURE: CPT | Performed by: INTERNAL MEDICINE

## 2022-10-18 PROCEDURE — 250N000013 HC RX MED GY IP 250 OP 250 PS 637: Performed by: INTERNAL MEDICINE

## 2022-10-18 PROCEDURE — 85027 COMPLETE CBC AUTOMATED: CPT | Performed by: INTERNAL MEDICINE

## 2022-10-18 PROCEDURE — 258N000003 HC RX IP 258 OP 636: Performed by: INTERNAL MEDICINE

## 2022-10-18 PROCEDURE — 76705 ECHO EXAM OF ABDOMEN: CPT

## 2022-10-18 PROCEDURE — 99233 SBSQ HOSP IP/OBS HIGH 50: CPT | Performed by: HOSPITALIST

## 2022-10-18 RX ADMIN — FAMOTIDINE 20 MG: 20 TABLET ORAL at 09:29

## 2022-10-18 RX ADMIN — FAMOTIDINE 20 MG: 20 TABLET ORAL at 20:52

## 2022-10-18 RX ADMIN — LACTULOSE 20 G: 20 SOLUTION ORAL at 20:51

## 2022-10-18 RX ADMIN — SODIUM CHLORIDE: 900 INJECTION INTRAVENOUS at 02:30

## 2022-10-18 RX ADMIN — LACTULOSE 20 G: 20 SOLUTION ORAL at 11:40

## 2022-10-18 ASSESSMENT — ACTIVITIES OF DAILY LIVING (ADL)
ADLS_ACUITY_SCORE: 22
ADLS_ACUITY_SCORE: 35
ADLS_ACUITY_SCORE: 22

## 2022-10-18 NOTE — CONSULTS
[facility]  Gastroenterology Consultation    Mercedes Arita  [address] [address] [id number]  57 year old female    Admission Date/Time: 10/17/2022  Primary Care Provider: Chevy Fonseca    We were asked to see the patient in consultation by Dr. Marsh for evaluation of hepatic encephalopathy.    HPI:  Mercedes Arita is a 57 year old female who has past medical history of cirrhosis (recent diagnosis- followed by Dr. Chaudhary with [facility]) and Crohn's disease (followed by Dr. Fonseca with [facility]) who was admitted with confusion. Confusion began yesterday, she has never had confusion in the past.     In the ED work up including CT head and CTA head neck were unremarkable. CBC was unremarkable, TSH normal. US showed trace leukocyte esterase. Drugs of abuse screen negative. LFTs showed ALT 41, AST 50, Alk phos 115, tbili 1.9. Today her LFTs are ALT 37, AST 50, alk phos 84, tbili 2.3. She was started on lactulose BID.     Today, the patient notes she is feeling well. No abdominal pain, nausea, vomiting. Is passing stools, no bloody stools or melena noted. Confusion seems to be much improved when talking with the patient, is oriented to person, place, time. No alcohol use reported. No fevers or chills. No cough/shortness of breath. No urinary symptoms.     Patient underwent EGD with [facility] on 6/29/22 for variceal screening. This showed 2 short columns of small varices in distal esophagus, complete flattening with insufflation (no banding), small hiatal hernia, no gastric varices, very slight narrowing in the duodenum at D2 but widely patent. Repeat EGD in 1 year.     ROS: A comprehensive ten point review of systems was negative aside from those in mentioned in the HPI.      MEDICATIONS: No current facility-administered medications on file prior to encounter.  Cholecalciferol (VITAMIN D3) 50 MCG (2000 UT) CAPS, Take by mouth daily  cyanocobalamin 1000 MCG/ML injection, Inject 1 mL as directed  "every 30 days.  Multiple Vitamins-Minerals (MULTIVITAMIN GUMMIES ADULTS PO), Take 1 chew tab by mouth daily  omeprazole (PRILOSEC) 20 MG DR capsule, Take 20 mg by mouth daily        ALLERGIES: No Known Allergies    Past Medical History:   Diagnosis Date     Chronic pain     Abdominal pain     Crohn's     Chrons     Crohn's disease (H)     diagnosed 1983     PONV (postoperative nausea and vomiting)        Past Surgical History:   Procedure Laterality Date     CHOLECYSTECTOMY       DILATE RECTUM  9/19/2012    Procedure: DILATE RECTUM;  Exam Under Anesthesia, Dilation with Stricture, lysis of adhesions, Laparoscopic hand assisted Ileocolic Resection ;  Surgeon: Magy Macias MD;  Location:  OR     GASTROSCOPY N/A 1/18/2018    Procedure: GASTROSCOPY;  GASTROSCOPY WITH balloon DILATION of duodenal stricture up to 13.5mm UNDER FLUOROSCOPY (MAC SEDATION) ;  Surgeon: Aaliyah Langley MD;  Location:  OR     GI SURGERY  1987,1996,2012    bowel resection times 3 ileo-colonic resections     HERNIA REPAIR      Ventral times 2 with mesh.     LAPAROSCOPIC RESECTION ILEOCECAL  9/19/2012    Procedure: LAPAROSCOPIC RESECTION ILEOCECAL;;  Surgeon: Magy Macias MD;  Location:  OR     LAPAROTOMY EXPLORATORY      For removal of adhesions.     MOHS MICROGRAPHIC PROCEDURE      Removal of skin cancer on face.     ORTHOPEDIC SURGERY      carpal tunnel         SOCIAL HISTORY:  Social History     Tobacco Use     Smoking status: Never     Smokeless tobacco: Never   Substance Use Topics     Alcohol use: Yes     Comment: 2 times yearly.     Drug use: No       FAMILY HISTORY:  No family history on file.    PHYSICAL EXAM:   /63 (BP Location: Right arm)   Pulse 102   Temp 99.1  F (37.3  C) (Oral)   Resp 16   Ht 1.575 m (5' 2\")   Wt 69.7 kg (153 lb 11.2 oz)   SpO2 96%   BMI 28.11 kg/m      Constitutional: NAD, comfortable  Cardiovascular: RRR, normal S1 and S2, no r/c/g/m  Respiratory: CTAB  Psychiatric: " mentation appears normal and affect normal  Head: Normocephalic. Atraumatic.    Eyes:   No icterus  Abdomen: Non-distended, soft, non-tender, +BS  NEURO: grossly negative, no asterixis   SKIN: no suspicious lesions or rashes, no jaundice    ADDITIONAL COMMENTS:   I reviewed the patient's new clinical lab test results.   Recent Labs   Lab Test 10/18/22  0625 10/17/22  1700 09/28/22  1452 03/25/22  0806 02/23/22  1413 01/12/22  0734   WBC 6.0 6.2 5.9 4.3  4.3 5.1  --    HGB 10.7* 12.6 12.2 13.0  13.2 12.6  --    MCV 98 99 101* 100  100 100  --    * 146* 132* 132*  128* 119*  --    INR  --   --   --  1.36* 1.35* 1.28*     Recent Labs   Lab Test 10/18/22  0625 10/17/22  1700 10/17/22  1623 09/28/22  1452    139  --  141   POTASSIUM 3.4 3.4  --  4.0   CHLORIDE 110* 106  --  108*   CO2 21* 18*  --  21*   BUN 6.5 10.6  --  7.7   CR 0.66 0.65  --  0.63   ANIONGAP 12 15  --  12   TRAVIS 8.7 9.3  --  9.1   GLC 93 128* 115* 90     Recent Labs   Lab Test 10/18/22  0625 10/17/22  1743 10/17/22  1700 09/28/22  1452 03/25/22  0806 01/06/20  0000 01/18/18  0719 01/17/18  1826 10/20/17  1819 05/31/16  0000 02/03/16  0000   ALBUMIN 3.1*  --  3.8 3.6 3.2*   < > 3.1* 3.7 3.6  --  3.6   BILITOTAL 2.3*  --  1.9* 1.4* 1.9*   < > 0.6 0.5 0.6  --  0.2   DBIL  --   --   --   --  0.6*  --  <0.1  --   --   --  0.1   ALT 37*  --  41* 34 42   < > 70* 82* 101*   < > 69   AST 50*  --  50* 44* 44   < > 55* 50* 51*   < > 37   ALKPHOS 84  --  115* 119* 95   < > 67 86 82  --  92   PROTEIN  --  Negative  --   --   --   --   --   --   --   --   --    LIPASE  --   --   --   --   --   --   --  140 225  --   --     < > = values in this interval not displayed.      CONSULTATION ASSESSMENT AND PLAN:    Principal Problem:    Hepatic encephalopathy    Assessment: This 57 year old female with history of Crohn's disease (followed by Dr. Fonseca at [facility]) and recently diagnosed cirrhosis (followed by Dr. Chaudhary at [facility]) comes in with new confusion.  Work up including CT head, CTA head/neck, CBC, TSH, UA, drugs of abuse screen all unremarkable. LFTs mildly elevated: today ALT 37, AST 50, alk phos 84, tbili 2.3. Was started on lactulose BID with improvement of confusion, is now oriented to person, place, time. No asterixis on exam. New encephalopathy could be a result of infection such as SBP or other (however no UTI on exam, no symptoms of infection elsewhere). Will add on RUQ ultrasound to evaluate for ascites, would do diagnostic para if ascites present.       Plan:  -- RUQ ultrasound looking for ascites, would do diagnostic paracentesis if present.   -- Continue lactulose BID, titrate for 3 bowel movements daily.   -- If needed, could add rifaximin.  -- Liver clinic follow up ([facility] will call to schedule).     I discussed the patient's findings and plan with Dr. Puente.    Approximately 40 minutes total time was spent providing patient care, including patient evaluation, reviewing documentation/test results, and .         Cecy Tucker, JESUSITA  [facility]  Office:  [phone] call if needed after 5PM  Cell:  [phone], not available after 5PM at this number

## 2022-10-18 NOTE — ED NOTES
Pt presents to door w/ blood all over her, blood on floor, and naked. RN walked pt back into room, called charge RN for sitter, cleaned up blood on pt, blood on floor, gave pt new bedding. Pt had pulled out IV. RN put new IV in and new tubing for fluids. RN instructed pt to use call light if she needs assistance or to use the bathroom. RN asked charge RN for sitter, TBD. Will continue to monitor.

## 2022-10-18 NOTE — PROGRESS NOTES
[facility]    Hospitalist Progress Note  Name: Mercedes Arita    MRN: [id number]  Provider:  Nick Giraldo DO, MPH  Date of Service: 10/18/2022    Summary of Stay: Mercedes Arita is a 57 year old female with a history of nonalcoholic steatohepatitis/cirrhosis, Crohn's disease, bowel resection, duodenal stricture admitted on 10/17/2022 with hepatic encephalopathy.    Problem List:   1. Hepatic encephalopathy: It appears as though this is the first episode of hepatic encephalopathy despite her history of nonalcoholic steatohepatitis/cirrhosis.  Ammonia level was elevated to 103.  No other obvious explanations for her confusion.  Code stroke was called in the emergency department and head CT and head/neck CT angiogram were unremarkable.  No evidence of active infection either.  For now we will continue scheduled lactulose 10 to 20 g oral twice daily to 2-3 bowel movements daily.  She will likely require this medication on discharge.  May also benefit from rifaximin.  She is having bowel movements and her mental status does appear to be improving although she is still somewhat disoriented and confused.  GI consulted for further recommendations.  2. Nonalcoholic steatohepatitis and cirrhosis: Follows with gastroenterology as an outpatient.  GI consult appreciated.  Plan to perform right upper quadrant ultrasound to evaluate for ascites and recommendation for diagnostic paracentesis if ascites present.  Follow-up in the liver clinic as an outpatient.  LFTs fairly stable but bilirubin 2.3 up from 1.9 yesterday.  Recheck LFTs tomorrow.  3. Acute anemia: Hemoglobin down to 10.7 from 12.6.  Likely delusional.  Stop IV fluids.  No reports of bleeding.  4. History of Crohn disease status post previous bowel resections and duodenal stricture.    DVT Prophylaxis: Pneumatic Compression Devices  Code Status: Full Code  Diet: Combination Diet Regular Diet Adult    Rueda Catheter: Not present  Disposition: Expected  discharge in 1-2 days to home. Goals prior to discharge include manage confusion and RUQ US.   Incidental Findings: None.  Family updated today: No.     Interval History   Assumed care from previous hospitalist. The history was fully reviewed.  The patient reports doing well but still confused. No chest pain or shortness of breath. No nausea, vomiting, diarrhea, constipation. No fevers. No other specific complaints identified.     -Data reviewed today: I personally reviewed all new labs and imaging results over the last 24 hours.     Physical Exam   Temp: 98.8  F (37.1  C) Temp src: Oral BP: 109/48 Pulse: 94   Resp: 16 SpO2: 97 % O2 Device: None (Room air)    Vitals:    10/18/22 0100   Weight: 69.7 kg (153 lb 11.2 oz)     Vital Signs with Ranges  Temp:  [97.6  F (36.4  C)-99.1  F (37.3  C)] 98.8  F (37.1  C)  Pulse:  [] 94  Resp:  [11-24] 16  BP: (109-146)/(48-86) 109/48  SpO2:  [90 %-100 %] 97 %  No intake/output data recorded.    GENERAL: No apparent distress. Awake, alert, and somewhat oriented but still confused.  HEENT: Normocephalic, atraumatic. Extraocular movements intact.  CARDIOVASCULAR: Regular rate and rhythm without murmurs or rubs. No S3.  PULMONARY: Clear bilaterally.  GASTROINTESTINAL: Soft, non-tender, non-distended. Bowel sounds normoactive.   EXTREMITIES: No cyanosis or clubbing. Trace edema.  NEUROLOGICAL: CN 2-12 grossly intact, no focal neurological deficits.  DERMATOLOGICAL: No rash, ulcer, bruising, nor jaundice.     Medications     sodium chloride Stopped (10/17/22 2035)     sodium chloride 100 mL/hr at 10/18/22 0230       famotidine  20 mg Oral BID     lactulose  10-20 g Oral BID     sodium chloride (PF)  3 mL Intracatheter Q8H     Data     Laboratory:  Recent Labs   Lab 10/18/22  0625 10/17/22  1700   WBC 6.0 6.2   HGB 10.7* 12.6   HCT 33.8* 39.8   MCV 98 99   * 146*     Recent Labs   Lab 10/18/22  0625 10/17/22  1700 10/17/22  1623    139  --    POTASSIUM 3.4 3.4  --     CHLORIDE 110* 106  --    CO2 21* 18*  --    ANIONGAP 12 15  --    GLC 93 128* 115*   BUN 6.5 10.6  --    CR 0.66 0.65  --    GFRESTIMATED >90 >90  --    TRAVIS 8.7 9.3  --      Recent Labs   Lab 10/18/22  0625 10/17/22  1700   AST 50* 50*   ALT 37* 41*   ALKPHOS 84 115*   BILITOTAL 2.3* 1.9*   ANGELO  --  103*     No results for input(s): CULT in the last 168 hours.    Imaging:  Recent Results (from the past 24 hour(s))   CT Head w/o Contrast    Narrative    EXAM: CT HEAD W/O CONTRAST, CT HEAD PERFUSION W CONTRAST, CTA HEAD NECK W CONTRAST  LOCATION: [facility]  DATE/TIME: 10/17/2022 4:41 PM    INDICATION: Code stroke. Altered mental status. Confusion.  COMPARISON: None.  TECHNIQUE: Head and neck CT angiogram with IV contrast. Noncontrast head CT followed by axial helical CT images of the head and neck vessels obtained during the arterial phase of intravenous contrast administration. Axial 2D reconstructed images and   multiplanar 3D MIP reconstructed images of the head and neck vessels were performed by the technologist. Additional CT cerebral perfusion was performed utilizing a second contrast bolus. Perfusion data were post processed with generation of standard   perfusion maps and estimation of ischemic/infarcted volumes utilizing standard threshold values. Dose reduction techniques were used. All stenosis measurements made according to NASCET criteria unless otherwise specified.  CONTRAST: 50mL Isovue 370 (accession [id number]), 75mL Isovue 370 (accession [id number])  COMPARISON: None.    FINDINGS:   NONCONTRAST HEAD CT:   INTRACRANIAL CONTENTS: No intracranial hemorrhage, extraaxial collection, or mass effect.  No CT evidence of acute infarct. Normal parenchymal attenuation. Normal ventricles and sulci.     VISUALIZED ORBITS/SINUSES/MASTOIDS: No intraorbital abnormality. No paranasal sinus mucosal disease. No middle ear or mastoid effusion.    BONES/SOFT TISSUES: No acute  abnormality.    HEAD CTA:  ANTERIOR CIRCULATION: No stenosis/occlusion, aneurysm, or high flow vascular malformation. Fetal-type origin of left posterior cerebral artery.    POSTERIOR CIRCULATION: No stenosis/occlusion, aneurysm, or high flow vascular malformation. Balanced vertebral arteries supply a normal basilar artery.     DURAL VENOUS SINUSES: Expected enhancement of the major dural venous sinuses.    NECK CTA:  RIGHT CAROTID: No measurable stenosis or dissection.    LEFT CAROTID: No measurable stenosis or dissection.    VERTEBRAL ARTERIES: No focal stenosis or dissection. Balanced vertebral arteries.    AORTIC ARCH: Classic aortic arch anatomy with no significant stenosis at the origin of the great vessels.    NONVASCULAR STRUCTURES: Unremarkable.    CT PERFUSION:  PERFUSION MAPS: Symmetrical cerebral perfusion. No focal deficits in cerebral blood flow or volume to suggest ischemia/oligemia.    RAPID ANALYSIS:  CBF<30%: 0  Tmax>6sec: 0  Mismatch volume: 0  Mismatch ratio: None.      Impression    IMPRESSION:   HEAD CT:  1.  No acute intracranial abnormality.    HEAD CTA:   1.  Normal CTA [place] [facility] [place].    NECK CTA:  1.  Normal neck CTA.    CT PERFUSION:  1.  Normal cerebral perfusion.    2.  Findings called to Dr. Smith in the [facility] emergency room at 5:10 PM on 10/17/2022.   CTA Head Neck w Contrast    Narrative    EXAM: CT HEAD W/O CONTRAST, CT HEAD PERFUSION W CONTRAST, CTA HEAD NECK W CONTRAST  LOCATION: [facility]  DATE/TIME: 10/17/2022 4:41 PM    INDICATION: Code stroke. Altered mental status. Confusion.  COMPARISON: None.  TECHNIQUE: Head and neck CT angiogram with IV contrast. Noncontrast head CT followed by axial helical CT images of the head and neck vessels obtained during the arterial phase of intravenous contrast administration. Axial 2D reconstructed images and   multiplanar 3D MIP reconstructed images of the head and neck vessels were performed by the technologist.  Additional CT cerebral perfusion was performed utilizing a second contrast bolus. Perfusion data were post processed with generation of standard   perfusion maps and estimation of ischemic/infarcted volumes utilizing standard threshold values. Dose reduction techniques were used. All stenosis measurements made according to NASCET criteria unless otherwise specified.  CONTRAST: 50mL Isovue 370 (accession [id number]), 75mL Isovue 370 (accession [id number])  COMPARISON: None.    FINDINGS:   NONCONTRAST HEAD CT:   INTRACRANIAL CONTENTS: No intracranial hemorrhage, extraaxial collection, or mass effect.  No CT evidence of acute infarct. Normal parenchymal attenuation. Normal ventricles and sulci.     VISUALIZED ORBITS/SINUSES/MASTOIDS: No intraorbital abnormality. No paranasal sinus mucosal disease. No middle ear or mastoid effusion.    BONES/SOFT TISSUES: No acute abnormality.    HEAD CTA:  ANTERIOR CIRCULATION: No stenosis/occlusion, aneurysm, or high flow vascular malformation. Fetal-type origin of left posterior cerebral artery.    POSTERIOR CIRCULATION: No stenosis/occlusion, aneurysm, or high flow vascular malformation. Balanced vertebral arteries supply a normal basilar artery.     DURAL VENOUS SINUSES: Expected enhancement of the major dural venous sinuses.    NECK CTA:  RIGHT CAROTID: No measurable stenosis or dissection.    LEFT CAROTID: No measurable stenosis or dissection.    VERTEBRAL ARTERIES: No focal stenosis or dissection. Balanced vertebral arteries.    AORTIC ARCH: Classic aortic arch anatomy with no significant stenosis at the origin of the great vessels.    NONVASCULAR STRUCTURES: Unremarkable.    CT PERFUSION:  PERFUSION MAPS: Symmetrical cerebral perfusion. No focal deficits in cerebral blood flow or volume to suggest ischemia/oligemia.    RAPID ANALYSIS:  CBF<30%: 0  Tmax>6sec: 0  Mismatch volume: 0  Mismatch ratio: None.      Impression    IMPRESSION:   HEAD CT:  1.  No acute intracranial  abnormality.    HEAD CTA:   1.  Normal CTA [place] [facility] [place].    NECK CTA:  1.  Normal neck CTA.    CT PERFUSION:  1.  Normal cerebral perfusion.    2.  Findings called to Dr. Smith in the [facility] emergency room at 5:10 PM on 10/17/2022.   CT Head Perfusion w Contrast    Narrative    EXAM: CT HEAD W/O CONTRAST, CT HEAD PERFUSION W CONTRAST, CTA HEAD NECK W CONTRAST  LOCATION: [facility]  DATE/TIME: 10/17/2022 4:41 PM    INDICATION: Code stroke. Altered mental status. Confusion.  COMPARISON: None.  TECHNIQUE: Head and neck CT angiogram with IV contrast. Noncontrast head CT followed by axial helical CT images of the head and neck vessels obtained during the arterial phase of intravenous contrast administration. Axial 2D reconstructed images and   multiplanar 3D MIP reconstructed images of the head and neck vessels were performed by the technologist. Additional CT cerebral perfusion was performed utilizing a second contrast bolus. Perfusion data were post processed with generation of standard   perfusion maps and estimation of ischemic/infarcted volumes utilizing standard threshold values. Dose reduction techniques were used. All stenosis measurements made according to NASCET criteria unless otherwise specified.  CONTRAST: 50mL Isovue 370 (accession [id number]), 75mL Isovue 370 (accession [id number])  COMPARISON: None.    FINDINGS:   NONCONTRAST HEAD CT:   INTRACRANIAL CONTENTS: No intracranial hemorrhage, extraaxial collection, or mass effect.  No CT evidence of acute infarct. Normal parenchymal attenuation. Normal ventricles and sulci.     VISUALIZED ORBITS/SINUSES/MASTOIDS: No intraorbital abnormality. No paranasal sinus mucosal disease. No middle ear or mastoid effusion.    BONES/SOFT TISSUES: No acute abnormality.    HEAD CTA:  ANTERIOR CIRCULATION: No stenosis/occlusion, aneurysm, or high flow vascular malformation. Fetal-type origin of left posterior cerebral artery.    POSTERIOR  CIRCULATION: No stenosis/occlusion, aneurysm, or high flow vascular malformation. Balanced vertebral arteries supply a normal basilar artery.     DURAL VENOUS SINUSES: Expected enhancement of the major dural venous sinuses.    NECK CTA:  RIGHT CAROTID: No measurable stenosis or dissection.    LEFT CAROTID: No measurable stenosis or dissection.    VERTEBRAL ARTERIES: No focal stenosis or dissection. Balanced vertebral arteries.    AORTIC ARCH: Classic aortic arch anatomy with no significant stenosis at the origin of the great vessels.    NONVASCULAR STRUCTURES: Unremarkable.    CT PERFUSION:  PERFUSION MAPS: Symmetrical cerebral perfusion. No focal deficits in cerebral blood flow or volume to suggest ischemia/oligemia.    RAPID ANALYSIS:  CBF<30%: 0  Tmax>6sec: 0  Mismatch volume: 0  Mismatch ratio: None.      Impression    IMPRESSION:   HEAD CT:  1.  No acute intracranial abnormality.    HEAD CTA:   1.  Normal CTA [place] [facility] [place].    NECK CTA:  1.  Normal neck CTA.    CT PERFUSION:  1.  Normal cerebral perfusion.    2.  Findings called to Dr. Smith in the [facility] emergency room at 5:10 PM on 10/17/2022.         Nick Giraldo DO MPH  [facility] Hospitalist  [address] [place] [address]  10/18/2022

## 2022-10-18 NOTE — PROGRESS NOTES
Pt A&O4, Tele SR,denies pain N/V and dizziness, RA, lung sounds clear, assist 1, GI imaging done today. PCD's are in place. Plan is to continue to keep pt for next 1-2 days. Continue to monitor pt.

## 2022-10-18 NOTE — PLAN OF CARE
"/63 (BP Location: Right arm)   Pulse 102   Temp 99.1  F (37.3  C) (Oral)   Resp 16   Ht 1.575 m (5' 2\")   Wt 69.7 kg (153 lb 11.2 oz)   SpO2 96%   BMI 28.11 kg/m      Pt vss on RA. Orientation varies. Was confused to Date, City, and birthday when arrived to unit. At 0650 ot was only disoriented to date. Neuro done and no deficit noted. NS infusing at 100 ml/hr on L arm. Continue POC.   "

## 2022-10-18 NOTE — PHARMACY-ADMISSION MEDICATION HISTORY
Admission medication history interview status for this patient is complete. See [facility] admission navigator for allergy information, prior to admission medications and immunization status.     Medication history interview done, indicate source(s): Patient and Family  Medication history resources (including written lists, pill bottles, clinic record):None  Pharmacy: -    Changes made to PTA medication list:  Added: -  Changed: -  Reported as Not Taking: -  Removed: biotin, copper, mercaptopurine    Actions taken by pharmacist (provider contacted, etc):None     Additional medication history information:None    Medication reconciliation/reorder completed by provider prior to medication history?  no   (Y/N)     For patients on insulin therapy:   Do you use sliding scale insulin based on blood sugars?   What is your pre-meal insulin coverage?    Do you typically eat three meals a day?   How many times do you check your blood glucose per day?   How many episodes of hypoglycemia do you typically have per month?   Do you have a Continuous Glucose Monitor (CGM)?      Prior to Admission medications    Medication Sig Last Dose Taking? Auth Provider Long Term End Date   Cholecalciferol (VITAMIN D3) 50 MCG (2000 UT) CAPS Take by mouth daily 10/16/2022 Yes Reported, Patient     cyanocobalamin 1000 MCG/ML injection Inject 1 mL as directed every 30 days. Past Month at mid month Yes Reported, Patient     Multiple Vitamins-Minerals (MULTIVITAMIN GUMMIES ADULTS PO) Take 1 chew tab by mouth daily 10/17/2022 Yes Reported, Patient     omeprazole (PRILOSEC) 20 MG DR capsule Take 20 mg by mouth daily 10/17/2022 Yes Reported, Patient

## 2022-10-18 NOTE — H&P
Admitted: 10/17/2022    CHIEF COMPLAINT:  Confusion.    HISTORY OF PRESENT ILLNESS:  The patient is a 57-year-old female with a history of nonalcoholic steatohepatitis/cirrhosis, Crohn's disease, previous bowel resection, who presented to the hospital today for confusion.  Symptoms started acutely this morning.  I obtained history from the patient's significant other who is at bedside in the Emergency Department.  She initially noticed the patient appeared to be confused while working this morning.  The patient normally works from home.  Confusion was very unusual for her and she has never had symptoms like this in the past.  In addition, to the confusion, the patient's significant other also noticed patient had tremors of her hands.  There were no fevers or chills.  No pain.  The patient does not drink any alcohol.  No drug use.  No new medications.  Of note, the patient's significant other reports that she ate a large amount of chavez yesterday, prior to her symptoms developing this morning.      On arrival to the ER, vital signs included blood pressure 143/85 with a heart rate of 116.  No fever.  Saturation 100% on room air.  Workup there included labs and imaging.  Lab work notable for a complete metabolic panel showing AST of 15, ALT of 41 with a bilirubin of 1.9.  Alkaline phosphatase 115.  Bicarbonate slightly low at 18 with a normal anion gap of 15.  Magnesium normal.  Alcohol level is undetectable.  CBC is essentially normal.  TSH normal.  Drugs of abuse screen is negative.  COVID-19 is pending.  Ammonia level is elevated at 103.    Code stroke was called and the patient arrived to the ER and imaging including a head CT scan along with head and neck CT angiogram were unremarkable.    I spoke with Dr. Smith of the ER.  Working diagnosis is hepatic encephalopathy.  She has never had this diagnosis previously.    PAST MEDICAL HISTORY:    1.  Nonalcoholic steatohepatitis/cirrhosis.  The patient follows with  Dr. Chaudhary of [facility].  2.  History of Crohn's disease.  3.  History of duodenal stricture.  4.  Cholecystectomy.  5.  History of bowel resection.    CURRENT MEDICATIONS:  She takes no prescription medications.    ALLERGIES:  NONE.    FAMILY HISTORY:  Reviewed.  Nothing contributory to this admission.    SOCIAL HISTORY:  The patient denies alcohol.  Denies drug use.  Lives with her significant other.    REVIEW OF SYSTEMS:  See HPI for details.  Comprehensive greater than 10-point review of systems is otherwise negative besides that detailed above.  Unclear if the patient has had ascites in the past.    PHYSICAL EXAMINATION:    VITAL SIGNS:  Blood pressure is currently 143/85 with a heart rate near 100 beats per minute.  No fever.  Saturation 100% on room air.  GENERAL:  The patient appears nontoxic and in no acute distress.  She appears awake, alert.  Some confusion present.  Significant other at bedside reports that she looks much better since arrival to the ER.  HEENT:  Head is atraumatic.  Sclerae white.  Eyelids normal.  Conjunctivae are normal.  Extraocular movements are intact.  NECK:  Supple.  No cervical or supraclavicular lymphadenopathy.  CARDIOVASCULAR:  Heart is regular rate and rhythm.  No significant murmurs.  No lower extremity edema.  LUNGS:  Clear to auscultation bilaterally.  No intercostal retractions.  No conversational dyspnea.  ABDOMEN:  Nontender, nondistended bruits.  No masses.  No organomegaly.  EXTREMITIES:  Show no edema.  SKIN:  Reveals no rashes.  No jaundice.  Skin is dry to touch.  NEUROLOGIC:  Cranial nerves II-XII are intact.  Moves all extremities appropriately.  Sensation intact to light touch in the upper and lower extremities bilaterally.  PSYCHIATRIC:  The patient is awake and alert.  Mild confusion present.  No agitation or anxiety.    LABORATORY AND IMAGING DATA:  Reviewed above in HPI.    IMPRESSION AND PLAN:  Ms. Arita is a 57-year-old female with a  history of nonalcoholic steatohepatitis/cirrhosis, Crohn's disease, previous bowel resection, who presented to the hospital for acute onset of confusion.  Vital signs unremarkable on presentation.  Lab work notable for elevated ammonia level greater than 100.  Suspected diagnosis is acute hepatic encephalopathy.  She has never had this previously.  1.  Acute hepatic encephalopathy, related to underlying nonalcoholic steatohepatitis/cirrhosis, new diagnosis.  2.  History of Crohn's disease with previous bowel resections.  3.  History of duodenal stricture.    PLAN:    1.  Admit to inpatient service.  Anticipate greater than 2 midnights in the hospital.  2.  Lactulose 10-20 grams orally twice a day.  Titrate dose to make 2-3 loose bowel movements a day.  I anticipate this will be a new medication for her on discharge.  3.  We will consult GI as this patient is already familiar with Dr. Chaudhary of [facility] who follows this patient in the clinic.  4.  If the patient fails to improve with lactulose would consider abdominal ultrasound to evaluate for ascites and if present, consider a tap to rule out SBP.  However, I am not suspicious for this diagnosis at this point and would only pursue this if she fails to improve as anticipated.  5.  Of note, the patient apparently ate a significant amount of chavez yesterday.  It is possible that this increased protein intake was a precipitating factor for her elevated ammonia levels.  Perhaps GI can discuss any necessary diet recommendations going forward when they see the patient.    Chevy Marsh MD        D: 10/17/2022   T: 10/17/2022   MT: MADALYN    Name:     BEVERLY KNOWLES  MRN:      [id number]        Account:     [id number]   :      1965           Admitted:    10/17/2022       Document: [id number]

## 2022-10-18 NOTE — ED NOTES
"[facility]  ED Nurse Handoff Report    Mercedes Arita is a 57 year old female   ED Chief complaint: Neurologic Problem  . ED Diagnosis:   Final diagnoses:   Hepatic encephalopathy     Allergies: No Known Allergies    Code Status: Full Code  Activity level - Baseline/Home:  Independent. Activity Level - Current:   Assist X 1. Lift room needed: No. Bariatric: No   Needed: No   Isolation: No. Infection: Not Applicable.     Vital Signs:   Vitals:    10/17/22 1614   BP: (!) 143/85   Pulse: 116   Resp: 20   Temp: 97.6  F (36.4  C)   TempSrc: Temporal   SpO2: 100%       Cardiac Rhythm:  ,      Pain level:    Patient confused: Yes. Patient Falls Risk: Yes.   Elimination Status: Has voided   Patient Report - Initial Complaint: Neurologic Problem.   Focused Assessment: The history is provided by the patient and a significant other.      Mercedes Arita is a 57 year old female with history of neuropathy and Crohn disease who presents with neurologic problem. Patient's significant other reports that the patient woke up around 5 in the morning and noticed that the patient was having a shaking episode. Shaking episodes are normal and usually goes away after eating and hydrating. He shares that he noticed the patient's symptoms of confusion around 1400 and started asking her questions, which she were not able to answer such as the date and who the president is. He also shares that there have been changes in the way she walks. Patient states that she has been feeling \"off and cloudy\" since this morning. She denies any headache, chest pain, abdominal pain, and diarrhea. Patient has Crohn's disease but stopped taking her prescribed medication because she \"got tired of it\". Patient is seeing a liver doctor and getting enzymes recently.      Review of Systems   Cardiovascular: Negative for chest pain.   Gastrointestinal: Negative for abdominal pain and diarrhea.   Neurological: Negative for headaches.   All other " systems reviewed and are negative.   Tests Performed: labs, imaging. Abnormal Results:   Labs Ordered and Resulted from Time of ED Arrival to Time of ED Departure   GLUCOSE BY METER - Abnormal       Result Value    GLUCOSE BY METER POCT 115 (*)    COMPREHENSIVE METABOLIC PANEL - Abnormal    Sodium 139      Potassium 3.4      Chloride 106      Carbon Dioxide (CO2) 18 (*)     Anion Gap 15      Urea Nitrogen 10.6      Creatinine 0.65      Calcium 9.3      Glucose 128 (*)     Alkaline Phosphatase 115 (*)     AST 50 (*)     ALT 41 (*)     Protein Total 8.0      Albumin 3.8      Bilirubin Total 1.9 (*)     GFR Estimate >90     AMMONIA - Abnormal    Ammonia 103 (*)    ETHYL ALCOHOL LEVEL - Abnormal    Alcohol ethyl <0.01 (*)    CBC WITH PLATELETS AND DIFFERENTIAL - Abnormal    WBC Count 6.2      RBC Count 4.04      Hemoglobin 12.6      Hematocrit 39.8      MCV 99      MCH 31.2      MCHC 31.7      RDW 14.7      Platelet Count 146 (*)     % Neutrophils 68      % Lymphocytes 23      % Monocytes 6      % Eosinophils 2      % Basophils 1      % Immature Granulocytes 0      NRBCs per 100 WBC 0      Absolute Neutrophils 4.2      Absolute Lymphocytes 1.5      Absolute Monocytes 0.4      Absolute Eosinophils 0.1      Absolute Basophils 0.1      Absolute Immature Granulocytes 0.0      Absolute NRBCs 0.0     MAGNESIUM - Normal    Magnesium 1.8     TSH WITH FREE T4 REFLEX - Normal    TSH 0.69     DRUG ABUSE SCREEN 1 URINE (ED) - Normal    Amphetamines Urine Screen Negative      Barbituates Urine Screen Negative      Benzodiazepine Urine Screen Negative      Cannabinoids Urine Screen Negative      Cocaine Urine Screen Negative      Opiates Urine Screen Negative     ROUTINE UA WITH MICROSCOPIC REFLEX TO CULTURE   COVID-19 VIRUS (CORONAVIRUS) BY PCR     CT Head Perfusion w Contrast   Final Result   IMPRESSION:    HEAD CT:   1.  No acute intracranial abnormality.      HEAD CTA:    1.  Normal CTA [place].      NECK CTA:   1.   Normal neck CTA.      CT PERFUSION:   1.  Normal cerebral perfusion.      2.  Findings called to Dr. Smith in the [facility] emergency room at 5:10 PM on 10/17/2022.      CTA Head Neck w Contrast   Final Result   IMPRESSION:    HEAD CT:   1.  No acute intracranial abnormality.      HEAD CTA:    1.  Normal CTA [place].      NECK CTA:   1.  Normal neck CTA.      CT PERFUSION:   1.  Normal cerebral perfusion.      2.  Findings called to Dr. Smith in the [facility] emergency room at 5:10 PM on 10/17/2022.      CT Head w/o Contrast   Final Result   IMPRESSION:    HEAD CT:   1.  No acute intracranial abnormality.      HEAD CTA:    1.  Normal CTA [place].      NECK CTA:   1.  Normal neck CTA.      CT PERFUSION:   1.  Normal cerebral perfusion.      2.  Findings called to Dr. Smith in the [facility] emergency room at 5:10 PM on 10/17/2022.        .   Treatments provided: See MAR  Family Comments:  in room.   OBS brochure/video discussed/provided to patient:  N/A  ED Medications:   Medications   0.9% sodium chloride BOLUS (1,000 mLs Intravenous New Bag 10/17/22 1737)     Followed by   sodium chloride 0.9% infusion ( Intravenous New Bag 10/17/22 1854)   CT Scan Flush (95 mLs Intravenous Given 10/17/22 1645)   iopamidol (ISOVUE-370) solution 500 mL (120 mLs Intravenous Given 10/17/22 1644)   ondansetron (ZOFRAN) injection 4 mg (4 mg Intravenous Given 10/17/22 1802)   lactulose (CHRONULAC) solution 20 g (20 g Oral Given 10/17/22 1816)     Drips infusing:  Yes  For the majority of the shift, the patient's behavior Green. Interventions performed were NA.    Sepsis treatment initiated: No     Patient tested for COVID 19 prior to admission: YES    ED Nurse Name/Phone Number: Magy Mccormack RN,   7:03 PM    RECEIVING UNIT ED HANDOFF REVIEW    Above ED Nurse Handoff Report was reviewed: Yes  Reviewed by: Damion Hong RN on October 18, 2022 at 12:50 AM

## 2022-10-19 VITALS
SYSTOLIC BLOOD PRESSURE: 138 MMHG | RESPIRATION RATE: 16 BRPM | TEMPERATURE: 97.9 F | HEIGHT: 62 IN | WEIGHT: 153.7 LBS | HEART RATE: 99 BPM | DIASTOLIC BLOOD PRESSURE: 73 MMHG | BODY MASS INDEX: 28.29 KG/M2 | OXYGEN SATURATION: 99 %

## 2022-10-19 LAB
ALBUMIN SERPL BCG-MCNC: 3.6 G/DL (ref 3.5–5.2)
ALP SERPL-CCNC: 101 U/L (ref 35–104)
ALT SERPL W P-5'-P-CCNC: 44 U/L (ref 10–35)
ANION GAP SERPL CALCULATED.3IONS-SCNC: 12 MMOL/L (ref 7–15)
AST SERPL W P-5'-P-CCNC: 73 U/L (ref 10–35)
BILIRUB DIRECT SERPL-MCNC: 0.58 MG/DL (ref 0–0.3)
BILIRUB SERPL-MCNC: 2.8 MG/DL
BUN SERPL-MCNC: 7.7 MG/DL (ref 6–20)
CALCIUM SERPL-MCNC: 9.3 MG/DL (ref 8.6–10)
CHLORIDE SERPL-SCNC: 103 MMOL/L (ref 98–107)
CREAT SERPL-MCNC: 0.85 MG/DL (ref 0.51–0.95)
DEPRECATED HCO3 PLAS-SCNC: 24 MMOL/L (ref 22–29)
ERYTHROCYTE [DISTWIDTH] IN BLOOD BY AUTOMATED COUNT: 14.9 % (ref 10–15)
GFR SERPL CREATININE-BSD FRML MDRD: 80 ML/MIN/1.73M2
GLUCOSE SERPL-MCNC: 120 MG/DL (ref 70–99)
HCT VFR BLD AUTO: 40.5 % (ref 35–47)
HGB BLD-MCNC: 12.9 G/DL (ref 11.7–15.7)
MCH RBC QN AUTO: 31.1 PG (ref 26.5–33)
MCHC RBC AUTO-ENTMCNC: 31.9 G/DL (ref 31.5–36.5)
MCV RBC AUTO: 98 FL (ref 78–100)
PLATELET # BLD AUTO: 174 10E3/UL (ref 150–450)
POTASSIUM SERPL-SCNC: 3.8 MMOL/L (ref 3.4–5.3)
PROT SERPL-MCNC: 8 G/DL (ref 6.4–8.3)
RBC # BLD AUTO: 4.15 10E6/UL (ref 3.8–5.2)
SODIUM SERPL-SCNC: 139 MMOL/L (ref 136–145)
WBC # BLD AUTO: 5.9 10E3/UL (ref 4–11)

## 2022-10-19 PROCEDURE — 82310 ASSAY OF CALCIUM: CPT | Performed by: HOSPITALIST

## 2022-10-19 PROCEDURE — 36415 COLL VENOUS BLD VENIPUNCTURE: CPT | Performed by: HOSPITALIST

## 2022-10-19 PROCEDURE — 82248 BILIRUBIN DIRECT: CPT | Performed by: HOSPITALIST

## 2022-10-19 PROCEDURE — 250N000013 HC RX MED GY IP 250 OP 250 PS 637: Performed by: INTERNAL MEDICINE

## 2022-10-19 PROCEDURE — 85027 COMPLETE CBC AUTOMATED: CPT | Performed by: HOSPITALIST

## 2022-10-19 PROCEDURE — 99239 HOSP IP/OBS DSCHRG MGMT >30: CPT | Performed by: INTERNAL MEDICINE

## 2022-10-19 RX ORDER — LACTULOSE 10 G/15ML
20 SOLUTION ORAL 3 TIMES DAILY
Status: DISCONTINUED | OUTPATIENT
Start: 2022-10-19 | End: 2022-10-19 | Stop reason: HOSPADM

## 2022-10-19 RX ORDER — LACTULOSE 10 G/10G
20 SOLUTION ORAL 2 TIMES DAILY
Qty: 120 PACKET | Refills: 0 | Status: SHIPPED | OUTPATIENT
Start: 2022-10-19 | End: 2024-04-22

## 2022-10-19 RX ADMIN — LACTULOSE 20 G: 20 SOLUTION ORAL at 09:19

## 2022-10-19 RX ADMIN — FAMOTIDINE 20 MG: 20 TABLET ORAL at 08:44

## 2022-10-19 ASSESSMENT — ACTIVITIES OF DAILY LIVING (ADL)
ADLS_ACUITY_SCORE: 22

## 2022-10-19 NOTE — PROGRESS NOTES
Pt is alert and oriented, lung sounds clear, up with assist of one/ gait,tolerating regular diet, passing gas.x1 stools today.Continues on Lactulose, denies pain.Voiding adequate amounts in the bathroom.Possible Tap depending on US the results. Discharge TBD.

## 2022-10-19 NOTE — DISCHARGE SUMMARY
[facility]  Discharge Summary  Name: Mercedes Arita    MRN: [id number]  YOB: 1965    Age: 57 year old  Date of Discharge:  10/19/2022  3:26 PM  Date of Admission: 10/17/2022  Primary Care Provider: Chevy Fonseca  Discharge Physician:  Darshan Mendez MD  Discharging Service:  Hospitalist      Discharge Diagnoses:  Hepatic encephalopathy  Nonalcoholic steatosis and cirrhosis  Acute anemia  History of Crohn's disease  History of duodenal stricture     Hospital Course:  Summary of Stay: Mercedes Arita is a 57 year old female with a history of nonalcoholic steatohepatitis/cirrhosis, Crohn's disease, bowel resection, duodenal stricture admitted on 10/17/2022 with hepatic encephalopathy.  Initial code stroke called in the ER due to confusion with unremarkable noncontrast head CT and CTA head and neck.  Vital signs stable and afebrile.  Ammonia level elevated to 103 and asterixis present on exam.  GI was consulted.  Started on lactulose and after multiple bowel movements mental status return to baseline.  Abdominal ultrasound did not show any ascites.  Discharging on lactulose with follow-up with GI in clinic.     Problem List:   1. Hepatic encephalopathy: It appears as though this is the first episode of hepatic encephalopathy despite her history of nonalcoholic steatohepatitis/cirrhosis.  Ammonia level was elevated to 103.  No other obvious explanations for her confusion.  Code stroke was called in the emergency department and head CT and head/neck CT angiogram were unremarkable.  No evidence of active infection either.    Mental status back to baseline after multiple bowel movements.  Discussed with the patient and her significant other the importance of continue with lactulose with goal of 2-3 bowel movements per day, but medication dosing will likely need to be titrated quite a bit.  Initially starting with twice daily dosing.  She will have follow-up in [place] [facility].  If  "having ongoing issues rifaximin could be added.  Abdominal ultrasound was done while here, but there was no ascites present.  2. Nonalcoholic steatohepatitis and cirrhosis: Follows with gastroenterology as an outpatient.  GI consult appreciated.  LFTs fairly stable but bilirubin 2.3 up from 1.9 on presentation.   No ascites on abdominal ultrasound.  3. Acute anemia: Hemoglobin down to 10.7 from 12.6.  Improved with stopping IV fluids.  4. History of Crohn disease status post previous bowel resections and duodenal stricture.     Discharge Disposition:  Discharged to home     Allergies:  No Known Allergies     Discharge Medications:   Discharge Medication List as of 10/19/2022  2:43 PM      START taking these medications    Details   lactulose (CEPHULAC) 10 GM packet Take 2 packets (20 g) by mouth 2 times daily Titrate doses with goal of 3-4 bowel movements per day, Disp-120 packet, R-0, E-Prescribe         CONTINUE these medications which have NOT CHANGED    Details   Cholecalciferol (VITAMIN D3) 50 MCG (2000 UT) CAPS Take by mouth daily, Historical      cyanocobalamin 1000 MCG/ML injection Inject 1 mL as directed every 30 days., Historical      Multiple Vitamins-Minerals (MULTIVITAMIN GUMMIES ADULTS PO) Take 1 chew tab by mouth daily, Historical      omeprazole (PRILOSEC) 20 MG DR capsule Take 20 mg by mouth daily, Historical              Condition on Discharge:  Discharge condition: Stable   Discharge vitals: Blood pressure 138/73, pulse 99, temperature 97.9  F (36.6  C), temperature source Oral, resp. rate 16, height 1.575 m (5' 2\"), weight 69.7 kg (153 lb 11.2 oz), SpO2 99 %, not currently breastfeeding.   Code status on discharge: Full Code     History of Illness:  See detailed admission note for full details.    Physical Exam:  Blood pressure 138/73, pulse 99, temperature 97.9  F (36.6  C), temperature source Oral, resp. rate 16, height 1.575 m (5' 2\"), weight 69.7 kg (153 lb 11.2 oz), SpO2 99 %, not currently " breastfeeding.  Wt Readings from Last 1 Encounters:   10/18/22 69.7 kg (153 lb 11.2 oz)     Constitutional: Awake, NAD   Eyes: sclera white   HEENT: atraumatic, MMM  Respiratory: no respiratory distress, lungs cta bilaterally, no crackles or wheeze  Cardiovascular: RRR.  No murmur  GI: non-tender, not distended, bowel sounds present  Skin: no rash or lesions, acyanotic  Musculoskeletal/extremities: atraumatic, no major deformities. No edema  Neurologic: A&Ox3, speech clear, did have slight asterixis  Psychiatric: calm, cooperative, normal affect    Procedures other than Imaging:  None     Imaging:  Results for orders placed or performed during the hospital encounter of 10/17/22   CTA Head Neck w Contrast    Narrative    EXAM: CT HEAD W/O CONTRAST, CT HEAD PERFUSION W CONTRAST, CTA HEAD NECK W CONTRAST  LOCATION: [facility]  DATE/TIME: 10/17/2022 4:41 PM    INDICATION: Code stroke. Altered mental status. Confusion.  COMPARISON: None.  TECHNIQUE: Head and neck CT angiogram with IV contrast. Noncontrast head CT followed by axial helical CT images of the head and neck vessels obtained during the arterial phase of intravenous contrast administration. Axial 2D reconstructed images and   multiplanar 3D MIP reconstructed images of the head and neck vessels were performed by the technologist. Additional CT cerebral perfusion was performed utilizing a second contrast bolus. Perfusion data were post processed with generation of standard   perfusion maps and estimation of ischemic/infarcted volumes utilizing standard threshold values. Dose reduction techniques were used. All stenosis measurements made according to NASCET criteria unless otherwise specified.  CONTRAST: 50mL Isovue 370 (accession [id number]), 75mL Isovue 370 (accession [id number])  COMPARISON: None.    FINDINGS:   NONCONTRAST HEAD CT:   INTRACRANIAL CONTENTS: No intracranial hemorrhage, extraaxial collection, or mass effect.  No CT evidence  of acute infarct. Normal parenchymal attenuation. Normal ventricles and sulci.     VISUALIZED ORBITS/SINUSES/MASTOIDS: No intraorbital abnormality. No paranasal sinus mucosal disease. No middle ear or mastoid effusion.    BONES/SOFT TISSUES: No acute abnormality.    HEAD CTA:  ANTERIOR CIRCULATION: No stenosis/occlusion, aneurysm, or high flow vascular malformation. Fetal-type origin of left posterior cerebral artery.    POSTERIOR CIRCULATION: No stenosis/occlusion, aneurysm, or high flow vascular malformation. Balanced vertebral arteries supply a normal basilar artery.     DURAL VENOUS SINUSES: Expected enhancement of the major dural venous sinuses.    NECK CTA:  RIGHT CAROTID: No measurable stenosis or dissection.    LEFT CAROTID: No measurable stenosis or dissection.    VERTEBRAL ARTERIES: No focal stenosis or dissection. Balanced vertebral arteries.    AORTIC ARCH: Classic aortic arch anatomy with no significant stenosis at the origin of the great vessels.    NONVASCULAR STRUCTURES: Unremarkable.    CT PERFUSION:  PERFUSION MAPS: Symmetrical cerebral perfusion. No focal deficits in cerebral blood flow or volume to suggest ischemia/oligemia.    RAPID ANALYSIS:  CBF<30%: 0  Tmax>6sec: 0  Mismatch volume: 0  Mismatch ratio: None.      Impression    IMPRESSION:   HEAD CT:  1.  No acute intracranial abnormality.    HEAD CTA:   1.  Normal CTA [place] [facility] [place].    NECK CTA:  1.  Normal neck CTA.    CT PERFUSION:  1.  Normal cerebral perfusion.    2.  Findings called to Dr. Smith in the [facility] emergency room at 5:10 PM on 10/17/2022.   CT Head w/o Contrast    Narrative    EXAM: CT HEAD W/O CONTRAST, CT HEAD PERFUSION W CONTRAST, CTA HEAD NECK W CONTRAST  LOCATION: [facility]  DATE/TIME: 10/17/2022 4:41 PM    INDICATION: Code stroke. Altered mental status. Confusion.  COMPARISON: None.  TECHNIQUE: Head and neck CT angiogram with IV contrast. Noncontrast head CT followed by axial helical CT images  of the head and neck vessels obtained during the arterial phase of intravenous contrast administration. Axial 2D reconstructed images and   multiplanar 3D MIP reconstructed images of the head and neck vessels were performed by the technologist. Additional CT cerebral perfusion was performed utilizing a second contrast bolus. Perfusion data were post processed with generation of standard   perfusion maps and estimation of ischemic/infarcted volumes utilizing standard threshold values. Dose reduction techniques were used. All stenosis measurements made according to NASCET criteria unless otherwise specified.  CONTRAST: 50mL Isovue 370 (accession [id number]), 75mL Isovue 370 (accession [id number])  COMPARISON: None.    FINDINGS:   NONCONTRAST HEAD CT:   INTRACRANIAL CONTENTS: No intracranial hemorrhage, extraaxial collection, or mass effect.  No CT evidence of acute infarct. Normal parenchymal attenuation. Normal ventricles and sulci.     VISUALIZED ORBITS/SINUSES/MASTOIDS: No intraorbital abnormality. No paranasal sinus mucosal disease. No middle ear or mastoid effusion.    BONES/SOFT TISSUES: No acute abnormality.    HEAD CTA:  ANTERIOR CIRCULATION: No stenosis/occlusion, aneurysm, or high flow vascular malformation. Fetal-type origin of left posterior cerebral artery.    POSTERIOR CIRCULATION: No stenosis/occlusion, aneurysm, or high flow vascular malformation. Balanced vertebral arteries supply a normal basilar artery.     DURAL VENOUS SINUSES: Expected enhancement of the major dural venous sinuses.    NECK CTA:  RIGHT CAROTID: No measurable stenosis or dissection.    LEFT CAROTID: No measurable stenosis or dissection.    VERTEBRAL ARTERIES: No focal stenosis or dissection. Balanced vertebral arteries.    AORTIC ARCH: Classic aortic arch anatomy with no significant stenosis at the origin of the great vessels.    NONVASCULAR STRUCTURES: Unremarkable.    CT PERFUSION:  PERFUSION MAPS: Symmetrical cerebral perfusion. No  focal deficits in cerebral blood flow or volume to suggest ischemia/oligemia.    RAPID ANALYSIS:  CBF<30%: 0  Tmax>6sec: 0  Mismatch volume: 0  Mismatch ratio: None.      Impression    IMPRESSION:   HEAD CT:  1.  No acute intracranial abnormality.    HEAD CTA:   1.  Normal CTA [place] [facility] [place].    NECK CTA:  1.  Normal neck CTA.    CT PERFUSION:  1.  Normal cerebral perfusion.    2.  Findings called to Dr. Smith in the [facility] emergency room at 5:10 PM on 10/17/2022.   CT Head Perfusion w Contrast    Narrative    EXAM: CT HEAD W/O CONTRAST, CT HEAD PERFUSION W CONTRAST, CTA HEAD NECK W CONTRAST  LOCATION: [facility]  DATE/TIME: 10/17/2022 4:41 PM    INDICATION: Code stroke. Altered mental status. Confusion.  COMPARISON: None.  TECHNIQUE: Head and neck CT angiogram with IV contrast. Noncontrast head CT followed by axial helical CT images of the head and neck vessels obtained during the arterial phase of intravenous contrast administration. Axial 2D reconstructed images and   multiplanar 3D MIP reconstructed images of the head and neck vessels were performed by the technologist. Additional CT cerebral perfusion was performed utilizing a second contrast bolus. Perfusion data were post processed with generation of standard   perfusion maps and estimation of ischemic/infarcted volumes utilizing standard threshold values. Dose reduction techniques were used. All stenosis measurements made according to NASCET criteria unless otherwise specified.  CONTRAST: 50mL Isovue 370 (accession [id number]), 75mL Isovue 370 (accession [id number])  COMPARISON: None.    FINDINGS:   NONCONTRAST HEAD CT:   INTRACRANIAL CONTENTS: No intracranial hemorrhage, extraaxial collection, or mass effect.  No CT evidence of acute infarct. Normal parenchymal attenuation. Normal ventricles and sulci.     VISUALIZED ORBITS/SINUSES/MASTOIDS: No intraorbital abnormality. No paranasal sinus mucosal disease. No middle ear or mastoid  effusion.    BONES/SOFT TISSUES: No acute abnormality.    HEAD CTA:  ANTERIOR CIRCULATION: No stenosis/occlusion, aneurysm, or high flow vascular malformation. Fetal-type origin of left posterior cerebral artery.    POSTERIOR CIRCULATION: No stenosis/occlusion, aneurysm, or high flow vascular malformation. Balanced vertebral arteries supply a normal basilar artery.     DURAL VENOUS SINUSES: Expected enhancement of the major dural venous sinuses.    NECK CTA:  RIGHT CAROTID: No measurable stenosis or dissection.    LEFT CAROTID: No measurable stenosis or dissection.    VERTEBRAL ARTERIES: No focal stenosis or dissection. Balanced vertebral arteries.    AORTIC ARCH: Classic aortic arch anatomy with no significant stenosis at the origin of the great vessels.    NONVASCULAR STRUCTURES: Unremarkable.    CT PERFUSION:  PERFUSION MAPS: Symmetrical cerebral perfusion. No focal deficits in cerebral blood flow or volume to suggest ischemia/oligemia.    RAPID ANALYSIS:  CBF<30%: 0  Tmax>6sec: 0  Mismatch volume: 0  Mismatch ratio: None.      Impression    IMPRESSION:   HEAD CT:  1.  No acute intracranial abnormality.    HEAD CTA:   1.  Normal CTA [place] [facility] [place].    NECK CTA:  1.  Normal neck CTA.    CT PERFUSION:  1.  Normal cerebral perfusion.    2.  Findings called to Dr. Smith in the [facility] emergency room at 5:10 PM on 10/17/2022.   US Abdomen Limited    Narrative    US ABDOMEN LIMITED 10/18/2022 10:12 AM    CLINICAL HISTORY: Hepatic encephalopathy. Evaluate for ascites.  TECHNIQUE: Limited abdominal ultrasound.  COMPARISON: 5/11/2022.    FINDINGS: Ultrasound scanning through all 4 quadrants of the abdomen  demonstrates no evidence for ascites.      Impression    IMPRESSION:  No ascites.    NICCI MCFADDEN MD         SYSTEM ID:  [id number]        Consultations:  Consultation during this admission received from gastroenterology.       Recent Lab Results:  Recent Labs   Lab 10/19/22  0740 10/18/22  0625 10/17/22  1700    WBC 5.9 6.0 6.2   HGB 12.9 10.7* 12.6   HCT 40.5 33.8* 39.8   MCV 98 98 99    119* 146*     Recent Labs   Lab 10/19/22  0740 10/18/22  0625 10/17/22  1700    143 139   POTASSIUM 3.8 3.4 3.4   CHLORIDE 103 110* 106   CO2 24 21* 18*   ANIONGAP 12 12 15   * 93 128*   BUN 7.7 6.5 10.6   CR 0.85 0.66 0.65   GFRESTIMATED 80 >90 >90   TRAVIS 9.3 8.7 9.3   MAG  --   --  1.8   PROTTOTAL 8.0 6.6 8.0   ALBUMIN 3.6 3.1* 3.8   BILITOTAL 2.8* 2.3* 1.9*   ALKPHOS 101 84 115*   AST 73* 50* 50*   ALT 44* 37* 41*     Recent Labs   Lab 10/17/22  1743   COLOR Light Yellow   APPEARANCE Clear   URINEGLC Negative   URINEBILI Negative   URINEKETONE Negative   SG 1.010   UBLD Negative   URINEPH 5.5   PROTEIN Negative   NITRITE Negative   LEUKEST Trace*   RBCU <1   WBCU 2   UDS negative  COVID-19 PCR negative  Alcohol level negative       Pending Results:    Unresulted Labs Ordered in the Past 30 Days of this Admission     No orders found from 9/17/2022 to 10/18/2022.         These results will be followed up by patient's primary care provider.    Discharge Instructions and Follow-Up:   Discharge Procedure Orders   Reason for your hospital stay   Order Comments: You were hospitalized for confusion that is secondary to liver failure called hepatic encephalopathy.  The way to treat this is by having multiple bowel movements per day with a medication called lactulose to prevent toxins building up that are filtered by the liver.  The goal is to have 3-4 bowel movements per day and you will need to adjust your lactulose dosing to achieve this.  Initially starting with taking the medication twice per day and then adjust as needed to achieve the goal.  You will have follow-up in [place] [facility].     Follow-up and recommended labs and tests    Order Comments: [place] [facility] will call you to set up a follow-up appointment     Activity   Order Comments: Your activity upon discharge: activity as tolerated     Order Specific  Question Answer Comments   Is discharge order? Yes      Full Code     Order Specific Question Answer Comments   Code status determined by: Discussion with patient/ legal decision maker      Diet   Order Comments: Follow this diet upon discharge: Orders Placed This Encounter      Combination Diet Regular Diet Adult     Order Specific Question Answer Comments   Is discharge order? Yes          IDarshan MD, personally saw the patient today and spent greater than 30 minutes discharging this patient.    Darshan Mendez MD

## 2022-10-19 NOTE — PLAN OF CARE
Reviewed discharge instructions and medications with patient and  Questions answered. Patient discharged to home with discharge instructions, medications, and belongings.       Goal Outcome Evaluation:      Plan of Care Reviewed With: patient, spouse

## 2022-10-19 NOTE — PROGRESS NOTES
"[facility]  [facility]  Gastroenterology Progress note    Interval History:    Feeling well. States she is back to baseline mentation. No abdominal pain, nausea, vomiting. 2-3 bowel movements daily with lactulose. No evidence of bleeding.     Vital Signs:      /73 (BP Location: Right arm)   Pulse 99   Temp 97.9  F (36.6  C) (Oral)   Resp 16   Ht 1.575 m (5' 2\")   Wt 69.7 kg (153 lb 11.2 oz)   SpO2 99%   BMI 28.11 kg/m    Temp (24hrs), Av.2  F (36.8  C), Min:97.9  F (36.6  C), Max:98.9  F (37.2  C)    Patient Vitals for the past 72 hrs:   Weight   10/18/22 0100 69.7 kg (153 lb 11.2 oz)     No intake or output data in the 24 hours ending 10/19/22 0948      Constitutional: NAD, comfortable  Cardiovascular: RRR, normal S1, S2   Respiratory: CTAB  Abdomen: soft, non-tender, nondistended, +BS  Neuro: no asterixis    Additional Comments:  ROS, FH, SH: See initial GI consult for details.    Laboratory Data:  Recent Labs   Lab Test 10/19/22  0740 10/18/22  0625 10/17/22  1700 22  1452 22  0806 22  1413 22  0734   WBC 5.9 6.0 6.2   < > 4.3  4.3 5.1  --    HGB 12.9 10.7* 12.6   < > 13.0  13.2 12.6  --    MCV 98 98 99   < > 100  100 100  --     119* 146*   < > 132*  128* 119*  --    INR  --   --   --   --  1.36* 1.35* 1.28*    < > = values in this interval not displayed.     Recent Labs   Lab Test 10/19/22  0740 10/18/22  0625 10/17/22  1700    143 139   POTASSIUM 3.8 3.4 3.4   CHLORIDE 103 110* 106   CO2 24 21* 18*   BUN 7.7 6.5 10.6   CR 0.85 0.66 0.65   ANIONGAP 12 12 15   TRAVIS 9.3 8.7 9.3     Recent Labs   Lab Test 10/19/22  0740 10/18/22  0625 10/17/22  1743 10/17/22  1700 22  1452 22  0806 20  0000 18  0719 18  1826 10/20/17  1819   ALBUMIN 3.6 3.1*  --  3.8   < > 3.2*   < > 3.1* 3.7 3.6   BILITOTAL 2.8* 2.3*  --  1.9*   < > 1.9*   < > 0.6 0.5 0.6   DBIL 0.58*  --   --   --   --  0.6*  --  <0.1  " --   --    ALT 44* 37*  --  41*   < > 42   < > 70* 82* 101*   AST 73* 50*  --  50*   < > 44   < > 55* 50* 51*   ALKPHOS 101 84  --  115*   < > 95   < > 67 86 82   PROTEIN  --   --  Negative  --   --   --   --   --   --   --    LIPASE  --   --   --   --   --   --   --   --  140 225    < > = values in this interval not displayed.     US RUQ 10/18/22:  IMPRESSION:  No ascites.    CONSULTATION ASSESSMENT AND PLAN:    Principal Problem:    Hepatic encephalopathy    Assessment: This 57 year old female with history of Crohn's disease (followed by Dr. Fonseca at [facility]) and recently diagnosed cirrhosis (followed by Dr. Chaudhary at [facility]) comes in with new confusion. Work up including CT head, CTA head/neck, CBC, TSH, UA, drugs of abuse screen all unremarkable. LFTs mildly elevated: today ALT 37, AST 50, alk phos 84, tbili 2.3. Was started on lactulose BID with improvement of confusion, is now oriented to person, place, time. New encephalopathy could be a result of infection such as SBP or other (however no UTI on exam, no symptoms of infection elsewhere). Will add on RUQ ultrasound to evaluate for ascites, would do diagnostic para if ascites present.     RUQ U/S did not show any ascites.       Plan:   -- Continue lactulose BID, titrate for 3 bowel movements daily. If needed, can add rifaximin but seems to have good result with lactulose.   -- Liver clinic follow up ([facility] will call to schedule).   -- GI will sign off, please call for any questions.       Will discuss with Dr. Puente.    Cecy Tucker PA-C  [facility]  Cell: [phone] until 12PM  Office: [phone]

## 2022-10-20 ENCOUNTER — PATIENT OUTREACH (OUTPATIENT)
Dept: CARE COORDINATION | Facility: CLINIC | Age: 57
End: 2022-10-20

## 2022-10-20 NOTE — PROGRESS NOTES
Clinic Care Coordination Contact  [facility]: Post-Discharge Note  SITUATION                                                      Admission:    Admission Date: 10/17/22   Reason for Admission: Hepatic encephalopathy  Discharge:   Discharge Date: 10/19/22  Discharge Diagnosis: Hepatic encephalopathy  Nonalcoholic steatosis and cirrhosis  Acute anemia    BACKGROUND                                                      Per hospital discharge summary and inpatient provider notes:    The patient is a 57-year-old female with a history of nonalcoholic steatohepatitis/cirrhosis, Crohn's disease, previous bowel resection, who presented to the hospital today for confusion.  Symptoms started acutely this morning.  I obtained history from the patient's significant other who is at bedside in the Emergency Department.  She initially noticed the patient appeared to be confused while working this morning.  The patient normally works from home.  Confusion was very unusual for her and she has never had symptoms like this in the past.  In addition, to the confusion, the patient's significant other also noticed patient had tremors of her hands.  There were no fevers or chills.  No pain.  The patient does not drink any alcohol.  No drug use.  No new medications.  Of note, the patient's significant other reports that she ate a large amount of chavez yesterday, prior to her symptoms developing this morning.       On arrival to the ER, vital signs included blood pressure 143/85 with a heart rate of 116.  No fever.  Saturation 100% on room air.  Workup there included labs and imaging.  Lab work notable for a complete metabolic panel showing AST of 15, ALT of 41 with a bilirubin of 1.9.  Alkaline phosphatase 115.  Bicarbonate slightly low at 18 with a normal anion gap of 15.  Magnesium normal.  Alcohol level is undetectable.  CBC is essentially normal.  TSH normal.  Drugs of abuse screen is negative.  COVID-19 is pending.  Ammonia level  is elevated at 103.     Code stroke was called and the patient arrived to the ER and imaging including a head CT scan along with head and neck CT angiogram were unremarkable.     I spoke with Dr. Smith of the ER.  Working diagnosis is hepatic encephalopathy.  She has never had this diagnosis previously.    ASSESSMENT           Discharge Assessment  How are you doing now that you are home?: really good  How are your symptoms? (Red Flag symptoms escalate to triage hotline per guidelines): Improved  Do you feel your condition is stable enough to be safe at home until your provider visit?: Yes  Does the patient have their discharge instructions? : Yes  Does the patient have questions regarding their discharge instructions? : No  Were you started on any new medications or were there changes to any of your previous medications? : Yes  Does the patient have all of their medications?: Yes  Do you have questions regarding any of your medications? : No  Do you have all of your needed medical supplies or equipment (DME)?  (i.e. oxygen tank, CPAP, cane, etc.): Yes  Discharge follow-up appointment scheduled within 14 calendar days? : Yes                  PLAN                                                      Outpatient Plan:    [facility] will call you to set up a follow-up appointment         Future Appointments   Date Time Provider Department Center   11/11/2022  8:30 AM [id number]         For any urgent concerns, please contact our 24 hour nurse triage line: [phone] ([phone]         Ale Wright MA

## 2022-10-22 ENCOUNTER — HEALTH MAINTENANCE LETTER (OUTPATIENT)
Age: 57
End: 2022-10-22

## 2022-10-24 ENCOUNTER — TRANSFERRED RECORDS (OUTPATIENT)
Dept: HEALTH INFORMATION MANAGEMENT | Facility: CLINIC | Age: 57
End: 2022-10-24

## 2022-11-10 ENCOUNTER — NURSE TRIAGE (OUTPATIENT)
Dept: NURSING | Facility: CLINIC | Age: 57
End: 2022-11-10

## 2022-11-10 NOTE — TELEPHONE ENCOUNTER
Scheduled tomorrow at the specialty center for a liver ultrasound which she has every 6 months. Her concern is, when you fast, is it 8 hours prior? I told her that is correct.  Can she drink more than just water? She can have low blood sugars. I told her clear liquids up to one hour before procedure. So, Gatorade, apple juice, coffee, tea without cream or sugar would be clear liquids she can have. She has no other questions at this time.  Kourtney Fay RN  [facility]    Reason for Disposition    Health Information question, no triage required and triager able to answer question    Additional Information    Negative: [1] Caller is not with the adult (patient) AND [2] reporting urgent symptoms    Negative: Lab result questions    Negative: Medication questions    Negative: Caller can't be reached by phone    Negative: Caller has already spoken to PCP or another triager    Negative: RN needs further essential information from caller in order to complete triage    Negative: Requesting regular office appointment    Negative: [1] Caller requesting NON-URGENT health information AND [2] PCP's office is the best resource    Protocols used: INFORMATION ONLY CALL - NO TRIAGE-A-

## 2022-11-11 ENCOUNTER — HOSPITAL ENCOUNTER (OUTPATIENT)
Dept: ULTRASOUND IMAGING | Facility: CLINIC | Age: 57
Discharge: HOME | DRG: 442 | End: 2022-11-11
Attending: INTERNAL MEDICINE | Admitting: INTERNAL MEDICINE
Payer: COMMERCIAL

## 2022-11-11 DIAGNOSIS — K74.60 CIRRHOSIS (H): ICD-10-CM

## 2022-11-11 PROCEDURE — 76705 ECHO EXAM OF ABDOMEN: CPT

## 2022-11-13 ENCOUNTER — APPOINTMENT (OUTPATIENT)
Dept: CT IMAGING | Facility: CLINIC | Age: 57
DRG: 442 | End: 2022-11-13
Attending: EMERGENCY MEDICINE
Payer: COMMERCIAL

## 2022-11-13 ENCOUNTER — HOSPITAL ENCOUNTER (INPATIENT)
Facility: CLINIC | Age: 57
LOS: 5 days | Discharge: HOME | DRG: 442 | End: 2022-11-18
Attending: EMERGENCY MEDICINE | Admitting: INTERNAL MEDICINE
Payer: COMMERCIAL

## 2022-11-13 DIAGNOSIS — K76.82 HEPATIC ENCEPHALOPATHY (H): ICD-10-CM

## 2022-11-13 LAB
ALBUMIN SERPL BCG-MCNC: 3.7 G/DL (ref 3.5–5.2)
ALBUMIN UR-MCNC: NEGATIVE MG/DL
ALP SERPL-CCNC: 106 U/L (ref 35–104)
ALT SERPL W P-5'-P-CCNC: 48 U/L (ref 10–35)
AMMONIA PLAS-SCNC: 108 UMOL/L (ref 11–51)
ANION GAP SERPL CALCULATED.3IONS-SCNC: 15 MMOL/L (ref 7–15)
APPEARANCE UR: CLEAR
AST SERPL W P-5'-P-CCNC: 51 U/L (ref 10–35)
BACTERIA #/AREA URNS HPF: ABNORMAL /HPF
BASOPHILS # BLD AUTO: 0.1 10E3/UL (ref 0–0.2)
BASOPHILS NFR BLD AUTO: 1 %
BILIRUB SERPL-MCNC: 1.9 MG/DL
BILIRUB UR QL STRIP: NEGATIVE
BUN SERPL-MCNC: 14.1 MG/DL (ref 6–20)
CALCIUM SERPL-MCNC: 9.8 MG/DL (ref 8.6–10)
CHLORIDE SERPL-SCNC: 108 MMOL/L (ref 98–107)
COLOR UR AUTO: ABNORMAL
CREAT SERPL-MCNC: 0.72 MG/DL (ref 0.51–0.95)
DEPRECATED HCO3 PLAS-SCNC: 18 MMOL/L (ref 22–29)
EOSINOPHIL # BLD AUTO: 0.1 10E3/UL (ref 0–0.7)
EOSINOPHIL NFR BLD AUTO: 2 %
ERYTHROCYTE [DISTWIDTH] IN BLOOD BY AUTOMATED COUNT: 15.1 % (ref 10–15)
ETHANOL SERPL-MCNC: <0.01 G/DL
FLUAV RNA SPEC QL NAA+PROBE: NEGATIVE
FLUBV RNA RESP QL NAA+PROBE: NEGATIVE
GFR SERPL CREATININE-BSD FRML MDRD: >90 ML/MIN/1.73M2
GLUCOSE SERPL-MCNC: 95 MG/DL (ref 70–99)
GLUCOSE UR STRIP-MCNC: NEGATIVE MG/DL
HCT VFR BLD AUTO: 38.7 % (ref 35–47)
HGB BLD-MCNC: 12.2 G/DL (ref 11.7–15.7)
HGB UR QL STRIP: NEGATIVE
HOLD SPECIMEN: NORMAL
HOLD SPECIMEN: NORMAL
IMM GRANULOCYTES # BLD: 0 10E3/UL
IMM GRANULOCYTES NFR BLD: 0 %
INR PPP: 1.53 (ref 0.85–1.15)
KETONES UR STRIP-MCNC: NEGATIVE MG/DL
LACTATE SERPL-SCNC: 3.6 MMOL/L (ref 0.7–2)
LEUKOCYTE ESTERASE UR QL STRIP: NEGATIVE
LIPASE SERPL-CCNC: 42 U/L (ref 13–60)
LYMPHOCYTES # BLD AUTO: 1.2 10E3/UL (ref 0.8–5.3)
LYMPHOCYTES NFR BLD AUTO: 20 %
MAGNESIUM SERPL-MCNC: 2.1 MG/DL (ref 1.7–2.3)
MCH RBC QN AUTO: 30.9 PG (ref 26.5–33)
MCHC RBC AUTO-ENTMCNC: 31.5 G/DL (ref 31.5–36.5)
MCV RBC AUTO: 98 FL (ref 78–100)
MONOCYTES # BLD AUTO: 0.7 10E3/UL (ref 0–1.3)
MONOCYTES NFR BLD AUTO: 11 %
NEUTROPHILS # BLD AUTO: 4 10E3/UL (ref 1.6–8.3)
NEUTROPHILS NFR BLD AUTO: 66 %
NITRATE UR QL: NEGATIVE
NRBC # BLD AUTO: 0 10E3/UL
NRBC BLD AUTO-RTO: 0 /100
PH UR STRIP: 5 [PH] (ref 5–7)
PLATELET # BLD AUTO: 139 10E3/UL (ref 150–450)
POTASSIUM SERPL-SCNC: 3.6 MMOL/L (ref 3.4–5.3)
PROT SERPL-MCNC: 8.1 G/DL (ref 6.4–8.3)
RBC # BLD AUTO: 3.95 10E6/UL (ref 3.8–5.2)
RBC URINE: 1 /HPF
RSV RNA SPEC NAA+PROBE: NEGATIVE
SARS-COV-2 RNA RESP QL NAA+PROBE: NEGATIVE
SODIUM SERPL-SCNC: 141 MMOL/L (ref 136–145)
SP GR UR STRIP: 1.01 (ref 1–1.03)
SQUAMOUS EPITHELIAL: <1 /HPF
UROBILINOGEN UR STRIP-MCNC: NORMAL MG/DL
WBC # BLD AUTO: 6.1 10E3/UL (ref 4–11)
WBC URINE: <1 /HPF

## 2022-11-13 PROCEDURE — 85025 COMPLETE CBC W/AUTO DIFF WBC: CPT | Performed by: EMERGENCY MEDICINE

## 2022-11-13 PROCEDURE — 99285 EMERGENCY DEPT VISIT HI MDM: CPT | Mod: 25

## 2022-11-13 PROCEDURE — 258N000003 HC RX IP 258 OP 636: Performed by: EMERGENCY MEDICINE

## 2022-11-13 PROCEDURE — 83735 ASSAY OF MAGNESIUM: CPT | Performed by: EMERGENCY MEDICINE

## 2022-11-13 PROCEDURE — 93005 ELECTROCARDIOGRAM TRACING: CPT

## 2022-11-13 PROCEDURE — 80053 COMPREHEN METABOLIC PANEL: CPT | Performed by: EMERGENCY MEDICINE

## 2022-11-13 PROCEDURE — 82077 ASSAY SPEC XCP UR&BREATH IA: CPT | Performed by: EMERGENCY MEDICINE

## 2022-11-13 PROCEDURE — 83605 ASSAY OF LACTIC ACID: CPT | Performed by: EMERGENCY MEDICINE

## 2022-11-13 PROCEDURE — C9803 HOPD COVID-19 SPEC COLLECT: HCPCS

## 2022-11-13 PROCEDURE — 83690 ASSAY OF LIPASE: CPT | Performed by: EMERGENCY MEDICINE

## 2022-11-13 PROCEDURE — 85610 PROTHROMBIN TIME: CPT | Performed by: EMERGENCY MEDICINE

## 2022-11-13 PROCEDURE — 70450 CT HEAD/BRAIN W/O DYE: CPT

## 2022-11-13 PROCEDURE — 36415 COLL VENOUS BLD VENIPUNCTURE: CPT | Performed by: EMERGENCY MEDICINE

## 2022-11-13 PROCEDURE — 120N000001 HC R&B MED SURG/OB

## 2022-11-13 PROCEDURE — 99223 1ST HOSP IP/OBS HIGH 75: CPT | Mod: AI | Performed by: INTERNAL MEDICINE

## 2022-11-13 PROCEDURE — 87637 SARSCOV2&INF A&B&RSV AMP PRB: CPT | Performed by: EMERGENCY MEDICINE

## 2022-11-13 PROCEDURE — 81001 URINALYSIS AUTO W/SCOPE: CPT | Performed by: EMERGENCY MEDICINE

## 2022-11-13 PROCEDURE — 250N000013 HC RX MED GY IP 250 OP 250 PS 637: Performed by: INTERNAL MEDICINE

## 2022-11-13 PROCEDURE — 82140 ASSAY OF AMMONIA: CPT | Performed by: EMERGENCY MEDICINE

## 2022-11-13 RX ORDER — LACTULOSE 10 G/15ML
10 SOLUTION ORAL 3 TIMES DAILY
Status: DISCONTINUED | OUTPATIENT
Start: 2022-11-13 | End: 2022-11-17

## 2022-11-13 RX ORDER — ACETAMINOPHEN 650 MG/1
650 SUPPOSITORY RECTAL EVERY 6 HOURS PRN
Status: DISCONTINUED | OUTPATIENT
Start: 2022-11-13 | End: 2022-11-18 | Stop reason: HOSPADM

## 2022-11-13 RX ORDER — LIDOCAINE 40 MG/G
CREAM TOPICAL
Status: DISCONTINUED | OUTPATIENT
Start: 2022-11-13 | End: 2022-11-18 | Stop reason: HOSPADM

## 2022-11-13 RX ORDER — ACETAMINOPHEN 325 MG/1
650 TABLET ORAL EVERY 6 HOURS PRN
Status: DISCONTINUED | OUTPATIENT
Start: 2022-11-13 | End: 2022-11-18 | Stop reason: HOSPADM

## 2022-11-13 RX ORDER — ONDANSETRON 4 MG/1
4 TABLET, ORALLY DISINTEGRATING ORAL EVERY 6 HOURS PRN
Status: DISCONTINUED | OUTPATIENT
Start: 2022-11-13 | End: 2022-11-18 | Stop reason: HOSPADM

## 2022-11-13 RX ORDER — AMOXICILLIN 250 MG
1 CAPSULE ORAL 2 TIMES DAILY PRN
Status: DISCONTINUED | OUTPATIENT
Start: 2022-11-13 | End: 2022-11-18 | Stop reason: HOSPADM

## 2022-11-13 RX ORDER — PANTOPRAZOLE SODIUM 40 MG/1
40 TABLET, DELAYED RELEASE ORAL
Status: DISCONTINUED | OUTPATIENT
Start: 2022-11-14 | End: 2022-11-18 | Stop reason: HOSPADM

## 2022-11-13 RX ORDER — AMOXICILLIN 250 MG
2 CAPSULE ORAL 2 TIMES DAILY PRN
Status: DISCONTINUED | OUTPATIENT
Start: 2022-11-13 | End: 2022-11-18 | Stop reason: HOSPADM

## 2022-11-13 RX ORDER — ONDANSETRON 2 MG/ML
4 INJECTION INTRAMUSCULAR; INTRAVENOUS EVERY 6 HOURS PRN
Status: DISCONTINUED | OUTPATIENT
Start: 2022-11-13 | End: 2022-11-18 | Stop reason: HOSPADM

## 2022-11-13 RX ADMIN — LACTULOSE 10 G: 20 SOLUTION ORAL at 19:32

## 2022-11-13 RX ADMIN — SODIUM CHLORIDE 1000 ML: 9 INJECTION, SOLUTION INTRAVENOUS at 08:01

## 2022-11-13 RX ADMIN — LACTULOSE 10 G: 20 SOLUTION ORAL at 15:07

## 2022-11-13 RX ADMIN — RIFAXIMIN 550 MG: 550 TABLET ORAL at 19:32

## 2022-11-13 RX ADMIN — ACETAMINOPHEN 650 MG: 325 TABLET, FILM COATED ORAL at 22:42

## 2022-11-13 ASSESSMENT — ACTIVITIES OF DAILY LIVING (ADL)
ADLS_ACUITY_SCORE: 35
ADLS_ACUITY_SCORE: 20
ADLS_ACUITY_SCORE: 35
ADLS_ACUITY_SCORE: 20
ADLS_ACUITY_SCORE: 35

## 2022-11-13 ASSESSMENT — ENCOUNTER SYMPTOMS
FEVER: 0
BLOOD IN STOOL: 0
DIARRHEA: 0
CONFUSION: 1
DYSURIA: 0
SHORTNESS OF BREATH: 0
DIZZINESS: 0
VOMITING: 0
FREQUENCY: 0
SPEECH DIFFICULTY: 0
HEADACHES: 1
WEAKNESS: 0
ABDOMINAL PAIN: 0
NAUSEA: 0

## 2022-11-13 NOTE — ED PROVIDER NOTES
History   Chief Complaint:  Altered Mental Status       HPI   Mercedes Arita is a 57 year old female with history of Vang, ulcerative colitis, hepatic encephalopathy presenting with  with concerns for confusion.   states that he works overnights and when he left last night, patient was otherwise well.  He returned at 1 AM patient was noted to be confused not knowing the day, year, president.  Given previous presentations, gave her an extra dose of lactulose at 2 AM and 3 AM and gave her something to eat.  She had some improvement with her orientation questions and hour later.  Unfortunately, she had another episode of mild confusion later prompting their ER visit.  They report that patient recently switched from lactulose to rifaximin 3 weeks prior.  Patient states that she has otherwise been taking this regularly.  She reports slight headache today.  She denies any fevers, neck stiffness, chest pain, shortness of breath, abdominal pain, nausea/vomiting, black/bloody stools, urinary symptoms or other changes to her medications.  She denies any speech slurring, facial drooping, unilateral weakness, dizziness/lightheadedness.    Review of Systems   Constitutional: Negative for fever.   Respiratory: Negative for shortness of breath.    Cardiovascular: Negative for chest pain.   Gastrointestinal: Negative for abdominal pain, blood in stool, diarrhea, nausea and vomiting.   Genitourinary: Negative for dysuria, frequency and urgency.   Neurological: Positive for headaches. Negative for dizziness, speech difficulty and weakness.   Psychiatric/Behavioral: Positive for confusion.   All other systems reviewed and are negative.      Allergies:  No Known Allergies    Medications:  Cholecalciferol (VITAMIN D3) 50 MCG (2000 UT) CAPS  cyanocobalamin 1000 MCG/ML injection  lactulose (CEPHULAC) 10 GM packet  Multiple Vitamins-Minerals (MULTIVITAMIN GUMMIES ADULTS PO)  omeprazole (PRILOSEC) 20 MG DR capsule        Past  Medical History:     Past Medical History:   Diagnosis Date     Chronic pain      Crohn's      Crohn's disease (H)      PONV (postoperative nausea and vomiting)      Patient Active Problem List    Diagnosis Date Noted     Hepatic encephalopathy 10/17/2022     Priority: Medium     Crohn disease (H) 01/17/2018     Priority: Medium     Neuropathy 10/08/2013     Priority: Medium     Post surgical neuropathy central abdomen       CARDIOVASCULAR SCREENING; LDL GOAL LESS THAN 130 07/30/2013     Priority: Medium     Crohn's disease of ileum (H) 09/19/2012     Priority: Medium        Past Surgical History:    Past Surgical History:   Procedure Laterality Date     CHOLECYSTECTOMY       DILATE RECTUM  9/19/2012    Procedure: DILATE RECTUM;  Exam Under Anesthesia, Dilation with Stricture, lysis of adhesions, Laparoscopic hand assisted Ileocolic Resection ;  Surgeon: Magy Macias MD;  Location: [facility] OR     GASTROSCOPY N/A 1/18/2018    Procedure: GASTROSCOPY;  GASTROSCOPY WITH balloon DILATION of duodenal stricture up to 13.5mm UNDER FLUOROSCOPY (MAC SEDATION) ;  Surgeon: Aaliyah Langley MD;  Location:  OR     GI SURGERY  1987,1996,2012    bowel resection times 3 ileo-colonic resections     HERNIA REPAIR      Ventral times 2 with mesh.     LAPAROSCOPIC RESECTION ILEOCECAL  9/19/2012    Procedure: LAPAROSCOPIC RESECTION ILEOCECAL;;  Surgeon: Magy Macias MD;  Location:  OR     LAPAROTOMY EXPLORATORY      For removal of adhesions.     MOHS MICROGRAPHIC PROCEDURE      Removal of skin cancer on face.     ORTHOPEDIC SURGERY      carpal tunnel        Family History:    No family history on file.    Social History:  The patient presents to the ED with .    Physical Exam     Patient Vitals for the past 24 hrs:   BP Temp Temp src Pulse Resp SpO2   11/13/22 0700 (!) 132/98 -- -- 111 -- 98 %   11/13/22 0650 (!) 141/89 -- -- 112 -- --   11/13/22 0615 133/85 -- -- 101 -- 98 %   11/13/22 0542 -- 98  F (36.7   C) -- -- -- --   11/13/22 0541 133/85 -- -- 112 -- --   11/13/22 0540 -- -- Temporal -- 20 99 %       Physical Exam   General: Alert, no acute distress; oriented x 4.  Neuro:  PERRL.  EOMI.  Gait stable, no focal deficits; CN II-XII grossly intact. 5/5 BUE/BLE strength, SILT to BUE/BLE.  HEENT:  Moist mucous membranes.  Conjunctiva normal.   CV:  Tachycardic, regular rhythm, no m/r/g, skin warm and well perfused  Pulm:  CTAB, no wheezes/ronchi/rales.  No acute distress, breathing comfortably  GI:  Soft, nontender, nondistended.  No rebound or guarding.   MSK:  Moving all extremities.  No focal areas of edema, erythema, or tenderness; mild asterixis  Skin:  WWP, no rashes, no lower extremity edema, skin color normal, no diaphoresis    Emergency Department Course     ECG:  Indication: Altered mental status  Completed at 0557.  Read at 0600.   Sinus tachycardia, nonspecific ST abnormality. No significant changes from prior ECG (10/20/17).   Rate 106 bpm. ME interval 128. QRS duration 66. QT/QTc 358/475. P-R-T axes 29 48 11.    Imaging:  Head CT w/o contrast   Final Result   IMPRESSION:   1.  No acute intracranial process or significant interval change.        Report per radiology.     Laboratory:  Labs Ordered and Resulted from Time of ED Arrival to Time of ED Departure   COMPREHENSIVE METABOLIC PANEL - Abnormal       Result Value    Sodium 141      Potassium 3.6      Chloride 108 (*)     Carbon Dioxide (CO2) 18 (*)     Anion Gap 15      Urea Nitrogen 14.1      Creatinine 0.72      Calcium 9.8      Glucose 95      Alkaline Phosphatase 106 (*)     AST 51 (*)     ALT 48 (*)     Protein Total 8.1      Albumin 3.7      Bilirubin Total 1.9 (*)     GFR Estimate >90     LACTIC ACID WHOLE BLOOD - Abnormal    Lactic Acid 3.6 (*)    AMMONIA - Abnormal    Ammonia 108 (*)    CBC WITH PLATELETS AND DIFFERENTIAL - Abnormal    WBC Count 6.1      RBC Count 3.95      Hemoglobin 12.2      Hematocrit 38.7      MCV 98      MCH 30.9       MCHC 31.5      RDW 15.1 (*)     Platelet Count 139 (*)     % Neutrophils 66      % Lymphocytes 20      % Monocytes 11      % Eosinophils 2      % Basophils 1      % Immature Granulocytes 0      NRBCs per 100 WBC 0      Absolute Neutrophils 4.0      Absolute Lymphocytes 1.2      Absolute Monocytes 0.7      Absolute Eosinophils 0.1      Absolute Basophils 0.1      Absolute Immature Granulocytes 0.0      Absolute NRBCs 0.0     INR - Abnormal    INR 1.53 (*)    MAGNESIUM - Normal    Magnesium 2.1     ETHYL ALCOHOL LEVEL - Normal    Alcohol ethyl <0.01     LIPASE - Normal    Lipase 42     INFLUENZA A/B & SARS-COV2 PCR MULTIPLEX - Normal    Influenza A PCR Negative      Influenza B PCR Negative      RSV PCR Negative      SARS CoV2 PCR Negative     GLUCOSE MONITOR NURSING POCT   ROUTINE UA WITH MICROSCOPIC REFLEX TO CULTURE   COVID-19 VIRUS (CORONAVIRUS) BY PCR       Procedures  None    Emergency Department Course:      Reviewed:  I reviewed nursing notes, vitals, and past medical history    Assessments:   I performed a physical exam of the patient. Findings as above.      Patient rechecked and updated. Plan of care discussed and questions answered.     Consults:     I spoke with Dr. Wray of the medicine service from [facility] regarding patient's presentation, findings, and plan of care.    Interventions:  Medications   0.9% sodium chloride BOLUS (1,000 mLs Intravenous New Bag 11/13/22 0801)       Disposition:  The patient was admitted to the hospital under the care of Dr. Wray.     Impression & Plan     Covid-19  Mercedes Arita was evaluated during a global COVID-19 pandemic, which necessitated consideration that the patient might be at risk for infection with the SARS-CoV-2 virus that causes COVID-19.   Applicable protocols for evaluation were followed during the patient's care.   COVID-19 was considered as part of the patient's evaluation. The plan for testing is: a test was obtained during this visit.     Medical  Decision Making:  Mercedes Arita is a 57 year old female with history of ulcerative colitis, PRINGLE, hepatic encephalopathy presenting to the ER for evaluation of confusion.  Please eval for details of HPI and exam.  Patient is afebrile, slightly tachycardic but otherwise hemodynamically stable.  She has no physical concerns or complaints other than mild headache.  She was otherwise in her normal state of health yesterday when last seen by .  She did receive lactulose x2 at home.  Her neurologic exam is nonfocal, doubt stroke.  Did consider broad differential including hepatic encephalopathy, acute intracranial process, severe electrolyte/metabolic derangement, infectious etiology including UTI amongst other things.  EKG shows no acute ischemic appearing changes.  Lab studies are remarkable for stable liver enzymes, slight elevation in INR, normal CBC, elevated ammonia levels.  CT head obtained shows no acute process.  Patient is alert and mildly confused by evaluation.  We will admit the patient to ensure continued clearing of her mentation.  Patient and  are agreeable with this.  Discussed case with hospitalist team who accepted the patient for ongoing monitoring and care.    Diagnosis:    ICD-10-CM    1. Hepatic encephalopathy  K76.82           Discharge Medications:  New Prescriptions    No medications on file       [facility] MD  11/13/22 0748

## 2022-11-13 NOTE — ED NOTES
[facility]  ED Nurse Handoff Report    Mercedes Arita is a 57 year old female   ED Chief complaint: Altered Mental Status  . ED Diagnosis:   Final diagnoses:   Hepatic encephalopathy     Allergies: No Known Allergies    Code Status: Full Code  Activity level - Baseline/Home:  Independent. Activity Level - Current:   Stand by Assist. Lift room needed: No. Bariatric: No   Needed: No   Isolation: No. Infection: Not Applicable.     Vital Signs:   Vitals:    11/13/22 0830 11/13/22 0900 11/13/22 0915 11/13/22 1000   BP: (!) 141/83 125/68 128/76 124/73   Pulse: 106 102 111 100   Resp:       Temp:       TempSrc:       SpO2: 98% 100% 99% 97%       Cardiac Rhythm:  ,      Pain level:    Patient confused: No. Patient Falls Risk: Yes.   Elimination Status: Has voided   Patient Report - Initial Complaint: Mercedes Arita is a 57 year old female with history of Vang, ulcerative colitis, hepatic encephalopathy presenting with  with concerns for confusion.   states that he works overnights and when he left last night, patient was otherwise well.  He returned at 1 AM patient was noted to be confused not knowing the day, year, president.  Given previous presentations, gave her an extra dose of lactulose at 2 AM and 3 AM and gave her something to eat.  She had some improvement with her orientation questions and hour later.  Unfortunately, she had another episode of mild confusion later prompting their ER visit.  They report that patient recently switched from lactulose to rifaximin 3 weeks prior.  Patient states that she has otherwise been taking this regularly.  She reports slight headache today.  She denies any fevers, neck stiffness, chest pain, shortness of breath, abdominal pain, nausea/vomiting, black/bloody stools, urinary symptoms or other changes to her medications.  She denies any speech slurring, facial drooping, unilateral weakness, dizziness/lightheadedness.. Focused Assessment:  Ambulates to bathroom with stand by assist. Slow to respond.   Tests Performed: Labs, Imaging, EKG. Abnormal Results:   Abnormal Labs Resulted from Time of ED Arrival to Time of ED Departure   COMPREHENSIVE METABOLIC PANEL - Abnormal       Result Value    Sodium 141      Potassium 3.6      Chloride 108 (*)     Carbon Dioxide (CO2) 18 (*)     Anion Gap 15      Urea Nitrogen 14.1      Creatinine 0.72      Calcium 9.8      Glucose 95      Alkaline Phosphatase 106 (*)     AST 51 (*)     ALT 48 (*)     Protein Total 8.1      Albumin 3.7      Bilirubin Total 1.9 (*)     GFR Estimate >90     LACTIC ACID WHOLE BLOOD - Abnormal    Lactic Acid 3.6 (*)    AMMONIA - Abnormal    Ammonia 108 (*)    ROUTINE UA WITH MICROSCOPIC REFLEX TO CULTURE - Abnormal    Color Urine Light Yellow      Appearance Urine Clear      Glucose Urine Negative      Bilirubin Urine Negative      Ketones Urine Negative      Specific Gravity Urine 1.007      Blood Urine Negative      pH Urine 5.0      Protein Albumin Urine Negative      Urobilinogen Urine Normal      Nitrite Urine Negative      Leukocyte Esterase Urine Negative      Bacteria Urine Few (*)     RBC Urine 1      WBC Urine <1      Squamous Epithelials Urine <1     CBC WITH PLATELETS AND DIFFERENTIAL - Abnormal    WBC Count 6.1      RBC Count 3.95      Hemoglobin 12.2      Hematocrit 38.7      MCV 98      MCH 30.9      MCHC 31.5      RDW 15.1 (*)     Platelet Count 139 (*)     % Neutrophils 66      % Lymphocytes 20      % Monocytes 11      % Eosinophils 2      % Basophils 1      % Immature Granulocytes 0      NRBCs per 100 WBC 0      Absolute Neutrophils 4.0      Absolute Lymphocytes 1.2      Absolute Monocytes 0.7      Absolute Eosinophils 0.1      Absolute Basophils 0.1      Absolute Immature Granulocytes 0.0      Absolute NRBCs 0.0     INR - Abnormal    INR 1.53 (*)      Head CT w/o contrast   Final Result   IMPRESSION:   1.  No acute intracranial process or significant interval change.          Treatments provided: See MAR   Family Comments:  at bedside.   OBS brochure/video discussed/provided to patient:  N/A  ED Medications:   Medications   0.9% sodium chloride BOLUS (1,000 mLs Intravenous New Bag 11/13/22 0801)     Drips infusing:  No  For the majority of the shift, the patient's behavior Green. Interventions performed were N/A.    Sepsis treatment initiated: No     Patient tested for COVID 19 prior to admission: YES. Negative    ED Nurse Name/Phone Number: Debbie Craft RN,   10:40 AM  RECEIVING UNIT ED HANDOFF REVIEW    Above ED Nurse Handoff Report was reviewed: Yes  Reviewed by: Sumi Carlisle RN on November 13, 2022 at 8:57 PM

## 2022-11-13 NOTE — PHARMACY-ADMISSION MEDICATION HISTORY
Admission medication history interview status for this patient is complete. See [facility] admission navigator for allergy information, prior to admission medications and immunization status.     Medication history interview done, indicate source(s): Patient  Medication history resources (including written lists, pill bottles, clinic record):SureScripts and Care Everywhere  [id number]    Changes made to PTA medication list:  Added: rifaximin  Changed: None  Reported as Not Taking: None  Removed: None    Actions taken by pharmacist (provider contacted, etc): Spoke with patient to verify med list.     Additional medication history information:None    Medication reconciliation/reorder completed by provider prior to medication history?  N   (Y/N)     For patients on insulin therapy: N    Prior to Admission medications    Medication Sig Last Dose Taking? Auth Provider Long Term End Date   Cholecalciferol (VITAMIN D3) 50 MCG (2000 UT) CAPS Take 2,000 Units by mouth daily 11/13/2022 at am Yes Reported, Patient     cyanocobalamin 1000 MCG/ML injection Inject 1 mL as directed every 30 days. Past Month Yes Reported, Patient     lactulose (CEPHULAC) 10 GM packet Take 2 packets (20 g) by mouth 2 times daily Titrate doses with goal of 3-4 bowel movements per day Past Month Yes Darshan Mendez MD     Multiple Vitamins-Minerals (MULTIVITAMIN GUMMIES ADULTS PO) Take 1 chew tab by mouth daily 11/12/2022 Yes Reported, Patient     omeprazole (PRILOSEC) 20 MG DR capsule Take 20 mg by mouth daily 11/12/2022 at am Yes Reported, Patient     rifaximin (XIFAXAN) 550 MG TABS tablet Take 550 mg by mouth 2 times daily Past Month Yes Unknown, Entered By History

## 2022-11-13 NOTE — ED TRIAGE NOTES
Pt arrives to ED with SO c/o AMS. Pt states she is lethargic and foggy. Pt has hx crohn's. Pt was seen one month ago and had high ammonia, on lactulose with results. Pt slow to respond in triage and needs multiple cues. SO answering questions for pt without pt having time to respond. Pt denies ETOH/drug use.

## 2022-11-13 NOTE — H&P
[facility]    History and Physical - Hospitalist Service       Date of Admission:  11/13/2022    Assessment & Plan      Mercedes Arita is a 57 year old female with history of liver cirrhosis, not EtOH related, PRINGLE, ulcerative colitis, recent hospitalization for hepatic encephalopathy last month, reportedly on maintenance rifaximin and was recently changed from her prior lactulose, who was in her usual state of health until last night when patient's  came home and he found however patient to be disoriented and pleasantly confused.    Problem list:  #1 alteration in mental state presented with confusion and disorientation  #2 hyperammonemia, likely related to hepatic encephalopathy  #3 history of Pringle, liver cirrhosis    -Admit as inpatient.  Continuing close monitoring and care  -At risk for fall events.  Fall precautions please  -No obvious signs and symptoms of infectious process.  No abdominal pain.  Afebrile.  -Pending urinalysis.  No leukocytosis  -Start her back on lactulose 10 g 3 times daily with goals of 2-3 bowel movements per day  -Resume her recent added rifaximin  -Suspecting that she might need dual therapy if she is truly compliant with her rifaximin as this is her readmission from recent hospitalization 3 weeks prior with similar presentation  -I will request formal evaluation from [place] [facility].  She is an established patient of [facility]  -We will request for abdominal ultrasound to see if there is any underlying ascites, if none then likely has no underlying SBP that might precipitated this hepatic encephalopathy  -We will asked nutrition service evaluation given recurrent hepatic encephalopathy    #4 thrombocytopenia-stable, likely related to longstanding history of cirrhosis    #5 history of Crohn's disease, prior bowel resections and duodenal stricture             Diet:  Regular  DVT Prophylaxis: Pneumatic Compression Devices  Rueda Catheter: Not  "present  Central Lines: None  Cardiac Monitoring: None  Code Status:  Full    Clinically Significant Risk Factors Present on Admission                      # Overweight: Estimated body mass index is 28.11 kg/m  as calculated from the following:    Height as of 10/18/22: 1.575 m (5' 2\").    Weight as of 10/18/22: 69.7 kg (153 lb 11.2 oz).           Disposition Plan      Expected Discharge Date: 11/15/2022                The patient's care was discussed with the Patient and ED team and patient's spouse    Kirit Wray MD, MD  Hospitalist Service  [facility]  Securely message with the Vocera Web Console (learn more here)  Text page via [employer] | [employer] Paging/Directory         ______________________________________________________________________    Chief Complaint   Alteration in mental state    History is obtained from the patient  Discussion with emergency room physician  Earlier ER spoke with patient's  as well  Discussed with patient's spouse at bedside    History of Present Illness   Mercedes Arita is a 57 year old female with history of liver cirrhosis, not EtOH related, PRINGLE, ulcerative colitis, recent hospitalization for hepatic encephalopathy last month, reportedly on maintenance rifaximin and was recently changed from her prior lactulose, who was in her usual state of health until last night when patient's  came home and he found however patient to be disoriented and pleasantly confused.  There were no reports of any fevers, chills, nausea, vomiting or obvious bleeding tendencies or symptomatology.  There were no prior reports of having diarrhea, urinary symptomatology.  Reportedly she is compliant with the rifaximin but possibly missed some dose yesterday.  No reports of syncope or fall events nor any of focal weakness or slurring of speech or facial asymmetry.  Upon presentation to the emergency room she demonstrated stable blood pressure levels, tachycardic, not hypoxic " and afebrile.  Initially had some mild asterixis.  She received dosing of lactulose at home and started on IV fluids here in the hospital.  Ammonia level was elevated at 103, no leukocytosis, reassuring kidney function and electrolytes.  Has a pending urine analysis.  Her case was referred to us for further management and care hence this hospitalization.      Review of Systems    Cannot be obtained accurately as patient is currently a poor historian.  Other system review as presented in the HPI and corroborative history from patient's family    Past Medical History    I have reviewed this patient's medical history and updated it with pertinent information if needed.   Past Medical History:   Diagnosis Date     Chronic pain     Abdominal pain     Crohn's     Chrons     Crohn's disease (H)     diagnosed 1983     PONV (postoperative nausea and vomiting)        Past Surgical History   I have reviewed this patient's surgical history and updated it with pertinent information if needed.  Past Surgical History:   Procedure Laterality Date     CHOLECYSTECTOMY       DILATE RECTUM  9/19/2012    Procedure: DILATE RECTUM;  Exam Under Anesthesia, Dilation with Stricture, lysis of adhesions, Laparoscopic hand assisted Ileocolic Resection ;  Surgeon: Magy Macias MD;  Location:  OR     GASTROSCOPY N/A 1/18/2018    Procedure: GASTROSCOPY;  GASTROSCOPY WITH balloon DILATION of duodenal stricture up to 13.5mm UNDER FLUOROSCOPY (MAC SEDATION) ;  Surgeon: Aaliyah Langley MD;  Location:  OR     GI SURGERY  1987,1996,2012    bowel resection times 3 ileo-colonic resections     HERNIA REPAIR      Ventral times 2 with mesh.     LAPAROSCOPIC RESECTION ILEOCECAL  9/19/2012    Procedure: LAPAROSCOPIC RESECTION ILEOCECAL;;  Surgeon: Magy Macias MD;  Location:  OR     LAPAROTOMY EXPLORATORY      For removal of adhesions.     MOHS MICROGRAPHIC PROCEDURE      Removal of skin cancer on face.     ORTHOPEDIC SURGERY       carpal tunnel       Social History   I have reviewed this patient's social history and updated it with pertinent information if needed.  Social History     Tobacco Use     Smoking status: Never     Smokeless tobacco: Never   Substance Use Topics     Alcohol use: Yes     Comment: 2 times yearly.     Drug use: No       Family History     No significant family history    Prior to Admission Medications   Prior to Admission Medications   Prescriptions Last Dose Informant Patient Reported? Taking?   Cholecalciferol (VITAMIN D3) 50 MCG (2000 UT) CAPS   Yes No   Sig: Take by mouth daily   Multiple Vitamins-Minerals (MULTIVITAMIN GUMMIES ADULTS PO)   Yes No   Sig: Take 1 chew tab by mouth daily   cyanocobalamin 1000 MCG/ML injection  Self Yes No   Sig: Inject 1 mL as directed every 30 days.   lactulose (CEPHULAC) 10 GM packet   No No   Sig: Take 2 packets (20 g) by mouth 2 times daily Titrate doses with goal of 3-4 bowel movements per day   omeprazole (PRILOSEC) 20 MG DR capsule   Yes No   Sig: Take 20 mg by mouth daily      Facility-Administered Medications: None     Allergies   No Known Allergies    Physical Exam   Vital Signs: Temp: 98  F (36.7  C) Temp src: Temporal BP: (!) 132/98 Pulse: 111   Resp: 20 SpO2: 98 % O2 Device: None (Room air)    Weight: 0 lbs 0 oz    HEENT; Atraumatic, normocephalic, pinkish conjuctiva, pupils bilateral reactive   Skin: warm and moist, no rashes  Lymphatics: no cervical or axillary lymphandenopathy  Lungs: equal chest expansion, clear to auscultation, no wheezes, no stridor, no crackles,   Heart: normal rate, normal rhythm, no rubs or gallops.   Abdomen: normal bowel sounds, no tenderness, no peritoneal signs, no guarding  Extremities: no deformities, no edema   Neuro; follow commands, alert and oriented x2, spontaneous speech, coherent, moves all extremities spontaneously  Psych; no hallucination, euthymic mood, not agitated        Data   Data reviewed today: I reviewed all medications,  new labs and imaging results over the last 24 hours. I personally reviewed the EKG tracing showing No new EKG seen in epic.    Recent Labs   Lab 11/13/22 0613   WBC 6.1   HGB 12.2   MCV 98   *      POTASSIUM 3.6   CHLORIDE 108*   CO2 18*   BUN 14.1   CR 0.72   ANIONGAP 15   TRAVIS 9.8   GLC 95   ALBUMIN 3.7   PROTTOTAL 8.1   BILITOTAL 1.9*   ALKPHOS 106*   ALT 48*   AST 51*   LIPASE 42     Most Recent 3 CBC's:Recent Labs   Lab Test 11/13/22  0613 10/19/22  0740 10/18/22  0625   WBC 6.1 5.9 6.0   HGB 12.2 12.9 10.7*   MCV 98 98 98   * 174 119*     Most Recent 2 LFT's:Recent Labs   Lab Test 11/13/22  0613 10/19/22  0740   AST 51* 73*   ALT 48* 44*   ALKPHOS 106* 101   BILITOTAL 1.9* 2.8*     Most Recent 3 Troponin's:No lab results found.  Most Recent 3 BNP's:No lab results found.  Most Recent 6 Bacteria Isolates From Any Culture (See EPIC Reports for Culture Details):No lab results found.  Most Recent 6 glucoses:Recent Labs   Lab Test 11/13/22  0613 10/19/22  0740 10/18/22  0625 10/17/22  1700 10/17/22  1623 09/28/22  1452   GLC 95 120* 93 128* 115* 90     Most Recent Urinalysis:Recent Labs   Lab Test 10/17/22  1743   COLOR Light Yellow   APPEARANCE Clear   URINEGLC Negative   URINEBILI Negative   URINEKETONE Negative   SG 1.010   UBLD Negative   URINEPH 5.5   PROTEIN Negative   NITRITE Negative   LEUKEST Trace*   RBCU <1   WBCU 2     Most Recent Anemia Panel:Recent Labs   Lab Test 11/13/22  0613 10/17/22  1700 09/28/22  1452 02/23/22  1413 12/27/21  1353   WBC 6.1   < > 5.9   < > 5.5   HGB 12.2   < > 12.2   < > 13.4   HCT 38.7   < > 38.9   < > 40.2   MCV 98   < > 101*   < > 97   *   < > 132*   < > 137*   IRON  --   --   --   --  96   IRONSAT  --   --   --   --  25   RETICABSCT  --   --  0.101*   < > 0.142*   RETP  --   --  2.6*   < > 3.4*   FEB  --   --   --   --  385   KIANA  --   --   --   --  62   B12  --   --   --   --  589   FOLIC  --   --   --   --  25.4    < > = values in this interval  not displayed.     Most Recent CPK:No lab results found.  Recent Results (from the past 24 hour(s))   Head CT w/o contrast    Narrative    EXAM: CT HEAD W/O CONTRAST  LOCATION: [facility]  DATE/TIME: 11/13/2022 7:29 AM    INDICATION: Altered mental status  COMPARISON: CT head 10/17/2022  TECHNIQUE: Routine CT Head without IV contrast. Multiplanar reformats. Dose reduction techniques were used.    FINDINGS:  INTRACRANIAL CONTENTS: No intracranial hemorrhage, extraaxial collection, or mass effect.  No CT evidence of acute infarct. Normal parenchymal attenuation. Normal ventricles and sulci.     VISUALIZED ORBITS/SINUSES/MASTOIDS: No intraorbital abnormality. No paranasal sinus mucosal disease. No middle ear or mastoid effusion.    BONES/SOFT TISSUES: No acute abnormality.      Impression    IMPRESSION:  1.  No acute intracranial process or significant interval change.

## 2022-11-13 NOTE — CONSULTS
Consult Date: 11/13/2022    CHIEF COMPLAINT:  We were asked to see Ms. Arita for further evaluation of hepatic encephalopathy.    HISTORY OF PRESENT ILLNESS:  The patient is a 57-year-old woman who has been having problems with confusion most of this year.  She underwent a workup by my partner in January with a liver biopsy, which demonstrated cirrhosis with steatohepatitis.  NAFLD score was 4/6.  Since then, she has had recurrent problems with confusion and was hospitalized for this within the last month.  Her  came home tonight, found her to be confused and she was brought to the hospital for further evaluation.  Currently, she is more alert and is actually able to give me some history and is oriented x3.  She has been on lactulose in the past, but apparently this was stopped and she was kept on rifaximin alone.  She is unable to tell me why the lactulose was stopped.  Her past history is notable for cirrhosis, felt to be secondary to nonalcoholic steatohepatitis recurrent hepatic encephalopathy and she does have a history of Crohn's disease with bowel resections x2, which were ileocecal resections, but in more recent history Crohn's has been quiescent and she is currently not on any therapy.  She did have a recent MR enterography with some small bowel narrowing noted.  She also has a history of a duodenal stricture, which was dilated several times by my partner several years ago.  Also, history of rectal stenosis, hernia repair, carpal tunnel and cholecystectomy.  She also has a history of postop nausea and vomiting.  She is currently a nonsmoker, nondrinker.    MEDICATIONS:  Include vitamin D, multivitamins, B12, lactulose, omeprazole and Xifaxan.    REVIEW OF SYSTEMS:  According to the patient, is negative except for her previous cholecystectomy.      LABORATORY:  Current electrolytes are unremarkable.  BUN and creatinine 14 and 0.72.  Ammonia level 108, alkaline phosphatase 106, ALT 48, AST 51,  bilirubin 1.9.  Lactate 3.6.  CBC: White count 6.1, hemoglobin 12.2, platelets 139,000, normal differential.  She did have a recent CT scan of her head, which is negative.  Ultrasound done 2 days ago was negative for any ascites.  Current MELD score is 14.    IMPRESSION:  Recurrent hepatic encephalopathy.  There is no evidence for ascites, therefore spontaneous peritonitis would be unlikely. She is on no medications that would precipitate it. Electrolytes are unremarkable, and there does not appear to be any evidence of bleeding.  It may be worth evaluating whether she has a splenorenal shunt, which could be embolized to improve her encephalopathy.  According to the patient, her last endoscopy was 2 years ago and may be time to update that.  Would favor keeping her on lactulose as preferred medication for encephalopathy.  Note that with her current height and weight, that would give her a BMI of 27.4, suggesting that she most likely has a genetic polymorphism making her unusually predisposed to PRINGLE.    Kehinde Perez MD        D: 2022   T: 2022   MT: [id number]    Name:     BEVERLY KNOWLESOlive  MRN:      [id number]        Account:      [id number]   :      1965           Consult Date: 2022     Document: [id number]

## 2022-11-14 ENCOUNTER — APPOINTMENT (OUTPATIENT)
Dept: CT IMAGING | Facility: CLINIC | Age: 57
DRG: 442 | End: 2022-11-14
Attending: PHYSICIAN ASSISTANT
Payer: COMMERCIAL

## 2022-11-14 LAB
ALBUMIN SERPL BCG-MCNC: 3.1 G/DL (ref 3.5–5.2)
ALBUMIN UR-MCNC: NEGATIVE MG/DL
ALP SERPL-CCNC: 84 U/L (ref 35–104)
ALT SERPL W P-5'-P-CCNC: 40 U/L (ref 10–35)
AMMONIA PLAS-SCNC: 65 UMOL/L (ref 11–51)
AMPHETAMINES UR QL SCN: NORMAL
ANION GAP SERPL CALCULATED.3IONS-SCNC: 10 MMOL/L (ref 7–15)
APPEARANCE UR: CLEAR
AST SERPL W P-5'-P-CCNC: 43 U/L (ref 10–35)
ATRIAL RATE - MUSE: 106 BPM
BARBITURATES UR QL SCN: NORMAL
BASOPHILS # BLD AUTO: 0.1 10E3/UL (ref 0–0.2)
BASOPHILS NFR BLD AUTO: 1 %
BENZODIAZ UR QL SCN: NORMAL
BILIRUB SERPL-MCNC: 2 MG/DL
BILIRUB UR QL STRIP: NEGATIVE
BUN SERPL-MCNC: 8.5 MG/DL (ref 6–20)
BZE UR QL SCN: NORMAL
CALCIUM SERPL-MCNC: 8.8 MG/DL (ref 8.6–10)
CANNABINOIDS UR QL SCN: NORMAL
CHLORIDE SERPL-SCNC: 109 MMOL/L (ref 98–107)
COLOR UR AUTO: ABNORMAL
CREAT SERPL-MCNC: 0.67 MG/DL (ref 0.51–0.95)
DEPRECATED HCO3 PLAS-SCNC: 22 MMOL/L (ref 22–29)
DIASTOLIC BLOOD PRESSURE - MUSE: NORMAL MMHG
EOSINOPHIL # BLD AUTO: 0.3 10E3/UL (ref 0–0.7)
EOSINOPHIL NFR BLD AUTO: 5 %
ERYTHROCYTE [DISTWIDTH] IN BLOOD BY AUTOMATED COUNT: 15.3 % (ref 10–15)
GFR SERPL CREATININE-BSD FRML MDRD: >90 ML/MIN/1.73M2
GLUCOSE SERPL-MCNC: 83 MG/DL (ref 70–99)
GLUCOSE UR STRIP-MCNC: NEGATIVE MG/DL
HCT VFR BLD AUTO: 34.3 % (ref 35–47)
HGB BLD-MCNC: 10.7 G/DL (ref 11.7–15.7)
HGB UR QL STRIP: NEGATIVE
IMM GRANULOCYTES # BLD: 0 10E3/UL
IMM GRANULOCYTES NFR BLD: 0 %
INTERPRETATION ECG - MUSE: NORMAL
KETONES UR STRIP-MCNC: NEGATIVE MG/DL
LEUKOCYTE ESTERASE UR QL STRIP: NEGATIVE
LYMPHOCYTES # BLD AUTO: 2.1 10E3/UL (ref 0.8–5.3)
LYMPHOCYTES NFR BLD AUTO: 41 %
MAGNESIUM SERPL-MCNC: 2 MG/DL (ref 1.7–2.3)
MCH RBC QN AUTO: 30.9 PG (ref 26.5–33)
MCHC RBC AUTO-ENTMCNC: 31.2 G/DL (ref 31.5–36.5)
MCV RBC AUTO: 99 FL (ref 78–100)
MONOCYTES # BLD AUTO: 0.5 10E3/UL (ref 0–1.3)
MONOCYTES NFR BLD AUTO: 10 %
MUCOUS THREADS #/AREA URNS LPF: PRESENT /LPF
NEUTROPHILS # BLD AUTO: 2.2 10E3/UL (ref 1.6–8.3)
NEUTROPHILS NFR BLD AUTO: 43 %
NITRATE UR QL: NEGATIVE
NRBC # BLD AUTO: 0 10E3/UL
NRBC BLD AUTO-RTO: 0 /100
OPIATES UR QL SCN: NORMAL
P AXIS - MUSE: 29 DEGREES
PCP QUAL URINE (ROCHE): NORMAL
PH UR STRIP: 5 [PH] (ref 5–7)
PLATELET # BLD AUTO: 112 10E3/UL (ref 150–450)
POTASSIUM SERPL-SCNC: 3.8 MMOL/L (ref 3.4–5.3)
PR INTERVAL - MUSE: 128 MS
PROT SERPL-MCNC: 6.8 G/DL (ref 6.4–8.3)
QRS DURATION - MUSE: 66 MS
QT - MUSE: 358 MS
QTC - MUSE: 475 MS
R AXIS - MUSE: 48 DEGREES
RBC # BLD AUTO: 3.46 10E6/UL (ref 3.8–5.2)
RBC URINE: 0 /HPF
SODIUM SERPL-SCNC: 141 MMOL/L (ref 136–145)
SP GR UR STRIP: 1.01 (ref 1–1.03)
SQUAMOUS EPITHELIAL: <1 /HPF
SYSTOLIC BLOOD PRESSURE - MUSE: NORMAL MMHG
T AXIS - MUSE: 11 DEGREES
UROBILINOGEN UR STRIP-MCNC: NORMAL MG/DL
VENTRICULAR RATE- MUSE: 106 BPM
WBC # BLD AUTO: 5.2 10E3/UL (ref 4–11)
WBC URINE: 2 /HPF

## 2022-11-14 PROCEDURE — 85025 COMPLETE CBC W/AUTO DIFF WBC: CPT | Performed by: INTERNAL MEDICINE

## 2022-11-14 PROCEDURE — 80307 DRUG TEST PRSMV CHEM ANLYZR: CPT | Performed by: INTERNAL MEDICINE

## 2022-11-14 PROCEDURE — 82140 ASSAY OF AMMONIA: CPT | Performed by: INTERNAL MEDICINE

## 2022-11-14 PROCEDURE — 250N000011 HC RX IP 250 OP 636: Performed by: INTERNAL MEDICINE

## 2022-11-14 PROCEDURE — 99232 SBSQ HOSP IP/OBS MODERATE 35: CPT | Performed by: INTERNAL MEDICINE

## 2022-11-14 PROCEDURE — 250N000009 HC RX 250: Performed by: PHYSICIAN ASSISTANT

## 2022-11-14 PROCEDURE — 36415 COLL VENOUS BLD VENIPUNCTURE: CPT | Performed by: INTERNAL MEDICINE

## 2022-11-14 PROCEDURE — 81001 URINALYSIS AUTO W/SCOPE: CPT | Performed by: INTERNAL MEDICINE

## 2022-11-14 PROCEDURE — 83735 ASSAY OF MAGNESIUM: CPT | Performed by: INTERNAL MEDICINE

## 2022-11-14 PROCEDURE — 74177 CT ABD & PELVIS W/CONTRAST: CPT

## 2022-11-14 PROCEDURE — 80053 COMPREHEN METABOLIC PANEL: CPT | Performed by: INTERNAL MEDICINE

## 2022-11-14 PROCEDURE — 120N000001 HC R&B MED SURG/OB

## 2022-11-14 PROCEDURE — 250N000013 HC RX MED GY IP 250 OP 250 PS 637: Performed by: INTERNAL MEDICINE

## 2022-11-14 PROCEDURE — 250N000011 HC RX IP 250 OP 636: Performed by: PHYSICIAN ASSISTANT

## 2022-11-14 RX ORDER — IOPAMIDOL 755 MG/ML
500 INJECTION, SOLUTION INTRAVASCULAR ONCE
Status: COMPLETED | OUTPATIENT
Start: 2022-11-14 | End: 2022-11-14

## 2022-11-14 RX ORDER — VITAMIN B COMPLEX
2000 TABLET ORAL DAILY
Status: DISCONTINUED | OUTPATIENT
Start: 2022-11-14 | End: 2022-11-18 | Stop reason: HOSPADM

## 2022-11-14 RX ORDER — FLUTICASONE PROPIONATE 50 MCG
1 SPRAY, SUSPENSION (ML) NASAL DAILY
Status: DISCONTINUED | OUTPATIENT
Start: 2022-11-14 | End: 2022-11-18 | Stop reason: HOSPADM

## 2022-11-14 RX ADMIN — FLUTICASONE PROPIONATE 1 SPRAY: 50 SPRAY, METERED NASAL at 20:53

## 2022-11-14 RX ADMIN — IOPAMIDOL 70 ML: 755 INJECTION, SOLUTION INTRAVENOUS at 13:15

## 2022-11-14 RX ADMIN — PANTOPRAZOLE SODIUM 40 MG: 40 TABLET, DELAYED RELEASE ORAL at 08:09

## 2022-11-14 RX ADMIN — SODIUM CHLORIDE 59 ML: 9 INJECTION, SOLUTION INTRAVENOUS at 13:15

## 2022-11-14 RX ADMIN — LACTULOSE 10 G: 20 SOLUTION ORAL at 14:21

## 2022-11-14 RX ADMIN — RIFAXIMIN 550 MG: 550 TABLET ORAL at 20:53

## 2022-11-14 RX ADMIN — LACTULOSE 10 G: 20 SOLUTION ORAL at 08:09

## 2022-11-14 RX ADMIN — ONDANSETRON 4 MG: 2 INJECTION INTRAMUSCULAR; INTRAVENOUS at 12:58

## 2022-11-14 RX ADMIN — Medication 2000 UNITS: at 14:21

## 2022-11-14 RX ADMIN — LACTULOSE 10 G: 20 SOLUTION ORAL at 20:53

## 2022-11-14 RX ADMIN — RIFAXIMIN 550 MG: 550 TABLET ORAL at 08:09

## 2022-11-14 ASSESSMENT — ACTIVITIES OF DAILY LIVING (ADL)
ADLS_ACUITY_SCORE: 20

## 2022-11-14 NOTE — PLAN OF CARE
Goal Outcome Evaluation:      Plan of Care Reviewed With: patient    PT AOX4. Denies pain. VssRA. Up with standby assist. Voiding good amounts. No BM this shift.

## 2022-11-14 NOTE — PROGRESS NOTES
"[facility]  [facility]  Gastroenterology Progress note    Interval History:    Doing well, no abdominal pain. Having 2-3 bowel movements per day, no evidence of GI bleeding.     Vital Signs:      /61 (BP Location: Left arm)   Pulse 83   Temp 97.8  F (36.6  C) (Temporal)   Resp 16   Ht 1.575 m (5' 2\")   Wt 69.4 kg (153 lb 0.8 oz)   SpO2 95%   BMI 27.99 kg/m    Temp (24hrs), Av.1  F (36.7  C), Min:97.8  F (36.6  C), Max:98.4  F (36.9  C)    Patient Vitals for the past 72 hrs:   Weight   22 2131 69.4 kg (153 lb 0.8 oz)     No intake or output data in the 24 hours ending 22 1108      Constitutional: NAD, comfortable, alert and oriented x 3.   Cardiovascular: RRR, normal S1, S2   Respiratory: CTAB  Abdomen: soft, non-tender, nondistended, +BS    Additional Comments:  ROS, FH, SH: See initial GI consult for details.    Laboratory Data:  Recent Labs   Lab Test 2215 22  0613 10/19/22  0740 22  1452 22  0806 22  1413   WBC 5.2  --  6.1 5.9   < > 4.3  4.3 5.1   HGB 10.7*  --  12.2 12.9   < > 13.0  13.2 12.6   MCV 99  --  98 98   < > 100  100 100   *  --  139* 174   < > 132*  128* 119*   INR  --  1.53*  --   --   --  1.36* 1.35*    < > = values in this interval not displayed.     Recent Labs   Lab Test 2213 10/19/22  0740    141 139   POTASSIUM 3.8 3.6 3.8   CHLORIDE 109* 108* 103   CO2 22 18* 24   BUN 8.5 14.1 7.7   CR 0.67 0.72 0.85   ANIONGAP 10 15 12   TRAVIS 8.8 9.8 9.3     Recent Labs   Lab Test 22  0603 22  0547 22  0950 22  0613 10/19/22  0740 10/18/22  0625 10/17/22  1743 22  1452 22  0806 20  0000 18  0719 18  1826 10/20/17  1819   ALBUMIN  --  3.1*  --  3.7 3.6   < >  --    < > 3.2*   < > 3.1* 3.7 3.6   BILITOTAL  --  2.0*  --  1.9* 2.8*   < >  --    < > 1.9*   < > 0.6 0.5 0.6   DBIL  --   --   --   --  0.58* "  --   --   --  0.6*  --  <0.1  --   --    ALT  --  40*  --  48* 44*   < >  --    < > 42   < > 70* 82* 101*   AST  --  43*  --  51* 73*   < >  --    < > 44   < > 55* 50* 51*   ALKPHOS  --  84  --  106* 101   < >  --    < > 95   < > 67 86 82   PROTEIN Negative  --  Negative  --   --   --  Negative  --   --   --   --   --   --    LIPASE  --   --   --  42  --   --   --   --   --   --   --  140 225    < > = values in this interval not displayed.       Hepatic Encephalopathy  Assessment: Recurrent hepatic encephalopathy. Ultrasound done 2 days ago was negative for any ascites.  Current MELD score is 14. There is no evidence for ascites, therefore spontaneous peritonitis would be unlikely. She is on no medications that would precipitate it. Electrolytes are unremarkable, and there does not appear to be any evidence of bleeding.  It may be worth evaluating whether she has a splenorenal shunt, which could be embolized to improve her encephalopathy.  According to the patient, her last endoscopy was 2 years ago and may be time to update that.  Would favor keeping her on lactulose as preferred medication for encephalopathy.  Note that with her current height and weight, that would give her a BMI of 27.4, suggesting that she most likely has a genetic polymorphism making her unusually predisposed to PRINGLE.    Plan:  -- CT scan abdomen and pelvis to assess for splenorenal shunt.  -- Continue lactulose and rifaximin. Can titrate lactulose for 3 bowel movements daily but seems to be achieving this currently.     Will discuss with Dr. Puente.    Cecy Tucker PA-C  [facility]  Cell: [phone] until 12PM  Office: [phone]

## 2022-11-14 NOTE — CONSULTS
"CLINICAL NUTRITION SERVICES  -  ASSESSMENT NOTE      Recommendations:   - Continue diet as ordered.  Deferring to MD teams if no added salt combination justified.      MALNUTRITION:  % Weight Loss:  None noted  % Intake:  No decreased intake noted  Subcutaneous Fat Loss:  None observed   Muscle Loss:  Temporal region mild depletion, Clavicle bone region mild depletion and Acromion bone region mild depletion --> likely age-related and would not use as indicator at this time  Fluid Retention:  None noted or documented    Malnutrition Diagnosis: Patient does not meet two of the above criteria necessary for diagnosing malnutrition          REASON FOR ASSESSMENT  Mercedes Arita is a 57 year old female seen by Registered Dietitian for Provider Order - Nutrition Education - \"recurrent hepatic encephalopathy\".    PMH of: Cirrhosis, PRINGLE, Crohn's with previous ileocecal resections and previous duodenal stricture requiring dilation, recent admit for hepatic encephalopathy.    Admit 2/2: AMS, hyperammonemia (noted in GI note that Lactulose recently \"stopped\").      NUTRITION HISTORY  - Information obtained from patient and chart.  - Diet at home: Regular, verbalizes consistent protein.  Denies that this was a recommendation from a specific provider/team or something she herself read about, but does note her GI team has recommended a \"Meditteranean diet\" and that she was not sure what this meant.   - Usual intakes: Meals TID.  - Barriers to PO intakes: Denies skipping of meals or having smaller portions PTA.  She feels in the past weeks to months PTA she has actually gained weight and is eating consistently.  - Use of oral supplements: None (wrinkled nose when mentioned).  Food-based beliefs.  - Allergies: NKFA.      CURRENT NUTRITION ORDERS  Diet Order:     Regular    Current Intake/Tolerance:  Limited timeframe since admit.  Reports good appetite.       Obtained from Chart/Interdisciplinary Team:  - No documentation of PI  - " "Stooling patterns reviewed    ANTHROPOMETRICS  Height: 5' 2\"  Weight: 153 lbs .8 oz  Body mass index is 27.99 kg/m .  Weight Status:  Overweight BMI 25-29.9  Weight History:  Wt Readings from Last 10 Encounters:   11/13/22 69.4 kg (153 lb 0.8 oz)   10/18/22 69.7 kg (153 lb 11.2 oz)   10/04/22 70.7 kg (155 lb 14.4 oz)   04/04/22 71.1 kg (156 lb 11.2 oz)   03/02/22 71.7 kg (158 lb)   12/29/21 72.7 kg (160 lb 4.8 oz)   11/12/21 71.7 kg (158 lb)   01/18/18 73.1 kg (161 lb 3.2 oz)   12/31/16 76.1 kg (167 lb 12.3 oz)   10/08/13 69.9 kg (154 lb)     - No ascites based on imaging, no edema based on flowsheet review.  Appears wt has been relatively stable over the past month and since 4/2022 based on above.      LABS  Labs reviewed.      MEDICATIONS  Medications reviewed.      ASSESSED NUTRITION NEEDS PER APPROVED PRACTICE GUIDELINES:    Dosing Weight 69 kg   Estimated Energy Needs: 25-30 Kcal/Kg  Justification: maintenance  Estimated Protein Needs: 1-1.2 g pro/Kg  Justification: preservation of lean body mass  Estimated Fluid Needs: per MD      NUTRITION DIAGNOSIS:  Predicted inadequate nutrient intake (energy/protein) related to potential for decline in PO intakes during admit.     NUTRITION INTERVENTIONS  Recommendations / Nutrition Prescription  See above.      Implementation  Nutrition education: Provided education on RD following peripherally, answered questions regarding Mediterranean diet and encouraged consistent protein intake at meals.       Nutrition Goals  Patient to consume at least 75% of meals TID while acutely admitted.      MONITORING AND EVALUATION:  Progress towards goals will be monitored and evaluated per protocol and Practice Guidelines          France Pulido RDN, LD  Clinical Dietitian  3rd floor/ICU: [phone]  All other floors: [phone]  Weekend/holiday: [phone]  Office: [phone]  "

## 2022-11-14 NOTE — PLAN OF CARE
"A&Ox4, neuros intact.  VSS.  Denies  pain, did report some \"sinus congestion\", Flonase ordered, awaiting from pharmacy.  Up in room independently, gait observed steady.  C/o nausea at lunch time, relieved with IV Zofran x1.  No emesis.  Voiding without difficulty.  Reports BM x4 since 0800.  Ammonia level down to 65 this a.m.   Abdominal CT completed. Potassium/magnesium WNL, recheck in a.m.  Plan for discharge home when medically stable.                        "

## 2022-11-14 NOTE — PROGRESS NOTES
[facility]    Medicine Progress Note - Hospitalist Service    Date of Admission:  11/13/2022    Assessment & Plan      Mercedes Arita is a 57 year old female with history of liver cirrhosis, not EtOH related, PRINGLE, ulcerative colitis, recent hospitalization for hepatic encephalopathy last month, reportedly on maintenance rifaximin and was recently changed from her prior lactulose, who was in her usual state of health until last night when patient's  came home and he found however patient to be disoriented and pleasantly confused.    Problem list:  #1 alteration in mental state presented with confusion and disorientation  #2 hyperammonemia, likely related to hepatic encephalopathy  #3 history of Pringle, liver cirrhosis    -Continue current inpatient care.    Continuing close monitoring and care  -May discontinue neurochecks.  -No clear source of any infectious process.  Urine analysis negative for infection  -Reportedly had a recent abdominal ultrasound last Friday which showed no ascites unlikely to have SBP  -Mental state improving.  Hyperammonemia improving  -Demonstrating 2-3 bowel movements.  Currently on Xifaxan and lactulose  -And I appreciate continuous input from [facility].  Plans for CT scan of the abdomen pelvis to assess for any splenorenal shunt that might predispose her from having recurrent encephalopathy  -At risk for fall events.  Fall precautions please  -We will asked nutrition service evaluation given recurrent hepatic encephalopathy    #4 thrombocytopenia-stable, likely related to longstanding history of cirrhosis    #5 history of Crohn's disease, prior bowel resections and duodenal stricture         Diet: Combination Diet Regular Diet Adult    DVT Prophylaxis: Pneumatic Compression Devices  Rueda Catheter: Not present  Central Lines: None  Cardiac Monitoring: None  Code Status: Full Code      Disposition Plan     Expected Discharge Date: 11/15/2022      "           The patient's care was discussed with the Bedside Nurse and Patient.    Kirit Wray MD, MD  Hospitalist Service  [facility]  Securely message with the Vocera Web Console (learn more here)  Text page via [employer] Paging/Directory         Clinically Significant Risk Factors           # Hypercalcemia: corrected calcium is >10.1, will monitor as appropriate    # Hypoalbuminemia: Lowest albumin = 3.1 g/dL (Ref range: 3.5-5.2) at 11/14/2022  5:47 AM, will monitor as appropriate   # Thrombocytopenia: Lowest platelets = 112 (Ref range: 150-450) in last 2 days, will monitor for bleeding        # Overweight: Estimated body mass index is 27.99 kg/m  as calculated from the following:    Height as of this encounter: 1.575 m (5' 2\").    Weight as of this encounter: 69.4 kg (153 lb 0.8 oz)., PRESENT ON ADMISSION         ______________________________________________________________________    Interval History   Continuing service care today.  Seen and examined.  Chart reviewed.  Case discussed with nursing service.  Ms. Carter is actually doing well as she is awake, alert, oriented as compared to yesterday's mental status exam.  She mentioned that she was able to sleep well overnight.  Currently tolerating oral diet.  No nausea, vomiting or abdominal pain.  Denies any bleeding tendencies.  Voiding freely.  Passing flatus and had 2-3 bowel movements earlier.  Nonbloody, nonmelanotic stools reported.  Currently afebrile.  Not reporting any chest pain, shortness of breath.  Not requiring any oxygen support.        Data reviewed today: I reviewed all medications, new labs and imaging results over the last 24 hours. I personally reviewed .    Physical Exam   Vital Signs: Temp: 97.8  F (36.6  C) Temp src: Temporal BP: 122/61 Pulse: 83   Resp: 16 SpO2: 95 % O2 Device: None (Room air)    Weight: 153 lbs .8 oz  HEENT; Atraumatic, normocephalic, pinkish conjuctiva, pupils bilateral reactive   Skin: " warm and moist, no rashes  Lymphatics: no cervical or axillary lymphandenopathy  Lungs: equal chest expansion, clear to auscultation, no wheezes, no stridor, no crackles,   Heart: normal rate, normal rhythm, no rubs or gallops.   Abdomen: normal bowel sounds, no tenderness, no peritoneal signs, no guarding  Extremities: no deformities, no edema   Neuro; follow commands, alert and oriented x3, spontaneous speech, coherent, moves all extremities spontaneously  Psych; no hallucination, euthymic mood, not agitated      Data   Recent Labs   Lab 11/14/22  0547 11/13/22  0615 11/13/22  0613   WBC 5.2  --  6.1   HGB 10.7*  --  12.2   MCV 99  --  98   *  --  139*   INR  --  1.53*  --      --  141   POTASSIUM 3.8  --  3.6   CHLORIDE 109*  --  108*   CO2 22  --  18*   BUN 8.5  --  14.1   CR 0.67  --  0.72   ANIONGAP 10  --  15   TRAVIS 8.8  --  9.8   GLC 83  --  95   ALBUMIN 3.1*  --  3.7   PROTTOTAL 6.8  --  8.1   BILITOTAL 2.0*  --  1.9*   ALKPHOS 84  --  106*   ALT 40*  --  48*   AST 43*  --  51*   LIPASE  --   --  42     No results found for this or any previous visit (from the past 24 hour(s)).  Medications       lactulose  10 g Oral TID     pantoprazole  40 mg Oral QAM AC     rifaximin  550 mg Oral BID     sodium chloride (PF)  3 mL Intracatheter Q8H      12-Nov-2021 23:00

## 2022-11-15 LAB
AMMONIA PLAS-SCNC: 74 UMOL/L (ref 11–51)
HOLD SPECIMEN: NORMAL
MAGNESIUM SERPL-MCNC: 2 MG/DL (ref 1.7–2.3)
POTASSIUM SERPL-SCNC: 3.4 MMOL/L (ref 3.4–5.3)

## 2022-11-15 PROCEDURE — 82140 ASSAY OF AMMONIA: CPT | Performed by: INTERNAL MEDICINE

## 2022-11-15 PROCEDURE — 83735 ASSAY OF MAGNESIUM: CPT | Performed by: INTERNAL MEDICINE

## 2022-11-15 PROCEDURE — 120N000001 HC R&B MED SURG/OB

## 2022-11-15 PROCEDURE — 84132 ASSAY OF SERUM POTASSIUM: CPT | Performed by: INTERNAL MEDICINE

## 2022-11-15 PROCEDURE — 36415 COLL VENOUS BLD VENIPUNCTURE: CPT | Performed by: INTERNAL MEDICINE

## 2022-11-15 PROCEDURE — 99232 SBSQ HOSP IP/OBS MODERATE 35: CPT | Performed by: INTERNAL MEDICINE

## 2022-11-15 PROCEDURE — 250N000013 HC RX MED GY IP 250 OP 250 PS 637: Performed by: INTERNAL MEDICINE

## 2022-11-15 RX ORDER — ZINC SULFATE 50(220)MG
220 CAPSULE ORAL DAILY
Status: DISCONTINUED | OUTPATIENT
Start: 2022-11-15 | End: 2022-11-18 | Stop reason: HOSPADM

## 2022-11-15 RX ADMIN — PANTOPRAZOLE SODIUM 40 MG: 40 TABLET, DELAYED RELEASE ORAL at 06:25

## 2022-11-15 RX ADMIN — Medication 2000 UNITS: at 08:43

## 2022-11-15 RX ADMIN — LACTULOSE 10 G: 20 SOLUTION ORAL at 19:41

## 2022-11-15 RX ADMIN — LACTULOSE 10 G: 20 SOLUTION ORAL at 08:43

## 2022-11-15 RX ADMIN — ACETAMINOPHEN 650 MG: 325 TABLET, FILM COATED ORAL at 16:06

## 2022-11-15 RX ADMIN — FLUTICASONE PROPIONATE 1 SPRAY: 50 SPRAY, METERED NASAL at 08:43

## 2022-11-15 RX ADMIN — RIFAXIMIN 550 MG: 550 TABLET ORAL at 19:41

## 2022-11-15 RX ADMIN — ZINC SULFATE 220 MG (50 MG) CAPSULE 220 MG: CAPSULE at 16:07

## 2022-11-15 RX ADMIN — LACTULOSE 10 G: 20 SOLUTION ORAL at 14:28

## 2022-11-15 RX ADMIN — RIFAXIMIN 550 MG: 550 TABLET ORAL at 08:43

## 2022-11-15 ASSESSMENT — ACTIVITIES OF DAILY LIVING (ADL)
ADLS_ACUITY_SCORE: 20

## 2022-11-15 NOTE — PLAN OF CARE
"Care from [id number]    Inpatient Progress Note:  For complete assessment see flow sheet documentation.    /56 (BP Location: Left arm)   Pulse 83   Temp 97.8  F (36.6  C) (Temporal)   Resp 16   Ht 1.575 m (5' 2\")   Wt 69.4 kg (153 lb 0.8 oz)   SpO2 94%   BMI 27.99 kg/m         Orientation: A&O x4  Neuro: WNL  Pain status: Pain free  Activity: SBA  Peripheral edema: N/A   Resp: WNL  Cardiac: WNL  GI: Lactulose been given due to elevated ammonia level. Patient has met the goal for 3 BM per day.  :  WNL  Skin: WNL  LDA: PIV  Infusions: N/A   Diet: regular  Consults: GI  Discharge Plan: Possible discharge home 11/15    A&Ox4, VS WNL on RA. Lung sounds clear all lobes. 3 BM noted today. No other complaints or concerns were reported. Instructed patient to call with needs. Slept well. No PRN medication given over night.  Will continue to monitor and provide cares.     Tory Alvarez RN    "

## 2022-11-15 NOTE — PROGRESS NOTES
"[facility]  [facility]  Gastroenterology Progress note    Interval History:    Having a headache on her right side of forehead, also due for some dental work on upper right side.  Has dental appointment 22.    Vital Signs:      /57 (BP Location: Left arm)   Pulse 86   Temp 98  F (36.7  C) (Temporal)   Resp 16   Ht 1.575 m (5' 2\")   Wt 69.4 kg (153 lb 0.8 oz)   SpO2 97%   BMI 27.99 kg/m    Temp (24hrs), Av.1  F (36.7  C), Min:97.8  F (36.6  C), Max:98.4  F (36.9  C)    Patient Vitals for the past 72 hrs:   Weight   22 2131 69.4 kg (153 lb 0.8 oz)     No intake or output data in the 24 hours ending 22 1108      Constitutional: NAD, comfortable, alert and oriented x 3.   Cardiovascular: RRR, normal S1, S2   Respiratory: CTAB  Abdomen: soft, non-tender, nondistended, +BS    Additional Comments:  ROS, FH, SH: See initial GI consult for details.    Laboratory Data:  Recent Labs   Lab Test 22  0547 22  0615 22  0613 10/19/22  0740 22  1452 22  0806 22  1413   WBC 5.2  --  6.1 5.9   < > 4.3  4.3 5.1   HGB 10.7*  --  12.2 12.9   < > 13.0  13.2 12.6   MCV 99  --  98 98   < > 100  100 100   *  --  139* 174   < > 132*  128* 119*   INR  --  1.53*  --   --   --  1.36* 1.35*    < > = values in this interval not displayed.     Recent Labs   Lab Test 11/15/22  0611 22  0547 22  0613 10/19/22  0740   NA  --  141 141 139   POTASSIUM 3.4 3.8 3.6 3.8   CHLORIDE  --  109* 108* 103   CO2  --  22 18* 24   BUN  --  8.5 14.1 7.7   CR  --  0.67 0.72 0.85   ANIONGAP  --  10 15 12   TRAVIS  --  8.8 9.8 9.3     Recent Labs   Lab Test 22  0603 22  0547 22  0950 22  0613 10/19/22  0740 10/18/22  0625 10/17/22  1743 22  1452 22  0806 20  0000 18  0719 18  1826 10/20/17  1819   ALBUMIN  --  3.1*  --  3.7 3.6   < >  --    < > 3.2*   < > 3.1* 3.7 3.6   BILITOTAL  --  " 2.0*  --  1.9* 2.8*   < >  --    < > 1.9*   < > 0.6 0.5 0.6   DBIL  --   --   --   --  0.58*  --   --   --  0.6*  --  <0.1  --   --    ALT  --  40*  --  48* 44*   < >  --    < > 42   < > 70* 82* 101*   AST  --  43*  --  51* 73*   < >  --    < > 44   < > 55* 50* 51*   ALKPHOS  --  84  --  106* 101   < >  --    < > 95   < > 67 86 82   PROTEIN Negative  --  Negative  --   --   --  Negative  --   --   --   --   --   --    LIPASE  --   --   --  42  --   --   --   --   --   --   --  140 225    < > = values in this interval not displayed.       CT 11/14/22:  1.  Small splenorenal shunt is suggested.  2.  Increased splenomegaly compared to 1/17/2018.  3.  Nonspecific mild wall thickening at the distal rectum. This could  just relate to nondistention. Correlate with colonoscopy to assess for  any underlying colonic mass.  4.  Indeterminant hypodense nodule at the left kidney. Recommend  correlation with nonurgent renal MRI.    Hepatic Encephalopathy  Assessment: Admitted with hepatic encephalopathy. History of ileocolonic and perianal Crohn's disease, history of surgery.  Last colonoscopy 2020. Also has history of PRINGLE cirrhosis that seems to have been progressing lately with 2 recent admissions for hepatic encephalpathy.  Last EGD June 2022 with small varices.  Had been taking lactulose and rifaximin at home, stopped lactulose but then admitted with asterixis.   Possible small splenorenal shunt seen on CT.     Plan:  -- I will discuss splenorenal shunt with IR and get back to the patient  -- we will arrange outpatient colonoscopy, she was not due until 2023 but given CT findings will do sooner although likely artifact.   -- Continue lactulose and rifaximin.   -- start daily zinc supplements - ordered    Angely Chaudhary MD  [facility]  [phone]

## 2022-11-15 NOTE — PROGRESS NOTES
[facility]    Medicine Progress Note - Hospitalist Service    Date of Admission:  11/13/2022    Assessment & Plan      Mercedes Arita is a 57 year old female with history of liver cirrhosis, not EtOH related, PRINGLE, ulcerative colitis, recent hospitalization for hepatic encephalopathy last month, reportedly on maintenance rifaximin and was recently changed from her prior lactulose, who was in her usual state of health until last night when patient's  came home and he found however patient to be disoriented and pleasantly confused.    Problem list:  #1 alteration in mental state presented with confusion and disorientation  #2 hyperammonemia, likely related to hepatic encephalopathy  #3 history of Pringle, liver cirrhosis    -Continue current inpatient care.    Continuing close monitoring and care  -May discontinue neurochecks.  -No clear source of any infectious process.  Urine analysis negative for infection  -Reportedly had a recent abdominal ultrasound last Friday which showed no ascites unlikely to have SBP  -Mental state improving.  Hyperammonemia was earlier improving but now on an upward trend  -Demonstrating 2-3 bowel movements.  Currently on Xifaxan and lactulose  -CT scan done earlier of the abdomen pelvis care of GI service did show possible small splenorenal shunting, increasing splenomegaly  -Will await further instructions from GI service regarding this findings  -We will asked nutrition service evaluation given recurrent hepatic encephalopathy    #4 thrombocytopenia-stable, likely related to longstanding history of cirrhosis    #5 history of Crohn's disease, prior bowel resections and duodenal stricture         Diet: Combination Diet Regular Diet Adult    DVT Prophylaxis: Pneumatic Compression Devices  Rueda Catheter: Not present  Central Lines: None  Cardiac Monitoring: None  Code Status: Full Code      Disposition Plan      Expected Discharge Date: 11/16/2022        Discharge  "Comments: .        The patient's care was discussed with the Bedside Nurse and Patient.    Kirit Wray MD, MD  Hospitalist Service  [facility]  Securely message with the Vocera Web Console (learn more here)  Text page via [employer] Paging/Directory         Clinically Significant Risk Factors              # Hypoalbuminemia: Lowest albumin = 3.1 g/dL (Ref range: 3.5-5.2) at 11/14/2022  5:47 AM, will monitor as appropriate   # Thrombocytopenia: Lowest platelets = 112 (Ref range: 150-450) in last 2 days, will monitor for bleeding        # Overweight: Estimated body mass index is 27.99 kg/m  as calculated from the following:    Height as of this encounter: 1.575 m (5' 2\").    Weight as of this encounter: 69.4 kg (153 lb 0.8 oz)., PRESENT ON ADMISSION         ______________________________________________________________________    Interval History   Continuing service care today.  Seen and examined.  Chart reviewed.  Case discussed with nursing service.  As always Ms. Carter is very pleasant, interactive and currently endorses no complaints of abdominal pain, vomiting, nausea, bleeding tendencies nor urinary symptoms.  She is able to tolerate oral diet.  Not agitated or combative.  Ambulating with no assistance.          Data reviewed today: I reviewed all medications, new labs and imaging results over the last 24 hours. I personally reviewed .    Physical Exam   Vital Signs: Temp: 98  F (36.7  C) Temp src: Temporal BP: 108/57 Pulse: 86   Resp: 16 SpO2: 97 % O2 Device: None (Room air)    Weight: 153 lbs .8 oz  HEENT; Atraumatic, normocephalic, pinkish conjuctiva, pupils bilateral reactive   Skin: warm and moist, no rashes  Lymphatics: no cervical or axillary lymphandenopathy  Lungs: equal chest expansion, clear to auscultation, no wheezes, no stridor, no crackles,   Heart: normal rate, normal rhythm, no rubs or gallops.   Abdomen: normal bowel sounds, no tenderness, no peritoneal signs, no " guarding  Extremities: no deformities, no edema   Neuro; follow commands, alert and oriented x3, spontaneous speech, coherent, moves all extremities spontaneously  Psych; no hallucination, euthymic mood, not agitated      Data   Recent Labs   Lab 11/15/22  0611 11/14/22  0547 11/13/22  0615 11/13/22  0613   WBC  --  5.2  --  6.1   HGB  --  10.7*  --  12.2   MCV  --  99  --  98   PLT  --  112*  --  139*   INR  --   --  1.53*  --    NA  --  141  --  141   POTASSIUM 3.4 3.8  --  3.6   CHLORIDE  --  109*  --  108*   CO2  --  22  --  18*   BUN  --  8.5  --  14.1   CR  --  0.67  --  0.72   ANIONGAP  --  10  --  15   TRAVIS  --  8.8  --  9.8   GLC  --  83  --  95   ALBUMIN  --  3.1*  --  3.7   PROTTOTAL  --  6.8  --  8.1   BILITOTAL  --  2.0*  --  1.9*   ALKPHOS  --  84  --  106*   ALT  --  40*  --  48*   AST  --  43*  --  51*   LIPASE  --   --   --  42     Recent Results (from the past 24 hour(s))   CT Abdomen Pelvis w Contrast    Narrative    CT ABDOMEN AND PELVIS WITH CONTRAST 11/14/2022 1:19 PM    CLINICAL HISTORY: Recurrent hepatic encephalopathy, assess for  splenorenal shunt.    TECHNIQUE: CT scan of the abdomen and pelvis was performed following  injection of IV contrast. Multiplanar reformats were obtained. Dose  reduction techniques were used.  CONTRAST: 70mL Isovue-370    COMPARISON: CT abdomen and pelvis 1/17/2018.    FINDINGS:   LOWER CHEST: Normal.    HEPATOBILIARY: Mildly heterogeneous-appearing hepatic parenchyma. A  few incidental small hepatic cysts. Cholecystectomy.    PANCREAS: Normal.    SPLEEN: Enlarged spleen is 13 cm, previously 9.1 cm. No focal splenic  lesion is seen. There is a small splenorenal shunt noted anastomosing  with the left renal vein series 2 image 82 and on adjacent images.  This appears to eventually communicate with the splenic vein be a  tortuous small vessels surrounding the spleen and entering the splenic  vein at the splenic hilum.    ADRENAL GLANDS: Normal.    KIDNEYS/BLADDER: No  hydronephrosis. No urolithiasis. No acute renal  abnormality. Hypodense nodule is indeterminant at the posterior left  mid kidney measuring 1 cm series 2 image 83. Bladder unremarkable.    BOWEL: Postoperative change of the bowel. No obstruction or acute  inflammatory change. Some mild wall prominence of the transverse colon  likely is as a result of nondistention. There is decompression and  wall thickening at the distal rectum at the mid pelvis level series 2  image 172.    PELVIC ORGANS: No acute abnormality.    ADDITIONAL FINDINGS: None.    MUSCULOSKELETAL: Normal.      Impression    IMPRESSION:   1.  Small splenorenal shunt is suggested.  2.  Increased splenomegaly compared to 1/17/2018.  3.  Nonspecific mild wall thickening at the distal rectum. This could  just relate to nondistention. Correlate with colonoscopy to assess for  any underlying colonic mass.  4.  Indeterminant hypodense nodule at the left kidney. Recommend  correlation with nonurgent renal MRI.    HAYDE TOTH MD         SYSTEM ID:  [id number]     Medications       fluticasone  1 spray Both Nostrils Daily     lactulose  10 g Oral TID     pantoprazole  40 mg Oral QAM AC     rifaximin  550 mg Oral BID     sodium chloride (PF)  3 mL Intracatheter Q8H     Vitamin D3  2,000 Units Oral Daily

## 2022-11-15 NOTE — PLAN OF CARE
Goal Outcome Evaluation:      Plan of Care Reviewed With: patient    Overall Patient Progress: no changeOverall Patient Progress: no change         A&Ox4, SL, RA, Ind    No c/o pain or discomfort, states frustration with additional day in hospital    Ammonia levels increasing from 65 to 74 - no cognitive changes, GI following    Possible discharge 11/16

## 2022-11-16 ENCOUNTER — TRANSFERRED RECORDS (OUTPATIENT)
Dept: HEALTH INFORMATION MANAGEMENT | Facility: CLINIC | Age: 57
End: 2022-11-16

## 2022-11-16 LAB
AMMONIA PLAS-SCNC: 109 UMOL/L (ref 11–51)
MAGNESIUM SERPL-MCNC: 1.8 MG/DL (ref 1.7–2.3)
POTASSIUM SERPL-SCNC: 3.9 MMOL/L (ref 3.4–5.3)

## 2022-11-16 PROCEDURE — 120N000001 HC R&B MED SURG/OB

## 2022-11-16 PROCEDURE — 36415 COLL VENOUS BLD VENIPUNCTURE: CPT | Performed by: INTERNAL MEDICINE

## 2022-11-16 PROCEDURE — 250N000013 HC RX MED GY IP 250 OP 250 PS 637: Performed by: INTERNAL MEDICINE

## 2022-11-16 PROCEDURE — 84132 ASSAY OF SERUM POTASSIUM: CPT | Performed by: INTERNAL MEDICINE

## 2022-11-16 PROCEDURE — 82140 ASSAY OF AMMONIA: CPT | Performed by: INTERNAL MEDICINE

## 2022-11-16 PROCEDURE — 83735 ASSAY OF MAGNESIUM: CPT | Performed by: INTERNAL MEDICINE

## 2022-11-16 PROCEDURE — 99232 SBSQ HOSP IP/OBS MODERATE 35: CPT | Performed by: INTERNAL MEDICINE

## 2022-11-16 RX ADMIN — LACTULOSE 10 G: 20 SOLUTION ORAL at 20:12

## 2022-11-16 RX ADMIN — BENZOCAINE 6 MG-MENTHOL 10 MG LOZENGES 1 LOZENGE: at 14:12

## 2022-11-16 RX ADMIN — LACTULOSE 10 G: 20 SOLUTION ORAL at 08:07

## 2022-11-16 RX ADMIN — LACTULOSE 10 G: 20 SOLUTION ORAL at 13:46

## 2022-11-16 RX ADMIN — RIFAXIMIN 550 MG: 550 TABLET ORAL at 08:07

## 2022-11-16 RX ADMIN — PANTOPRAZOLE SODIUM 40 MG: 40 TABLET, DELAYED RELEASE ORAL at 06:45

## 2022-11-16 RX ADMIN — Medication 2000 UNITS: at 08:07

## 2022-11-16 RX ADMIN — ZINC SULFATE 220 MG (50 MG) CAPSULE 220 MG: CAPSULE at 08:07

## 2022-11-16 RX ADMIN — RIFAXIMIN 550 MG: 550 TABLET ORAL at 20:12

## 2022-11-16 ASSESSMENT — ACTIVITIES OF DAILY LIVING (ADL)
ADLS_ACUITY_SCORE: 20

## 2022-11-16 NOTE — PLAN OF CARE
"Care from [id number]    Inpatient Progress Note:  For complete assessment see flow sheet documentation.    /58 (BP Location: Left arm, Cuff Size: Adult Regular)   Pulse 80   Temp 97.5  F (36.4  C) (Temporal)   Resp 16   Ht 1.575 m (5' 2\")   Wt 69.4 kg (153 lb 0.8 oz)   SpO2 97%   BMI 27.99 kg/m         Orientation: A&Ox4  Neuro: WNL  Pain status: Pain free   Activity: Independent  Peripheral edema: NA  Resp: WNL  Cardiac: WNL  GI: On lactulose, Patient had multiple bowel movements today.  :  WNL  Skin: WNL  LDA: PIV   Diet: Regular  Consults: GI  Discharge Plan: discharge home tomorrow    A&Ox4, VS WNL on RA. Lung sounds clear all lobes. 3 BM noted today. No other complaints or concerns were reported. Instructed patient to call with needs. Slept well. No PRN medication given over night. Will continue to monitor and provide cares.     Tory Alvarez RN    "

## 2022-11-16 NOTE — PROGRESS NOTES
"[facility]  [facility]  Gastroenterology Progress note    Interval History:    Feeling well, better than yesterday. No abdominal pain. Had headache yesterday, none today. Has had 4 bowel movements today already.     Vital Signs:      /65 (BP Location: Left arm, Cuff Size: Adult Regular)   Pulse 97   Temp 98.3  F (36.8  C) (Temporal)   Resp 16   Ht 1.575 m (5' 2\")   Wt 69.4 kg (153 lb 0.8 oz)   SpO2 98%   BMI 27.99 kg/m    Temp (24hrs), Av.1  F (36.7  C), Min:97.5  F (36.4  C), Max:98.6  F (37  C)    Patient Vitals for the past 72 hrs:   Weight   22 2131 69.4 kg (153 lb 0.8 oz)     No intake or output data in the 24 hours ending 22 1323      Constitutional: NAD, comfortable, alert and oriented x3  Cardiovascular: RRR, normal S1, S2   Respiratory: CTAB  Abdomen: soft, non-tender, nondistended, +BS  Neuro: No asterixis     Additional Comments:  ROS, FH, SH: See initial GI consult for details.    Laboratory Data:  Recent Labs   Lab Test 22  0547 22  0615 22  0613 10/19/22  0740 22  1452 22  0806 22  1413   WBC 5.2  --  6.1 5.9   < > 4.3  4.3 5.1   HGB 10.7*  --  12.2 12.9   < > 13.0  13.2 12.6   MCV 99  --  98 98   < > 100  100 100   *  --  139* 174   < > 132*  128* 119*   INR  --  1.53*  --   --   --  1.36* 1.35*    < > = values in this interval not displayed.     Recent Labs   Lab Test 11/15/22  0611 22  0547 22  0613 10/19/22  0740   NA  --  141 141 139   POTASSIUM 3.4 3.8 3.6 3.8   CHLORIDE  --  109* 108* 103   CO2  --  22 18* 24   BUN  --  8.5 14.1 7.7   CR  --  0.67 0.72 0.85   ANIONGAP  --  10 15 12   TRAVIS  --  8.8 9.8 9.3     Recent Labs   Lab Test 22  0603 22  0547 22  0950 22  0613 10/19/22  0740 10/18/22  0625 10/17/22  1743 22  1452 22  0806 20  0000 18  0719 18  1826 10/20/17  1819   ALBUMIN  --  3.1*  --  3.7 3.6   < >  --    < " > 3.2*   < > 3.1* 3.7 3.6   BILITOTAL  --  2.0*  --  1.9* 2.8*   < >  --    < > 1.9*   < > 0.6 0.5 0.6   DBIL  --   --   --   --  0.58*  --   --   --  0.6*  --  <0.1  --   --    ALT  --  40*  --  48* 44*   < >  --    < > 42   < > 70* 82* 101*   AST  --  43*  --  51* 73*   < >  --    < > 44   < > 55* 50* 51*   ALKPHOS  --  84  --  106* 101   < >  --    < > 95   < > 67 86 82   PROTEIN Negative  --  Negative  --   --   --  Negative  --   --   --   --   --   --    LIPASE  --   --   --  42  --   --   --   --   --   --   --  140 225    < > = values in this interval not displayed.       CT 11/14/22:  1.  Small splenorenal shunt is suggested.  2.  Increased splenomegaly compared to 1/17/2018.  3.  Nonspecific mild wall thickening at the distal rectum. This could  just relate to nondistention. Correlate with colonoscopy to assess for  any underlying colonic mass.  4.  Indeterminant hypodense nodule at the left kidney. Recommend  correlation with nonurgent renal MRI.     Hepatic Encephalopathy  Assessment: Admitted with hepatic encephalopathy. History of ileocolonic and perianal Crohn's disease, history of surgery.  Last colonoscopy 2020. Also has history of PRINGLE cirrhosis that seems to have been progressing lately with 2 recent admissions for hepatic encephalpathy.  Last EGD June 2022 with small varices.  Had been taking lactulose and rifaximin at home, stopped lactulose but then admitted with asterixis. Possible small splenorenal shunt seen on CT. Per Dr. Chaudhary who discussed CT scan with IR, no treatment needed.      Plan:  -- Continue current lactulose and rifaximin dose  -- We will arrange outpatient colonoscopy, she was not due until 2023 but given CT findings will do sooner although likely artifact.   -- Continue daily zinc supplements    Will discuss with Dr. Puente. Discussed with Dr. Chaudhary.     Cecy Tucker PA-C  [facility]  Cell: [phone] until 12PM  Office: [phone]

## 2022-11-16 NOTE — PROGRESS NOTES
[facility]    Medicine Progress Note - Hospitalist Service    Date of Admission:  11/13/2022    Assessment & Plan      Mercedes Arita is a 57 year old female with history of liver cirrhosis, not EtOH related, PRINGLE, ulcerative colitis, recent hospitalization for hepatic encephalopathy last month, reportedly on maintenance rifaximin and was recently changed from her prior lactulose, who was in her usual state of health until last night when patient's  came home and he found however patient to be disoriented and pleasantly confused.    Problem list:  #1 alteration in mental state presented with confusion and disorientation  #2 hyperammonemia, likely related to hepatic encephalopathy  #3 history of Pringle, liver cirrhosis    -Continue current inpatient care.    Continuing close monitoring and care  -May discontinue neurochecks.  -No clear source of any infectious process.  Urine analysis negative for infection  -Reportedly had a recent abdominal ultrasound last Friday which showed no ascites unlikely to have SBP  -Her mental state appears to be at baseline and normal.  She is awake, alert, oriented to time place and person.    -However she is still demonstrating rising trend of her hyperammonemia currently critically elevated at 109   -Will await further instructions from GI service currently she is on dual therapy for hepatic encephalopathy receiving lactulose and rifaximin   -She is also demonstrating good amount of bowel movement here in the hospital.    -We will repeat ammonia levels in the morning    -CT scan done earlier of the abdomen pelvis care of GI service did show possible small splenorenal shunting, increasing splenomegaly  -GI is discussing patient's finding of small splenorenal shunting with interventional radiology if further intervention is needed      #4 thrombocytopenia-stable, likely related to longstanding history of cirrhosis  -No bleeding tendencies seen here    #5  "history of Crohn's disease, prior bowel resections and duodenal stricture         Diet: Combination Diet Regular Diet Adult    DVT Prophylaxis: Pneumatic Compression Devices  Rueda Catheter: Not present  Central Lines: None  Cardiac Monitoring: None  Code Status: Full Code      Disposition Plan      Expected Discharge Date: Postponing planned discharge today due to critically elevated hyperammonemia and rising trend.        Discharge Comments: Home        The patient's care was discussed with the Bedside Nurse and Patient.    Kirit Wray MD, MD  Hospitalist Service  [facility]  Securely message with the Vocera Web Console (learn more here)  Text page via [employer] Paging/Directory         Clinically Significant Risk Factors              # Hypoalbuminemia: Lowest albumin = 3.1 g/dL (Ref range: 3.5-5.2) at 11/14/2022  5:47 AM, will monitor as appropriate          # Overweight: Estimated body mass index is 27.99 kg/m  as calculated from the following:    Height as of this encounter: 1.575 m (5' 2\").    Weight as of this encounter: 69.4 kg (153 lb 0.8 oz)., PRESENT ON ADMISSION         ______________________________________________________________________    Interval History   Continuing service care today.  Seen and examined.  Chart reviewed.   As always Ms. Carter remained very pleasant, interactive and endorses no new complaints.  Her earlier headache has resolved.  Tolerating oral diet with no nausea or vomiting.  No reported diarrhea.  She had 2-3 bowel movements since yesterday.  Voiding freely.  Ambulating with no assistance.  She is not requiring any oxygen support.   No reported mental status changes overnight.  She is not agitated nor combative.      Data reviewed today: I reviewed all medications, new labs and imaging results over the last 24 hours. I personally reviewed .    Physical Exam   Vital Signs: Temp: 98.3  F (36.8  C) Temp src: Temporal BP: 131/65 Pulse: 97   Resp: 16 " SpO2: 98 % O2 Device: None (Room air)    Weight: 153 lbs .8 oz  HEENT; Atraumatic, normocephalic, pinkish conjuctiva, pupils bilateral reactive   Skin: warm and moist, no rashes  Lymphatics: no cervical or axillary lymphandenopathy  Lungs: equal chest expansion, clear to auscultation, no wheezes, no stridor, no crackles,   Heart: normal rate, normal rhythm, no rubs or gallops.   Abdomen: normal bowel sounds, no tenderness, no peritoneal signs, no guarding  Extremities: no deformities, no edema   Neuro; follow commands, alert and oriented x3, spontaneous speech, coherent, moves all extremities spontaneously  Psych; no hallucination, euthymic mood, not agitated      Data   Recent Labs   Lab 11/15/22  0611 11/14/22  0547 11/13/22  0615 11/13/22  0613   WBC  --  5.2  --  6.1   HGB  --  10.7*  --  12.2   MCV  --  99  --  98   PLT  --  112*  --  139*   INR  --   --  1.53*  --    NA  --  141  --  141   POTASSIUM 3.4 3.8  --  3.6   CHLORIDE  --  109*  --  108*   CO2  --  22  --  18*   BUN  --  8.5  --  14.1   CR  --  0.67  --  0.72   ANIONGAP  --  10  --  15   TRAVIS  --  8.8  --  9.8   GLC  --  83  --  95   ALBUMIN  --  3.1*  --  3.7   PROTTOTAL  --  6.8  --  8.1   BILITOTAL  --  2.0*  --  1.9*   ALKPHOS  --  84  --  106*   ALT  --  40*  --  48*   AST  --  43*  --  51*   LIPASE  --   --   --  42     No results found for this or any previous visit (from the past 24 hour(s)).  Medications       fluticasone  1 spray Both Nostrils Daily     lactulose  10 g Oral TID     pantoprazole  40 mg Oral QAM AC     rifaximin  550 mg Oral BID     sodium chloride (PF)  3 mL Intracatheter Q8H     Vitamin D3  2,000 Units Oral Daily     zinc sulfate  220 mg Oral Daily

## 2022-11-16 NOTE — PLAN OF CARE
Patient vital signs are at baseline: Yes  Patient able to ambulate as they were prior to admission or with assist devices provided by therapies during their stay:  Yes  Patient MUST void prior to discharge:  Yes  Patient able to tolerate oral intake:  Yes  Pain has adequate pain control using Oral analgesics:  Yes  Does patient have an identified :  Yes  Has goal D/C date and time been discussed with patient:  Yes     Pt VSS, independent, tolerating PO, complained of headache, gave prn Tylenol, helped.

## 2022-11-16 NOTE — PLAN OF CARE
Goal Outcome Evaluation:      Plan of Care Reviewed With: patient    Overall Patient Progress: no changeOverall Patient Progress: no change         Pt A&O x4, energy level at baseline per pt, pt frustrated at not being discharged today but states she understands the caution with elevated ammonia levels. Pt continues to have 3-4 bowel movements a day, no reports of n/v, excellent fluid intake. Next ammonia draw scheduled for 0600.    Will continue to monitor and provide cares

## 2022-11-16 NOTE — PROVIDER NOTIFICATION
DATE:  11/16/2022   TIME OF RECEIPT FROM LAB:  0910  LAB TEST:  Ammonia  LAB VALUE:  109  RESULTS GIVEN WITH READ-BACK TO (PROVIDER):   0914    TIME LAB VALUE REPORTED TO PROVIDER:  0915

## 2022-11-17 LAB — AMMONIA PLAS-SCNC: 152 UMOL/L (ref 11–51)

## 2022-11-17 PROCEDURE — 250N000013 HC RX MED GY IP 250 OP 250 PS 637: Performed by: INTERNAL MEDICINE

## 2022-11-17 PROCEDURE — 36415 COLL VENOUS BLD VENIPUNCTURE: CPT | Performed by: INTERNAL MEDICINE

## 2022-11-17 PROCEDURE — 120N000001 HC R&B MED SURG/OB

## 2022-11-17 PROCEDURE — 82140 ASSAY OF AMMONIA: CPT | Performed by: INTERNAL MEDICINE

## 2022-11-17 PROCEDURE — 99232 SBSQ HOSP IP/OBS MODERATE 35: CPT | Performed by: INTERNAL MEDICINE

## 2022-11-17 RX ORDER — LACTULOSE 10 G/15ML
20 SOLUTION ORAL 3 TIMES DAILY
Status: DISCONTINUED | OUTPATIENT
Start: 2022-11-17 | End: 2022-11-18 | Stop reason: HOSPADM

## 2022-11-17 RX ORDER — GUAIFENESIN/DEXTROMETHORPHAN 100-10MG/5
10 SYRUP ORAL EVERY 4 HOURS PRN
Status: DISCONTINUED | OUTPATIENT
Start: 2022-11-17 | End: 2022-11-18 | Stop reason: HOSPADM

## 2022-11-17 RX ADMIN — RIFAXIMIN 550 MG: 550 TABLET ORAL at 19:33

## 2022-11-17 RX ADMIN — PANTOPRAZOLE SODIUM 40 MG: 40 TABLET, DELAYED RELEASE ORAL at 06:32

## 2022-11-17 RX ADMIN — LACTULOSE 20 G: 20 SOLUTION ORAL at 13:46

## 2022-11-17 RX ADMIN — GUAIFENESIN AND DEXTROMETHORPHAN 10 ML: 100; 10 SYRUP ORAL at 21:27

## 2022-11-17 RX ADMIN — RIFAXIMIN 550 MG: 550 TABLET ORAL at 07:47

## 2022-11-17 RX ADMIN — Medication 2000 UNITS: at 07:47

## 2022-11-17 RX ADMIN — ACETAMINOPHEN 650 MG: 325 TABLET, FILM COATED ORAL at 16:54

## 2022-11-17 RX ADMIN — LACTULOSE 10 G: 20 SOLUTION ORAL at 07:47

## 2022-11-17 RX ADMIN — GUAIFENESIN AND DEXTROMETHORPHAN 10 ML: 100; 10 SYRUP ORAL at 16:54

## 2022-11-17 RX ADMIN — ZINC SULFATE 220 MG (50 MG) CAPSULE 220 MG: CAPSULE at 07:47

## 2022-11-17 RX ADMIN — LACTULOSE 20 G: 20 SOLUTION ORAL at 19:33

## 2022-11-17 ASSESSMENT — ACTIVITIES OF DAILY LIVING (ADL)
ADLS_ACUITY_SCORE: 20

## 2022-11-17 NOTE — PROGRESS NOTES
A & O x4, able to make needs known. Tolerating regular diet. Ammonia level 152 this am, provider notified. No change in cognition, pt not confused.  Denies pain. Independent in room, ambulates halls independently. Pt reports several stools this am. Voiding adequately. Plan is to discharge home tomorrow, depending on labs.

## 2022-11-17 NOTE — PROVIDER NOTIFICATION
DATE:  11/17/2022   TIME OF RECEIPT FROM LAB:  0725  LAB TEST:  Ammonia  LAB VALUE:  152  RESULTS GIVEN WITH READ-BACK TO (PROVIDER):   0726     TIME LAB VALUE REPORTED TO PROVIDER:  0771

## 2022-11-17 NOTE — PLAN OF CARE
Pt a/o x4. VSS. Denies pain. Denies nausea. Independent in room. Voiding adequately. Adequate appetite. Continue monitoring stool pattern and cognition.

## 2022-11-17 NOTE — PLAN OF CARE
"Patient vital signs are at baseline: Yes  Patient able to ambulate as they were prior to admission or with assist devices provided by therapies during their stay:  Yes  Patient MUST void prior to discharge:  Yes  Patient able to tolerate oral intake:  Yes  Pain has adequate pain control using Oral analgesics:  Yes  Does patient have an identified :  Yes  Has goal D/C date and time been discussed with patient:  Yes     /68 (BP Location: Left arm)   Pulse 114   Temp 98.5  F (36.9  C) (Oral)   Resp 18   Ht 1.575 m (5' 2\")   Wt 69.4 kg (153 lb 0.8 oz)   SpO2 98%   BMI 27.99 kg/m       A&Ox4. VS WNL. Ambulate independently in room and hallway. Continue on lactulose. No pain or concern reported. Explained cares. Patient verbalized understanding.       " Please send a letter with my note in it and advise the patient to call us back if he would want to see neurology.

## 2022-11-17 NOTE — PROGRESS NOTES
"[facility]  [facility]  Gastroenterology Progress note    Interval History:    Has had 3 stools today already. No melena, hematochezia. No abdominal pain. Alert and oriented x 3, no asterixis.     Vital Signs:      /65 (BP Location: Left arm)   Pulse 103   Temp 99  F (37.2  C) (Temporal)   Resp 20   Ht 1.575 m (5' 2\")   Wt 69.4 kg (153 lb 0.8 oz)   SpO2 99%   BMI 27.99 kg/m    Temp (24hrs), Av.1  F (36.7  C), Min:97.5  F (36.4  C), Max:98.6  F (37  C)    No data found.  No intake or output data in the 24 hours ending 22 1323      Constitutional: NAD, comfortable, alert and oriented x3  Cardiovascular: RRR, normal S1, S2   Respiratory: CTAB  Abdomen: soft, non-tender, nondistended, +BS  Neuro: No asterixis     Additional Comments:  ROS, FH, SH: See initial GI consult for details.    Laboratory Data:  Recent Labs   Lab Test 22  0547 22  0615 22  0613 10/19/22  0740 22  1452 22  0806 22  1413   WBC 5.2  --  6.1 5.9   < > 4.3  4.3 5.1   HGB 10.7*  --  12.2 12.9   < > 13.0  13.2 12.6   MCV 99  --  98 98   < > 100  100 100   *  --  139* 174   < > 132*  128* 119*   INR  --  1.53*  --   --   --  1.36* 1.35*    < > = values in this interval not displayed.     Recent Labs   Lab Test 22  1623 11/15/22  0611 22  0547 22  0613 10/19/22  0740   NA  --   --  141 141 139   POTASSIUM 3.9 3.4 3.8 3.6 3.8   CHLORIDE  --   --  109* 108* 103   CO2  --   --  22 18* 24   BUN  --   --  8.5 14.1 7.7   CR  --   --  0.67 0.72 0.85   ANIONGAP  --   --  10 15 12   TRAVIS  --   --  8.8 9.8 9.3     Recent Labs   Lab Test 22  0603 22  0547 22  0950 22  0613 10/19/22  0740 10/18/22  0625 10/17/22  1743 22  1452 22  0806 20  0000 18  0719 18  1826 10/20/17  1819   ALBUMIN  --  3.1*  --  3.7 3.6   < >  --    < > 3.2*   < > 3.1* 3.7 3.6   BILITOTAL  --  2.0*  --  1.9* 2.8*   " < >  --    < > 1.9*   < > 0.6 0.5 0.6   DBIL  --   --   --   --  0.58*  --   --   --  0.6*  --  <0.1  --   --    ALT  --  40*  --  48* 44*   < >  --    < > 42   < > 70* 82* 101*   AST  --  43*  --  51* 73*   < >  --    < > 44   < > 55* 50* 51*   ALKPHOS  --  84  --  106* 101   < >  --    < > 95   < > 67 86 82   PROTEIN Negative  --  Negative  --   --   --  Negative  --   --   --   --   --   --    LIPASE  --   --   --  42  --   --   --   --   --   --   --  140 225    < > = values in this interval not displayed.       CT 11/14/22:  1.  Small splenorenal shunt is suggested.  2.  Increased splenomegaly compared to 1/17/2018.  3.  Nonspecific mild wall thickening at the distal rectum. This could  just relate to nondistention. Correlate with colonoscopy to assess for  any underlying colonic mass.  4.  Indeterminant hypodense nodule at the left kidney. Recommend  correlation with nonurgent renal MRI.     Hepatic Encephalopathy  Assessment: Admitted with hepatic encephalopathy. History of ileocolonic and perianal Crohn's disease, history of surgery.  Last colonoscopy 2020. Also has history of PRINGLE cirrhosis that seems to have been progressing lately with 2 recent admissions for hepatic encephalpathy.  Last EGD June 2022 with small varices.  Had been taking lactulose and rifaximin at home, stopped lactulose but then admitted with asterixis. Possible small splenorenal shunt seen on CT. Per Dr. Chaudhary who discussed CT scan with IR, no treatment needed.     Ammonia level remains elevated but patient without asterixis, stooling at least 3 times daily, and clinically is alert and oriented x 3. Would not continue to check ammonia at this time as does not correlate well to active encephalopathy.      Plan:  -- Continue current lactulose and rifaximin dose.   -- We will arrange outpatient colonoscopy, she was not due until 2023 but given CT findings will do sooner although likely artifact.   -- Continue daily zinc supplements  -- Ok  to discharge today from GI perspective.   -- Will sign off, please call with questions.     Patient reviewed with Dr. Puente.     Emely Staples, PAC  [facility]  Cell: [phone] until 12PM  Office: [phone]

## 2022-11-17 NOTE — PROGRESS NOTES
[facility]    Medicine Progress Note - Hospitalist Service    Date of Admission:  11/13/2022    Assessment & Plan      Mercedes Arita is a 57 year old female with history of liver cirrhosis, not EtOH related, PRINGLE, ulcerative colitis, recent hospitalization for hepatic encephalopathy last month, reportedly on maintenance rifaximin and was recently changed from her prior lactulose, who was in her usual state of health until last night when patient's  came home and he found however patient to be disoriented and pleasantly confused.    Problem list:  #1 alteration in mental state presented with confusion and disorientation  #2 hyperammonemia, likely related to hepatic encephalopathy  #3 history of Pringle, liver cirrhosis    -Continue current inpatient care.    Continuing close monitoring and care  -May discontinue neurochecks.  -No clear source of any infectious process.  Urine analysis negative for infection  -Reportedly had a recent abdominal ultrasound last Friday which showed no ascites unlikely to have SBP  -Her mental state appears to be at baseline and normal.  She is awake, alert, oriented to time place and person.    -Most challenging situation here is patient still showing rising trend of critical hyperammonemia now at 152  -I am aware and also discussed with patient and family that if patient's mental state remain at baseline and with no ongoing issues such as disorientation, confusion or asterixis.  Then likely no good clinical correlation of her ammonia levels with her current mental state.  -However I also discussed with the patient that we are continuing monitoring her for any possible other causes of her hyperammonemia as she is receiving lactulose and rifaximin here and still showing upward trend of her ammonia levels now remained at a critically elevated  -She and her family is also agreeable to stay at least another day for close monitoring and care.  -I am aware of GI  "instructions not to further check ammonia level but with a rising trend we will continue to check for now.  -She is also concerned regarding her current lactulose dose and wanted to be back on her prior home dosing of 20 g of which I changed today.  -She is also demonstrating good amount of bowel movement here in the hospital.    -We will repeat ammonia levels in the morning    -CT scan done earlier of the abdomen pelvis care of GI service did show possible small splenorenal shunting, increasing splenomegaly  -GI is discussing patient's finding of small splenorenal shunting with interventional radiology if further intervention is needed      #4 thrombocytopenia-stable, likely related to longstanding history of cirrhosis  -No bleeding tendencies seen here    #5 history of Crohn's disease, prior bowel resections and duodenal stricture         Diet: Combination Diet Regular Diet Adult    DVT Prophylaxis: Pneumatic Compression Devices  Rueda Catheter: Not present  Central Lines: None  Cardiac Monitoring: None  Code Status: Full Code      Disposition Plan      Expected Discharge Date: Postponing planned discharge today due to critically elevated hyperammonemia and rising trend.        Discharge Comments: Home      The patient's care was discussed with the Bedside Nurse and Patient.    Kirit Wray MD, MD  Hospitalist Service  [facility]  Securely message with the Vocera Web Console (learn more here)  Text page via [facility] Paging/Directory         Clinically Significant Risk Factors              # Hypoalbuminemia: Lowest albumin = 3.1 g/dL (Ref range: 3.5-5.2) at 11/14/2022  5:47 AM, will monitor as appropriate          # Overweight: Estimated body mass index is 27.99 kg/m  as calculated from the following:    Height as of this encounter: 1.575 m (5' 2\").    Weight as of this encounter: 69.4 kg (153 lb 0.8 oz).      "     ______________________________________________________________________    Interval History   Continuing service care today.  Seen and examined.  Chart reviewed.   As always Ms. Carter remained very pleasant, interactive and endorses no new complaints.    She mentioned that she is ambulatory with no assistance, denies any nausea, vomiting or abdominal pain.  She is voiding freely.  She did have several bowel movement earlier close to 3/day.  No reported bleeding tendencies.  She denies any abdominal pain, spasms, back pain, shortness of breath.  No reported headaches.  No mental status changes as she remained interactive, pleasant, cooperative and oriented to time place and person.  Remained afebrile and not requiring any oxygen support.        Data reviewed today: I reviewed all medications, new labs and imaging results over the last 24 hours. I personally reviewed .    Physical Exam   Vital Signs: Temp: 99  F (37.2  C) Temp src: Temporal BP: 120/65 Pulse: 103   Resp: 20 SpO2: 99 % O2 Device: None (Room air)    Weight: 153 lbs .8 oz  HEENT; Atraumatic, normocephalic, pinkish conjuctiva, pupils bilateral reactive   Skin: warm and moist, no rashes  Lymphatics: no cervical or axillary lymphandenopathy  Lungs: equal chest expansion, clear to auscultation, no wheezes, no stridor, no crackles,   Heart: normal rate, normal rhythm, no rubs or gallops.   Abdomen: normal bowel sounds, no tenderness, no peritoneal signs, no guarding  Extremities: no deformities, no edema   Neuro; follow commands, alert and oriented x3, spontaneous speech, coherent, moves all extremities spontaneously  Psych; no hallucination, euthymic mood, not agitated      Data   Recent Labs   Lab 11/16/22  1623 11/15/22  0611 11/14/22  0547 11/13/22  0615 11/13/22  0613   WBC  --   --  5.2  --  6.1   HGB  --   --  10.7*  --  12.2   MCV  --   --  99  --  98   PLT  --   --  112*  --  139*   INR  --   --   --  1.53*  --    NA  --   --  141  --  141    POTASSIUM 3.9 3.4 3.8  --  3.6   CHLORIDE  --   --  109*  --  108*   CO2  --   --  22  --  18*   BUN  --   --  8.5  --  14.1   CR  --   --  0.67  --  0.72   ANIONGAP  --   --  10  --  15   TRAVIS  --   --  8.8  --  9.8   GLC  --   --  83  --  95   ALBUMIN  --   --  3.1*  --  3.7   PROTTOTAL  --   --  6.8  --  8.1   BILITOTAL  --   --  2.0*  --  1.9*   ALKPHOS  --   --  84  --  106*   ALT  --   --  40*  --  48*   AST  --   --  43*  --  51*   LIPASE  --   --   --   --  42     No results found for this or any previous visit (from the past 24 hour(s)).  Medications       fluticasone  1 spray Both Nostrils Daily     lactulose  20 g Oral TID     pantoprazole  40 mg Oral QAM AC     rifaximin  550 mg Oral BID     sodium chloride (PF)  3 mL Intracatheter Q8H     Vitamin D3  2,000 Units Oral Daily     zinc sulfate  220 mg Oral Daily

## 2022-11-18 VITALS
HEIGHT: 62 IN | RESPIRATION RATE: 16 BRPM | DIASTOLIC BLOOD PRESSURE: 53 MMHG | HEART RATE: 86 BPM | WEIGHT: 153.05 LBS | TEMPERATURE: 97.9 F | SYSTOLIC BLOOD PRESSURE: 107 MMHG | BODY MASS INDEX: 28.16 KG/M2 | OXYGEN SATURATION: 95 %

## 2022-11-18 LAB — AMMONIA PLAS-SCNC: 61 UMOL/L (ref 11–51)

## 2022-11-18 PROCEDURE — 36415 COLL VENOUS BLD VENIPUNCTURE: CPT | Performed by: INTERNAL MEDICINE

## 2022-11-18 PROCEDURE — 250N000013 HC RX MED GY IP 250 OP 250 PS 637: Performed by: INTERNAL MEDICINE

## 2022-11-18 PROCEDURE — 82140 ASSAY OF AMMONIA: CPT | Performed by: INTERNAL MEDICINE

## 2022-11-18 PROCEDURE — 99238 HOSP IP/OBS DSCHRG MGMT 30/<: CPT | Performed by: INTERNAL MEDICINE

## 2022-11-18 RX ORDER — ZINC SULFATE 50(220)MG
220 CAPSULE ORAL DAILY
Qty: 30 CAPSULE | Refills: 1 | Status: SHIPPED | OUTPATIENT
Start: 2022-11-19

## 2022-11-18 RX ADMIN — Medication 2000 UNITS: at 08:38

## 2022-11-18 RX ADMIN — LACTULOSE 20 G: 20 SOLUTION ORAL at 08:38

## 2022-11-18 RX ADMIN — GUAIFENESIN AND DEXTROMETHORPHAN 10 ML: 100; 10 SYRUP ORAL at 04:18

## 2022-11-18 RX ADMIN — GUAIFENESIN AND DEXTROMETHORPHAN 10 ML: 100; 10 SYRUP ORAL at 08:46

## 2022-11-18 RX ADMIN — ACETAMINOPHEN 650 MG: 325 TABLET, FILM COATED ORAL at 14:48

## 2022-11-18 RX ADMIN — PANTOPRAZOLE SODIUM 40 MG: 40 TABLET, DELAYED RELEASE ORAL at 06:33

## 2022-11-18 RX ADMIN — GUAIFENESIN AND DEXTROMETHORPHAN 10 ML: 100; 10 SYRUP ORAL at 12:39

## 2022-11-18 RX ADMIN — ACETAMINOPHEN 650 MG: 325 TABLET, FILM COATED ORAL at 04:18

## 2022-11-18 RX ADMIN — ZINC SULFATE 220 MG (50 MG) CAPSULE 220 MG: CAPSULE at 08:38

## 2022-11-18 RX ADMIN — RIFAXIMIN 550 MG: 550 TABLET ORAL at 08:38

## 2022-11-18 ASSESSMENT — ACTIVITIES OF DAILY LIVING (ADL)
ADLS_ACUITY_SCORE: 20

## 2022-11-18 NOTE — PROGRESS NOTES
SP[facility] Progress Note  RH  647    This was a LOS visit for the .  I introduced myself and Mercedes was very pleasant and excited to be going home today.  She declined a visit but expressed gratitude for stopping in to check on her.    Ofelia Quezada MA  Associate   [phone] - pager

## 2022-11-18 NOTE — PLAN OF CARE
Aox4. Able to express needs well. VSS. On RA. No fever, runny nose, or chills noted. Reported cough, PRN Robitussin x2. Requested PRN Tylenol. Scheduled lactulose given. Abdomen soft, non-tender. Bowel sounds active in all four quads. Reported 8 loose BM during shift. Denies cognitive changes. Up independently. Possible discharge home today if ammonia level stabilize or if no new cognition changes noted.  Calm and pleasant with cares. Will continue to provide supportive care.

## 2022-11-19 NOTE — PLAN OF CARE
Day RN ([phone])    Pt discharged from hospital to home at around 1530.  Reviewed discharge instructions and medications with patient. Questions answered. Patient discharged to home with ride from spouse, discharge instructions, medications (zinc), and belongings. Adequate and eager for discharge.    Patient vital signs are at baseline: Yes  Patient able to ambulate as they were prior to admission or with assist devices provided by therapies during their stay:  Yes  Patient MUST void prior to discharge:  Yes  Patient able to tolerate oral intake:  Yes  Pain has adequate pain control using Oral analgesics:  Yes  Does patient have an identified :  Yes  Has goal D/C date and time been discussed with patient:  Yes    Pt A/O x4.  VSS and afebrile.  Pain managed adequately with prn PO tylenol and robitussin for cold symptoms.  CMS intact.  Skin wdl.  Up independently.  Voiding adequately.  Pt reported 5 bm's this shift.  Tolerating regular diet well.  Ammonia was at 61 today.  Plan is home on discharge.  Will continue to monitor.

## 2022-11-21 NOTE — DISCHARGE SUMMARY
[facility]  Discharge Summary  Name: Mercedes Arita    MRN: [id number]  YOB: 1965    Age: 57 year old  Date of Discharge:  11/18/2022  3:59 PM  Date of Admission: 11/13/2022  Primary Care Provider: Chevy Fonseca  Discharge Physician:  Chad Mckeon M.D  Discharging Service:  Hospitalist      Discharge Diagnosis:  Mercedes Arita is a 57 year old female with history of liver cirrhosis, not EtOH related, PRINGLE, ulcerative colitis, recent hospitalization for hepatic encephalopathy last month, reportedly on maintenance rifaximin and was recently changed from her prior lactulose, who was in her usual state of health until the night before admission, when her  came home and found her disoriented and pleasantly confused.     Problem list:  #1 Hepatic Encephalopathy with hyperammonemia  -She has significant alteration in mental status, presented with confusion and disorientation.  -Her ammonia level was significantly elevated.  -Her mental status improved with appropriate treatment including lactulose and rifaximin  -While her mental status was improving, patient continued to have a rising trend of hyperammonemia and needs to stay in the hospital for close monitoring.  -Ammonia level trended down her mental status remained stable.    #2. liver cirrhosis secondary to PRINGLE.  She was monitored in the hospital, neurochecks remained stable.  -She had recent abdominal ultrasound couple of days prior to admission which did not show any ascites.  -There was no sign of infectious etiology.  -Urinalysis was negative.   -CT scan done earlier of the abdomen pelvis care of GI service did show possible small splenorenal shunting, increasing splenomegaly. GI is discussing patient's finding of small splenorenal shunting with interventional radiology and at this point there was recommendation for intervention.     3. Thrombocytopenia: Secondary to underlying cirrhosis.  -This is stable, has  "longstanding history of cirrhosis.  -No bleeding tendencies seen here     #4.  History of Crohn's disease, prior bowel resections and duodenal stricture.  -No issues and is stable.    I discussed with patient at length the plan of discharge and follow-up.  All her questions and concerns addressed, she showed understanding.        Other Diagnosis:  Overweight.     Discharge Disposition:  Discharged to home     Allergies:  No Known Allergies     Discharge Medications:   Discharge Medication List as of 11/18/2022 12:52 PM      START taking these medications    Details   zinc sulfate (ZINCATE) 220 (50 Zn) MG capsule Take 1 capsule (220 mg) by mouth daily, Disp-30 capsule, R-1, E-Prescribe         CONTINUE these medications which have NOT CHANGED    Details   Cholecalciferol (VITAMIN D3) 50 MCG (2000 UT) CAPS Take 2,000 Units by mouth daily, Historical      cyanocobalamin 1000 MCG/ML injection Inject 1 mL as directed every 30 days., Historical      lactulose (CEPHULAC) 10 GM packet Take 2 packets (20 g) by mouth 2 times daily Titrate doses with goal of 3-4 bowel movements per day, Disp-120 packet, R-0, E-Prescribe      Multiple Vitamins-Minerals (MULTIVITAMIN GUMMIES ADULTS PO) Take 1 chew tab by mouth daily, Historical      omeprazole (PRILOSEC) 20 MG DR capsule Take 20 mg by mouth daily, Historical      rifaximin (XIFAXAN) 550 MG TABS tablet Take 550 mg by mouth 2 times daily, Historical              Condition on Discharge:  Discharge condition: Fair   Discharge vitals: Blood pressure 107/53, pulse 86, temperature 97.9  F (36.6  C), temperature source Temporal, resp. rate 16, height 1.575 m (5' 2\"), weight 69.4 kg (153 lb 0.8 oz), SpO2 95 %, not currently breastfeeding.   Code status on discharge: Full Code     History of Illness:  See detailed admission note for full details.    Significant Physical Exam Findings:  HEENT: Atraumatic, normocephalic, pinkish conjuctiva, pupils bilateral reactive   CHEST: Good air entry " bilateral, no wheezes, no stridor, no crackles,   CVS: Normal rate, normal rhythm, no rubs or gallops.   ABD: normal bowel sounds, no tenderness, no peritoneal signs, no guarding  EXT: No deformities, no edema   NEUR; follow commands, alert and oriented x3, spontaneous speech, coherent, moves all extremities spontaneously  Psych; no hallucination, euthymic mood, not agitated    Procedures other than Imaging:  none     Imaging:  Results for orders placed or performed during the hospital encounter of 11/13/22   Head CT w/o contrast    Narrative    EXAM: CT HEAD W/O CONTRAST  LOCATION: [facility]  DATE/TIME: 11/13/2022 7:29 AM    INDICATION: Altered mental status  COMPARISON: CT head 10/17/2022  TECHNIQUE: Routine CT Head without IV contrast. Multiplanar reformats. Dose reduction techniques were used.    FINDINGS:  INTRACRANIAL CONTENTS: No intracranial hemorrhage, extraaxial collection, or mass effect.  No CT evidence of acute infarct. Normal parenchymal attenuation. Normal ventricles and sulci.     VISUALIZED ORBITS/SINUSES/MASTOIDS: No intraorbital abnormality. No paranasal sinus mucosal disease. No middle ear or mastoid effusion.    BONES/SOFT TISSUES: No acute abnormality.      Impression    IMPRESSION:  1.  No acute intracranial process or significant interval change.   CT Abdomen Pelvis w Contrast    Narrative    CT ABDOMEN AND PELVIS WITH CONTRAST 11/14/2022 1:19 PM    CLINICAL HISTORY: Recurrent hepatic encephalopathy, assess for  splenorenal shunt.    TECHNIQUE: CT scan of the abdomen and pelvis was performed following  injection of IV contrast. Multiplanar reformats were obtained. Dose  reduction techniques were used.  CONTRAST: 70mL [id number]-[id number]    COMPARISON: CT abdomen and pelvis 1/17/2018.    FINDINGS:   LOWER CHEST: Normal.    HEPATOBILIARY: Mildly heterogeneous-appearing hepatic parenchyma. A  few incidental small hepatic cysts. Cholecystectomy.    PANCREAS: Normal.    SPLEEN:  Enlarged spleen is 13 cm, previously 9.1 cm. No focal splenic  lesion is seen. There is a small splenorenal shunt noted anastomosing  with the left renal vein series 2 image 82 and on adjacent images.  This appears to eventually communicate with the splenic vein be a  tortuous small vessels surrounding the spleen and entering the splenic  vein at the splenic hilum.    ADRENAL GLANDS: Normal.    KIDNEYS/BLADDER: No hydronephrosis. No urolithiasis. No acute renal  abnormality. Hypodense nodule is indeterminant at the posterior left  mid kidney measuring 1 cm series 2 image 83. Bladder unremarkable.    BOWEL: Postoperative change of the bowel. No obstruction or acute  inflammatory change. Some mild wall prominence of the transverse colon  likely is as a result of nondistention. There is decompression and  wall thickening at the distal rectum at the mid pelvis level series 2  image 172.    PELVIC ORGANS: No acute abnormality.    ADDITIONAL FINDINGS: None.    MUSCULOSKELETAL: Normal.      Impression    IMPRESSION:   1.  Small splenorenal shunt is suggested.  2.  Increased splenomegaly compared to 1/17/2018.  3.  Nonspecific mild wall thickening at the distal rectum. This could  just relate to nondistention. Correlate with colonoscopy to assess for  any underlying colonic mass.  4.  Indeterminant hypodense nodule at the left kidney. Recommend  correlation with nonurgent renal MRI.    HYADE TOTH MD         SYSTEM ID:  [id number]        Consultations:  Consultation during this admission received from gastroenterology.     Recent Lab Results:  No results for input(s): WBC, HGB, HCT, MCV, PLT in the last 168 hours.  Recent Labs   Lab 11/16/22  1623 11/15/22  0611   POTASSIUM 3.9 3.4   MAG 1.8 2.0     No results for input(s): GLC, BGM in the last 168 hours.  Recent Labs   Lab 11/18/22  0819 11/17/22  0651 11/16/22  0804   ANGELO 61* 152* 109*     No results for input(s): INR in the last 168 hours.       Pending Results:     Unresulted Labs Ordered in the Past 30 Days of this Admission     No orders found from 10/14/2022 to 11/14/2022.              Reason for your hospital stay    Hepatic encephalopathy, liver cirrhosis     Follow-up and recommended labs and tests     Follow up with primary care provider, Chevy Fonseca, within 7 days for hospital follow- up.  The following labs/tests are recommended: CBC, CMP 1 week result to primary care provider.  Follow-up with GI/hepatologist as instructed as an outpatient.     Activity    Your activity upon discharge: activity as tolerated     Diet    Follow this diet upon discharge: Orders Placed This Encounter      Combination Diet Regular Diet Adult         Total time spent in face to face contact with the patient and coordinating discharge was:  >30 Minutes.

## 2022-12-05 ENCOUNTER — TRANSFERRED RECORDS (OUTPATIENT)
Dept: HEALTH INFORMATION MANAGEMENT | Facility: CLINIC | Age: 57
End: 2022-12-05

## 2022-12-05 LAB
ALT SERPL-CCNC: 33 IU/L (ref 0–32)
AST SERPL-CCNC: 47 IU/L (ref 0–40)
CREATININE (EXTERNAL): 0.59 MG/DL (ref 0.57–1)
GFR ESTIMATED (EXTERNAL): 106 ML/MIN/1.73
GLUCOSE (EXTERNAL): 81 MG/DL (ref 70–99)
POTASSIUM (EXTERNAL): 4.2 MMOL/L (ref 3.5–5.2)

## 2023-02-08 ENCOUNTER — TRANSFERRED RECORDS (OUTPATIENT)
Dept: HEALTH INFORMATION MANAGEMENT | Facility: CLINIC | Age: 58
End: 2023-02-08
Payer: COMMERCIAL

## 2023-02-09 ENCOUNTER — TRANSFERRED RECORDS (OUTPATIENT)
Dept: HEALTH INFORMATION MANAGEMENT | Facility: CLINIC | Age: 58
End: 2023-02-09
Payer: COMMERCIAL

## 2023-02-14 ENCOUNTER — HOSPITAL ENCOUNTER (OUTPATIENT)
Dept: MRI IMAGING | Facility: CLINIC | Age: 58
Discharge: HOME | End: 2023-02-14
Attending: INTERNAL MEDICINE | Admitting: INTERNAL MEDICINE
Payer: COMMERCIAL

## 2023-02-14 DIAGNOSIS — R93.3 ABNORMAL CT SCAN, COLON: ICD-10-CM

## 2023-02-14 DIAGNOSIS — R93.429 ABNORMAL CT SCAN, KIDNEY: ICD-10-CM

## 2023-02-14 PROCEDURE — 74183 MRI ABD W/O CNTR FLWD CNTR: CPT

## 2023-02-14 PROCEDURE — A9585 GADOBUTROL INJECTION: HCPCS | Performed by: INTERNAL MEDICINE

## 2023-02-14 PROCEDURE — 255N000002 HC RX 255 OP 636: Performed by: INTERNAL MEDICINE

## 2023-02-14 RX ORDER — GADOBUTROL 604.72 MG/ML
7 INJECTION INTRAVENOUS ONCE
Status: COMPLETED | OUTPATIENT
Start: 2023-02-14 | End: 2023-02-14

## 2023-02-14 RX ADMIN — GADOBUTROL 7 ML: 604.72 INJECTION INTRAVENOUS at 16:07

## 2023-03-30 ENCOUNTER — LAB (OUTPATIENT)
Dept: ONCOLOGY | Facility: CLINIC | Age: 58
End: 2023-03-30
Attending: INTERNAL MEDICINE
Payer: COMMERCIAL

## 2023-03-30 DIAGNOSIS — D69.6 THROMBOCYTOPENIA (H): ICD-10-CM

## 2023-03-30 DIAGNOSIS — E61.0 COPPER DEFICIENCY: ICD-10-CM

## 2023-03-30 LAB
ALBUMIN SERPL BCG-MCNC: 3.6 G/DL (ref 3.5–5.2)
ALP SERPL-CCNC: 135 U/L (ref 35–104)
ALT SERPL W P-5'-P-CCNC: 36 U/L (ref 10–35)
ANION GAP SERPL CALCULATED.3IONS-SCNC: 10 MMOL/L (ref 7–15)
AST SERPL W P-5'-P-CCNC: 47 U/L (ref 10–35)
BASOPHILS # BLD AUTO: 0.1 10E3/UL (ref 0–0.2)
BASOPHILS NFR BLD AUTO: 1 %
BILIRUB SERPL-MCNC: 1.6 MG/DL
BUN SERPL-MCNC: 6.8 MG/DL (ref 6–20)
CALCIUM SERPL-MCNC: 9.4 MG/DL (ref 8.6–10)
CHLORIDE SERPL-SCNC: 104 MMOL/L (ref 98–107)
CREAT SERPL-MCNC: 0.62 MG/DL (ref 0.51–0.95)
DEPRECATED HCO3 PLAS-SCNC: 25 MMOL/L (ref 22–29)
EOSINOPHIL # BLD AUTO: 0.2 10E3/UL (ref 0–0.7)
EOSINOPHIL NFR BLD AUTO: 4 %
ERYTHROCYTE [DISTWIDTH] IN BLOOD BY AUTOMATED COUNT: 15.8 % (ref 10–15)
GFR SERPL CREATININE-BSD FRML MDRD: >90 ML/MIN/1.73M2
GLUCOSE SERPL-MCNC: 124 MG/DL (ref 70–99)
HCT VFR BLD AUTO: 37.9 % (ref 35–47)
HGB BLD-MCNC: 11.8 G/DL (ref 11.7–15.7)
IMM GRANULOCYTES # BLD: 0 10E3/UL
IMM GRANULOCYTES NFR BLD: 0 %
LYMPHOCYTES # BLD AUTO: 1.6 10E3/UL (ref 0.8–5.3)
LYMPHOCYTES NFR BLD AUTO: 32 %
MCH RBC QN AUTO: 30.3 PG (ref 26.5–33)
MCHC RBC AUTO-ENTMCNC: 31.1 G/DL (ref 31.5–36.5)
MCV RBC AUTO: 97 FL (ref 78–100)
MONOCYTES # BLD AUTO: 0.4 10E3/UL (ref 0–1.3)
MONOCYTES NFR BLD AUTO: 8 %
NEUTROPHILS # BLD AUTO: 2.8 10E3/UL (ref 1.6–8.3)
NEUTROPHILS NFR BLD AUTO: 55 %
NRBC # BLD AUTO: 0 10E3/UL
NRBC BLD AUTO-RTO: 0 /100
PLATELET # BLD AUTO: 115 10E3/UL (ref 150–450)
POTASSIUM SERPL-SCNC: 4 MMOL/L (ref 3.4–5.3)
PROT SERPL-MCNC: 7.7 G/DL (ref 6.4–8.3)
RBC # BLD AUTO: 3.89 10E6/UL (ref 3.8–5.2)
RETICS # AUTO: 0.12 10E6/UL (ref 0.03–0.1)
RETICS/RBC NFR AUTO: 3.1 % (ref 0.5–2)
SODIUM SERPL-SCNC: 139 MMOL/L (ref 136–145)
WBC # BLD AUTO: 5.1 10E3/UL (ref 4–11)

## 2023-03-30 PROCEDURE — 85045 AUTOMATED RETICULOCYTE COUNT: CPT | Performed by: INTERNAL MEDICINE

## 2023-03-30 PROCEDURE — 80053 COMPREHEN METABOLIC PANEL: CPT | Performed by: INTERNAL MEDICINE

## 2023-03-30 PROCEDURE — 36415 COLL VENOUS BLD VENIPUNCTURE: CPT

## 2023-03-30 PROCEDURE — 85025 COMPLETE CBC W/AUTO DIFF WBC: CPT | Performed by: INTERNAL MEDICINE

## 2023-03-30 NOTE — PROGRESS NOTES
Medical Assistant Note:  Mercedes Romeroin presents today for blood draw.    Patient seen by provider today: No.   present during visit today: Not Applicable.    Concerns: No Concerns.    Procedure:  Lab draw site: left antecub, Needle type: butterfly, Gauge: 23.    Post Assessment:  Labs drawn without difficulty: Yes.    Discharge Plan:  Departure Mode: Ambulatory.    Face to Face Time: 10.    Nallely Ortiz, CMA

## 2023-03-31 LAB
PATH REPORT.COMMENTS IMP SPEC: NORMAL
PATH REPORT.COMMENTS IMP SPEC: NORMAL
PATH REPORT.FINAL DX SPEC: NORMAL
PATH REPORT.MICROSCOPIC SPEC OTHER STN: NORMAL
PATH REPORT.MICROSCOPIC SPEC OTHER STN: NORMAL
PATH REPORT.RELEVANT HX SPEC: NORMAL

## 2023-03-31 PROCEDURE — 99207 BLOOD MORPHOLOGY PATHOLOGIST REVIEW: CPT | Performed by: PATHOLOGY

## 2023-04-03 NOTE — PROGRESS NOTES
[facility]    Hematology/Oncology Established Patient Follow-up Note    Treatment Summary:      Today's Date: 4/4/23    Reason for Follow-up: RBC abnormality  Referring Provider: Dr. Chevy Fonseca      HISTORY OF PRESENT ILLNESS: Mercedes Arita is a 58 year old female who is referred to clinic for RBC abnormality and thrombocytopenia. Past medical history is significant for Crohn's disease on 6-MPH and PRINGLE.      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 She had her first small bowel resection in 1987. She has had a total of three small bowel resections in her lifetime with the most recent in 2012. She is followed by GI and is evaluated once yearly at this point. She has been on and off 6-MP therapy for the past 35 years. She has not required steroid pulses or hospitalization in decades. She was attempted once on remicade, but had severe non-anaphylactic reaction to this and was never trialed again on biologics. 6-MP has been held since September due to CBC.      Recently, she had an MRE performed due to persistent liver enzymes, which showed advancement in fibrosis. Her most recent colonoscopy was only a year ago without abnormality. No blood in stools, fatigue, unintentional weight loss. No personal or family history of malignancy.     In January 2021, WBC 4.4, Hb 12.6, , , AST 90, ALT 46, T. Bili 1.0, D. Bili 0.2, total protein 7.8, serum albumin 3.5.      In April 2021, WBC 4.1, Hb 12.2, , , AST 42, ALT 48, total protein 7.4, serum albumin 3.4, total bili 0.6, d. Bili 0.3.      In June 2021, WBC 4.6, Hb 12.2, , , AST 38, ALT 41, total protein 7.5, serum albumin 3.4, total bili 1.0, d. Bili 0.3.     In September 2021, WBC 4.6, Hb 11.9, , , AST 39, ALT 30, T. Bili 1.2, D. Bili 0.38.     On 10/5/21: WBC 5.4, Hb 12, , , normal diff.       INTERIM HISTORY:  Patient has had multiple hospitalizations for hepatic  encephalopathy. She is on lactulose and rifaximin. She is A&Ox4 in clinic today. No pain. No bleed.       REVIEW OF SYSTEMS:   A 14 point ROS was reviewed with pertinent positives and negatives in the HPI.       HOME MEDICATIONS:  Current Outpatient Medications   Medication Sig Dispense Refill     Cholecalciferol (VITAMIN D3) 50 MCG (2000 UT) CAPS Take 2,000 Units by mouth daily       cyanocobalamin 1000 MCG/ML injection Inject 1 mL as directed every 30 days.       lactulose (CEPHULAC) 10 GM packet Take 2 packets (20 g) by mouth 2 times daily Titrate doses with goal of 3-4 bowel movements per day 120 packet 0     Multiple Vitamins-Minerals (MULTIVITAMIN GUMMIES ADULTS PO) Take 1 chew tab by mouth daily       omeprazole (PRILOSEC) 20 MG DR capsule Take 20 mg by mouth daily       rifaximin (XIFAXAN) 550 MG TABS tablet Take 550 mg by mouth 2 times daily       zinc sulfate (ZINCATE) 220 (50 Zn) MG capsule Take 1 capsule (220 mg) by mouth daily 30 capsule 1         ALLERGIES:  No Known Allergies      PAST MEDICAL HISTORY:  Past Medical History:   Diagnosis Date     Chronic pain     Abdominal pain     Crohn's     Chrons     Crohn's disease (H)     diagnosed 1983     PONV (postoperative nausea and vomiting)          PAST SURGICAL HISTORY:  Past Surgical History:   Procedure Laterality Date     CHOLECYSTECTOMY       DILATE RECTUM  9/19/2012    Procedure: DILATE RECTUM;  Exam Under Anesthesia, Dilation with Stricture, lysis of adhesions, Laparoscopic hand assisted Ileocolic Resection ;  Surgeon: Magy Macias MD;  Location:  OR     GASTROSCOPY N/A 1/18/2018    Procedure: GASTROSCOPY;  GASTROSCOPY WITH balloon DILATION of duodenal stricture up to 13.5mm UNDER FLUOROSCOPY (MAC SEDATION) ;  Surgeon: Aaliyah Langley MD;  Location:  OR     GI SURGERY  1987,1996,2012    bowel resection times 3 ileo-colonic resections     HERNIA REPAIR      Ventral times 2 with mesh.     LAPAROSCOPIC RESECTION ILEOCECAL   "2012    Procedure: LAPAROSCOPIC RESECTION ILEOCECAL;;  Surgeon: Magy Macias MD;  Location: [facility] OR     LAPAROTOMY EXPLORATORY      For removal of adhesions.     MOHS MICROGRAPHIC PROCEDURE      Removal of skin cancer on face.     ORTHOPEDIC SURGERY      carpal tunnel         SOCIAL HISTORY:  Social History     Socioeconomic History     Marital status: Single     Spouse name: Not on file     Number of children: Not on file     Years of education: Not on file     Highest education level: Not on file   Occupational History     Not on file   Tobacco Use     Smoking status: Never     Smokeless tobacco: Never   Vaping Use     Vaping status: Not on file   Substance and Sexual Activity     Alcohol use: Yes     Comment: 2 times yearly.     Drug use: No     Sexual activity: Yes     Partners: Male   Other Topics Concern     Parent/sibling w/ CABG, MI or angioplasty before 65F 55M? Not Asked   Social History Narrative     Not on file     Social Determinants of Health     Financial Resource Strain: Not on file   Food Insecurity: Not on file   Transportation Needs: Not on file   Physical Activity: Not on file   Stress: Not on file   Social Connections: Not on file   Intimate Partner Violence: Not At Risk (10/4/2022)    Humiliation, Afraid, Rape, and Kick questionnaire      Fear of Current or Ex-Partner: No      Emotionally Abused: No      Physically Abused: No      Sexually Abused: No   Housing Stability: Not on file         FAMILY HISTORY:  No family history on file.      PHYSICAL EXAM:  Vital signs:  /78   Pulse 88   Temp 98.2  F (36.8  C) (Oral)   Resp 16   Ht 1.575 m (5' 2\")   Wt 74.5 kg (164 lb 3.2 oz)   SpO2 98%   BMI 30.03 kg/m     ECO  GENERAL/CONSTITUTIONAL: No acute distress.  LYMPH: No anterior cervical, posterior cervical, supraclavicular, axillary or inguinal adenopathy.   RESPIRATORY: Clear to auscultation bilaterally. No crackles or wheezing.   CARDIOVASCULAR: Regular rate and rhythm " without murmurs, gallops, or rubs.  GASTROINTESTINAL: No hepatosplenomegaly, masses, or tenderness. The patient has normal bowel sounds. No guarding.  No distention.  MUSCULOSKELETAL: Warm and well-perfused, no cyanosis, clubbing, or edema.  NEUROLOGIC: Cranial nerves II-XII are intact. Alert, oriented, answers questions appropriately. No asterixis.   INTEGUMENTARY: No rashes or jaundice.  GAIT: Steady, does not use assistive device.      LABS:   Latest Reference Range & Units 03/30/23 15:33   Sodium 136 - 145 mmol/L 139   Potassium 3.4 - 5.3 mmol/L 4.0   Chloride 98 - 107 mmol/L 104   Carbon Dioxide (CO2) 22 - 29 mmol/L 25   Urea Nitrogen 6.0 - 20.0 mg/dL 6.8   Creatinine 0.51 - 0.95 mg/dL 0.62   GFR Estimate >60 mL/min/1.73m2 >90   Calcium 8.6 - 10.0 mg/dL 9.4   Anion Gap 7 - 15 mmol/L 10   Albumin 3.5 - 5.2 g/dL 3.6   Protein Total 6.4 - 8.3 g/dL 7.7   Alkaline Phosphatase 35 - 104 U/L 135 (H)   ALT 10 - 35 U/L 36 (H)   AST 10 - 35 U/L 47 (H)   Bilirubin Total <=1.2 mg/dL 1.6 (H)   Glucose 70 - 99 mg/dL 124 (H)   WBC 4.0 - 11.0 10e3/uL 5.1   Hemoglobin 11.7 - 15.7 g/dL 11.8   Hematocrit 35.0 - 47.0 % 37.9   Platelet Count 150 - 450 10e3/uL 115 (L)   RBC Count 3.80 - 5.20 10e6/uL 3.89   MCV 78 - 100 fL 97   MCH 26.5 - 33.0 pg 30.3   MCHC 31.5 - 36.5 g/dL 31.1 (L)   RDW 10.0 - 15.0 % 15.8 (H)   % Neutrophils % 55   % Lymphocytes % 32   % Monocytes % 8   % Eosinophils % 4   % Basophils % 1   Absolute Basophils 0.0 - 0.2 10e3/uL 0.1   Absolute Eosinophils 0.0 - 0.7 10e3/uL 0.2   Absolute Immature Granulocytes <=0.4 10e3/uL 0.0   Absolute Lymphocytes 0.8 - 5.3 10e3/uL 1.6   Absolute Monocytes 0.0 - 1.3 10e3/uL 0.4   % Immature Granulocytes % 0   Absolute Neutrophils 1.6 - 8.3 10e3/uL 2.8   Absolute NRBCs 10e3/uL 0.0   NRBCs per 100 WBC <1 /100 0   % Retic 0.5 - 2.0 % 3.1 (H)   Absolute Retic 0.025 - 0.095 10e6/uL 0.120 (H)           Ferritin Latest Ref Range: 8 - 252 ng/mL 62   Folate Latest Ref Range: >=5.4 ng/mL  25.4   Iron Latest Ref Range: 35 - 180 ug/dL 96   Iron Binding Cap Latest Ref Range: 240 - 430 ug/dL 385   Iron Saturation Index Latest Ref Range: 15 - 46 % 25   Lactate Dehydrogenase Latest Ref Range: 81 - 234 U/L 254 (H)   Vitamin B12 Latest Ref Range: 193 - 986 pg/mL 589           Ref. Range 12/27/2021 13:53   MAR  Broad Spectrum Latest Ref Range: Negative  NEG   Haptoglobin Latest Ref Range: 32 - 197 mg/dL <3 (L)           Ref. Range 11/12/2021 12:04   Copper Latest Ref Range: 80.0 - 155.0 ug/dL 72.3 (L)   Folate Latest Ref Range: >=5.4 ng/mL 35.1   Lactate Dehydrogenase Latest Ref Range: 81 - 234 U/L 277 (H)   Vitamin B12 Latest Ref Range: 193 - 986 pg/mL 655           Ref. Range 11/12/2021 12:04   INR Latest Ref Range: 0.85 - 1.15  1.30 (H)   PTT Latest Ref Range: 22 - 38 Seconds 36   Fibrinogen Latest Ref Range: 170 - 490 mg/dL 300           Ref. Range 11/12/2021 12:04   Haptoglobin Latest Ref Range: 32 - 197 mg/dL <3 (L)           Ref. Range 11/12/2021 12:04   Hep B Surface Agn Latest Ref Range: Nonreactive  Nonreactive   Hepatitis A Antibody IgG Latest Ref Range: Nonreactive  Nonreactive   Hepatitis B Core Odilia Latest Ref Range: Nonreactive  Nonreactive   Hepatitis C Antibody Latest Ref Range: Nonreactive  Nonreactive   HIV Antigen Antibody Combo Latest Ref Range: Nonreactive  Nonreactive        Ref. Range 2/26/2022 09:43 2/26/2022 09:47   Copper Latest Ref Range: 80.0 - 155.0 ug/dL 62.0 (L)     Copper Random Urine Latest Ref Range: <=3.2 ug/dL   <1.0   Creatinine Urine/Volume Latest Units: mg/dL   44   Creatinine Urine/24hr Latest Ref Range: 500 - 1400 mg/d   770         PATHOLOGY:  Peripheral Smear 12/27/21:     The red cells appear normochromic.  Poikilocytosis is minimal. Polychromasia is not increased. Rouleaux formation is not increased. The morphology of the platelets is normal. There is no significant platelet clumping. Leukocytes are quantitatively normal and morphologically unremarkable.           Final Diagnosis 2/23/22:   Peripheral blood demonstrating thrombocytopenia with increased polychromasia             Final Diagnosis 1/12/22:   A. Liver, Biopsy:  - Steatohepatitis (NAFLD activity score = 6 of 8)  - Cirrhosis, stage 4.   Electronically signed by Zen Munoz MD PhD on 1/14/2022 at  2:25 PM   Clinical Information     History of fatty liver with biopsy 2013, now with MRI with concern for cirrhosis         Final Diagnosis 9/28/22:   Peripheral blood, morphology:  - Mild thrombocytopenia.  - Hemoglobin quantitatively within normal limits; erythrocytes macrocytic and with polychromasia.  - WBC subsets quantitatively within normal limits and without diagnostic morphologic abnormality.  - Negative for schistocytes.  - Negative for overt features of myelodysplasia or circulating blasts.         IMAGING:  US ABDOMEN LIMITED 3/25/2022:                                                       IMPRESSION: Normal appearance of the spleen. Normal size measuring 9.7 cm.     US ABDOMEN LIMITED 5/11/22:  IMPRESSION:  1.  Diffuse hepatic steatosis. No focal hepatic masses.    MRI ENTEROGRAPHY 7/27/22:  IMPRESSION:   1. Mild wall thickening and edema at the neoterminal ileum without  enhancement. This may be exaggerated by this segment being  decompressed. Findings could represent mild active inflammatory  Crohn's ileitis. No strictures, fistula or bowel obstruction or  abscess. Small bowel resection with ileoascending anastomosis and  short length of small bowel.     2. Heterogeneous hepatic parenchymal signal and enhancement, likely  related to known hepatic fibrosis. This is not fully evaluated on the  available sequences.    CT Head 10/17/22:                                                                 IMPRESSION:   HEAD CT:  1.  No acute intracranial abnormality.     HEAD CTA:   1.  Normal CTA [place] [facility] [place].     NECK CTA:  1.  Normal neck CTA.     CT PERFUSION:  1.  Normal cerebral perfusion.     2.   Findings called to Dr. Smith in the [facility] emergency room at 5:10 PM on 10/17/2022.    CT Head 11/13/22:  IMPRESSION:  1.  No acute intracranial process or significant interval change.    CT A/P 11/14/22:  IMPRESSION:   1.  Small splenorenal shunt is suggested.  2.  Increased splenomegaly compared to 1/17/2018.  3.  Nonspecific mild wall thickening at the distal rectum. This could  just relate to nondistention. Correlate with colonoscopy to assess for  any underlying colonic mass.  4.  Indeterminant hypodense nodule at the left kidney. Recommend  correlation with nonurgent renal MRI.    MRI Abdomen 2/14/23:  IMPRESSION: The exam is mildly limited by motion/respiratory  artifacts, within this limitation, there is;  1. Subcentimeter focus of mild T1 hyperintensity and no convincing  evidence of enhancement in the mid/upper pole right kidney, which  corresponds to the indeterminate focus seen on 11/14/2022 exam,  remains too small to characterize, could represent cyst with  hemorrhagic/proteinaceous component, recommend follow-up exam with  renal ultrasound in six months to confirm long-term stability and its  cystic nature.  2. Mild diffuse signal dropout on out of phase images, suggesting  hepatic steatosis.  3. Mild splenomegaly.       ASSESSMENT/PLAN:  Mercedes Arita is a 58 year old female who is referred to clinic for RBC abnormality and thrombocytopenia. Past medical history is significant for Crohn's disease on 6-MPH and PRINGLE.      Patient has had significant small bowel resection throughout her lifetime due to Crohn's disease and likely has underlying marrow suppression due to poor absorption, PRINGLE, and long-term 6-MP therapy. Her recent labs have returned showing copper deficiency. She has evidence of reticulocytosis on smear with undetectable haptoglobin. Patient continues on monthly B12 self-injections as prophylaxis. HIV and hepatitis studies are negative.     Patient does not have evidence of anemia and  has improvement in macrocytosis. She continues to have decreased copper levels despite supplementation. Urine copper returned normal.      Liver biopsy on 1/12/22 returned with steatohepatitis NAFLD score of 6 and cirrhosis, stage 4.     Abdominal U/S 3/25/22 negative for splenomegaly.     I discussed with the patient continued thrombocytopenia and coagulopathy are most likely due to underlying cirrhosis and possible splenic sequestration. Additionally, there is likely a component of bone marrow suppression. However, she continues to have evidence of elevated T. Bili, elevated LDH, and undetectable haptoglobin, which can be due to a combination of copper deficiency and underlying cirrhosis. We continue to discuss the possibility of a bone marrow biopsy to rule out an underlying marrow disorder, but will hold at this time.      Will stop copper supplementation at this time as this has not made significant difference.     CBC is stable with normal WBC, Hb, and platelets stable at 115K.     -Patient instructed to notify clinic with any CBC abnormalities that are concerning to her and will see her sooner. Otherwise, repeat CBC in 12 months prior to follow up in clinic.    -She will continue to have close follow up with GI.        Destiny Powell,   Hematology/Oncology  [facility]

## 2023-04-04 ENCOUNTER — ONCOLOGY VISIT (OUTPATIENT)
Dept: ONCOLOGY | Facility: CLINIC | Age: 58
End: 2023-04-04
Attending: INTERNAL MEDICINE
Payer: COMMERCIAL

## 2023-04-04 VITALS
OXYGEN SATURATION: 98 % | HEART RATE: 88 BPM | HEIGHT: 62 IN | TEMPERATURE: 98.2 F | DIASTOLIC BLOOD PRESSURE: 78 MMHG | WEIGHT: 164.2 LBS | RESPIRATION RATE: 16 BRPM | SYSTOLIC BLOOD PRESSURE: 132 MMHG | BODY MASS INDEX: 30.22 KG/M2

## 2023-04-04 DIAGNOSIS — D69.6 THROMBOCYTOPENIA (H): ICD-10-CM

## 2023-04-04 PROCEDURE — G0463 HOSPITAL OUTPT CLINIC VISIT: HCPCS | Performed by: INTERNAL MEDICINE

## 2023-04-04 PROCEDURE — 99214 OFFICE O/P EST MOD 30 MIN: CPT | Performed by: INTERNAL MEDICINE

## 2023-04-04 RX ORDER — VITAMIN B COMPLEX
1 TABLET ORAL DAILY
COMMUNITY

## 2023-04-04 ASSESSMENT — PAIN SCALES - GENERAL: PAINLEVEL: NO PAIN (0)

## 2023-04-04 NOTE — LETTER
4/4/2023         RE: Mercedes Arita  [address] [facility] [place] [id number]        Dear Colleague,    Thank you for referring your patient, Mercedes Arita, to the [facility]. Please see a copy of my visit note below.    [facility]    Hematology/Oncology Established Patient Follow-up Note    Treatment Summary:      Today's Date: 4/4/23    Reason for Follow-up: RBC abnormality  Referring Provider: Dr. Chevy Fonseca      HISTORY OF PRESENT ILLNESS: Mercedes Arita is a 58 year old female who is referred to clinic for RBC abnormality and thrombocytopenia. Past medical history is significant for Crohn's disease on 6-MPH and PRINGLE.      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 She had her first small bowel resection in 1987. She has had a total of three small bowel resections in her lifetime with the most recent in 2012. She is followed by GI and is evaluated once yearly at this point. She has been on and off 6-MP therapy for the past 35 years. She has not required steroid pulses or hospitalization in decades. She was attempted once on remicade, but had severe non-anaphylactic reaction to this and was never trialed again on biologics. 6-MP has been held since September due to CBC.      Recently, she had an MRE performed due to persistent liver enzymes, which showed advancement in fibrosis. Her most recent colonoscopy was only a year ago without abnormality. No blood in stools, fatigue, unintentional weight loss. No personal or family history of malignancy.     In January 2021, WBC 4.4, Hb 12.6, , , AST 90, ALT 46, T. Bili 1.0, D. Bili 0.2, total protein 7.8, serum albumin 3.5.      In April 2021, WBC 4.1, Hb 12.2, , , AST 42, ALT 48, total protein 7.4, serum albumin 3.4, total bili 0.6, d. Bili 0.3.      In June 2021, WBC 4.6, Hb 12.2, , , AST 38, ALT 41, total protein 7.5, serum albumin 3.4, total bili 1.0, d.  Bili 0.3.     In September 2021, WBC 4.6, Hb 11.9, , , AST 39, ALT 30, T. Bili 1.2, D. Bili 0.38.     On 10/5/21: WBC 5.4, Hb 12, , , normal diff.       INTERIM HISTORY:  Patient has had multiple hospitalizations for hepatic encephalopathy. She is on lactulose and rifaximin. She is A&Ox4 in clinic today. No pain. No bleed.       REVIEW OF SYSTEMS:   A 14 point ROS was reviewed with pertinent positives and negatives in the HPI.       HOME MEDICATIONS:  Current Outpatient Medications   Medication Sig Dispense Refill     Cholecalciferol (VITAMIN D3) 50 MCG (2000 UT) CAPS Take 2,000 Units by mouth daily       cyanocobalamin 1000 MCG/ML injection Inject 1 mL as directed every 30 days.       lactulose (CEPHULAC) 10 GM packet Take 2 packets (20 g) by mouth 2 times daily Titrate doses with goal of 3-4 bowel movements per day 120 packet 0     Multiple Vitamins-Minerals (MULTIVITAMIN GUMMIES ADULTS PO) Take 1 chew tab by mouth daily       omeprazole (PRILOSEC) 20 MG DR capsule Take 20 mg by mouth daily       rifaximin (XIFAXAN) 550 MG TABS tablet Take 550 mg by mouth 2 times daily       zinc sulfate (ZINCATE) 220 (50 Zn) MG capsule Take 1 capsule (220 mg) by mouth daily 30 capsule 1         ALLERGIES:  No Known Allergies      PAST MEDICAL HISTORY:  Past Medical History:   Diagnosis Date     Chronic pain     Abdominal pain     Crohn's     Chrons     Crohn's disease (H)     diagnosed 1983     PONV (postoperative nausea and vomiting)          PAST SURGICAL HISTORY:  Past Surgical History:   Procedure Laterality Date     CHOLECYSTECTOMY       DILATE RECTUM  9/19/2012    Procedure: DILATE RECTUM;  Exam Under Anesthesia, Dilation with Stricture, lysis of adhesions, Laparoscopic hand assisted Ileocolic Resection ;  Surgeon: Magy Macias MD;  Location: [facility] OR     GASTROSCOPY N/A 1/18/2018    Procedure: GASTROSCOPY;  GASTROSCOPY WITH balloon DILATION of duodenal stricture up to 13.5mm UNDER  "FLUOROSCOPY (MAC SEDATION) ;  Surgeon: Aaliyah Langley MD;  Location: [facility] OR     GI SURGERY  1987,1996,2012    bowel resection times 3 ileo-colonic resections     HERNIA REPAIR      Ventral times 2 with mesh.     LAPAROSCOPIC RESECTION ILEOCECAL  9/19/2012    Procedure: LAPAROSCOPIC RESECTION ILEOCECAL;;  Surgeon: Magy Macias MD;  Location: [facility] OR     LAPAROTOMY EXPLORATORY      For removal of adhesions.     MOHS MICROGRAPHIC PROCEDURE      Removal of skin cancer on face.     ORTHOPEDIC SURGERY      carpal tunnel         SOCIAL HISTORY:  Social History     Socioeconomic History     Marital status: Single     Spouse name: Not on file     Number of children: Not on file     Years of education: Not on file     Highest education level: Not on file   Occupational History     Not on file   Tobacco Use     Smoking status: Never     Smokeless tobacco: Never   Vaping Use     Vaping status: Not on file   Substance and Sexual Activity     Alcohol use: Yes     Comment: 2 times yearly.     Drug use: No     Sexual activity: Yes     Partners: Male   Other Topics Concern     Parent/sibling w/ CABG, MI or angioplasty before 65F 55M? Not Asked   Social History Narrative     Not on file     Social Determinants of Health     Financial Resource Strain: Not on file   Food Insecurity: Not on file   Transportation Needs: Not on file   Physical Activity: Not on file   Stress: Not on file   Social Connections: Not on file   Intimate Partner Violence: Not At Risk (10/4/2022)    Humiliation, Afraid, Rape, and Kick questionnaire      Fear of Current or Ex-Partner: No      Emotionally Abused: No      Physically Abused: No      Sexually Abused: No   Housing Stability: Not on file         FAMILY HISTORY:  No family history on file.      PHYSICAL EXAM:  Vital signs:  /78   Pulse 88   Temp 98.2  F (36.8  C) (Oral)   Resp 16   Ht 1.575 m (5' 2\")   Wt 74.5 kg (164 lb 3.2 oz)   SpO2 98%   BMI 30.03 kg/m   "   ECO  GENERAL/CONSTITUTIONAL: No acute distress.  LYMPH: No anterior cervical, posterior cervical, supraclavicular, axillary or inguinal adenopathy.   RESPIRATORY: Clear to auscultation bilaterally. No crackles or wheezing.   CARDIOVASCULAR: Regular rate and rhythm without murmurs, gallops, or rubs.  GASTROINTESTINAL: No hepatosplenomegaly, masses, or tenderness. The patient has normal bowel sounds. No guarding.  No distention.  MUSCULOSKELETAL: Warm and well-perfused, no cyanosis, clubbing, or edema.  NEUROLOGIC: Cranial nerves II-XII are intact. Alert, oriented, answers questions appropriately. No asterixis.   INTEGUMENTARY: No rashes or jaundice.  GAIT: Steady, does not use assistive device.      LABS:   Latest Reference Range & Units 23 15:33   Sodium 136 - 145 mmol/L 139   Potassium 3.4 - 5.3 mmol/L 4.0   Chloride 98 - 107 mmol/L 104   Carbon Dioxide (CO2) 22 - 29 mmol/L 25   Urea Nitrogen 6.0 - 20.0 mg/dL 6.8   Creatinine 0.51 - 0.95 mg/dL 0.62   GFR Estimate >60 mL/min/1.73m2 >90   Calcium 8.6 - 10.0 mg/dL 9.4   Anion Gap 7 - 15 mmol/L 10   Albumin 3.5 - 5.2 g/dL 3.6   Protein Total 6.4 - 8.3 g/dL 7.7   Alkaline Phosphatase 35 - 104 U/L 135 (H)   ALT 10 - 35 U/L 36 (H)   AST 10 - 35 U/L 47 (H)   Bilirubin Total <=1.2 mg/dL 1.6 (H)   Glucose 70 - 99 mg/dL 124 (H)   WBC 4.0 - 11.0 10e3/uL 5.1   Hemoglobin 11.7 - 15.7 g/dL 11.8   Hematocrit 35.0 - 47.0 % 37.9   Platelet Count 150 - 450 10e3/uL 115 (L)   RBC Count 3.80 - 5.20 10e6/uL 3.89   MCV 78 - 100 fL 97   MCH 26.5 - 33.0 pg 30.3   MCHC 31.5 - 36.5 g/dL 31.1 (L)   RDW 10.0 - 15.0 % 15.8 (H)   % Neutrophils % 55   % Lymphocytes % 32   % Monocytes % 8   % Eosinophils % 4   % Basophils % 1   Absolute Basophils 0.0 - 0.2 10e3/uL 0.1   Absolute Eosinophils 0.0 - 0.7 10e3/uL 0.2   Absolute Immature Granulocytes <=0.4 10e3/uL 0.0   Absolute Lymphocytes 0.8 - 5.3 10e3/uL 1.6   Absolute Monocytes 0.0 - 1.3 10e3/uL 0.4   % Immature Granulocytes % 0    Absolute Neutrophils 1.6 - 8.3 10e3/uL 2.8   Absolute NRBCs 10e3/uL 0.0   NRBCs per 100 WBC <1 /100 0   % Retic 0.5 - 2.0 % 3.1 (H)   Absolute Retic 0.025 - 0.095 10e6/uL 0.120 (H)           Ferritin Latest Ref Range: 8 - 252 ng/mL 62   Folate Latest Ref Range: >=5.4 ng/mL 25.4   Iron Latest Ref Range: 35 - 180 ug/dL 96   Iron Binding Cap Latest Ref Range: 240 - 430 ug/dL 385   Iron Saturation Index Latest Ref Range: 15 - 46 % 25   Lactate Dehydrogenase Latest Ref Range: 81 - 234 U/L 254 (H)   Vitamin B12 Latest Ref Range: 193 - 986 pg/mL 589           Ref. Range 12/27/2021 13:53   MAR  Broad Spectrum Latest Ref Range: Negative  NEG   Haptoglobin Latest Ref Range: 32 - 197 mg/dL <3 (L)           Ref. Range 11/12/2021 12:04   Copper Latest Ref Range: 80.0 - 155.0 ug/dL 72.3 (L)   Folate Latest Ref Range: >=5.4 ng/mL 35.1   Lactate Dehydrogenase Latest Ref Range: 81 - 234 U/L 277 (H)   Vitamin B12 Latest Ref Range: 193 - 986 pg/mL 655           Ref. Range 11/12/2021 12:04   INR Latest Ref Range: 0.85 - 1.15  1.30 (H)   PTT Latest Ref Range: 22 - 38 Seconds 36   Fibrinogen Latest Ref Range: 170 - 490 mg/dL 300           Ref. Range 11/12/2021 12:04   Haptoglobin Latest Ref Range: 32 - 197 mg/dL <3 (L)           Ref. Range 11/12/2021 12:04   Hep B Surface Agn Latest Ref Range: Nonreactive  Nonreactive   Hepatitis A Antibody IgG Latest Ref Range: Nonreactive  Nonreactive   Hepatitis B Core Odilia Latest Ref Range: Nonreactive  Nonreactive   Hepatitis C Antibody Latest Ref Range: Nonreactive  Nonreactive   HIV Antigen Antibody Combo Latest Ref Range: Nonreactive  Nonreactive        Ref. Range 2/26/2022 09:43 2/26/2022 09:47   Copper Latest Ref Range: 80.0 - 155.0 ug/dL 62.0 (L)     Copper Random Urine Latest Ref Range: <=3.2 ug/dL   <1.0   Creatinine Urine/Volume Latest Units: mg/dL   44   Creatinine Urine/24hr Latest Ref Range: 500 - 1400 mg/d   770         PATHOLOGY:  Peripheral Smear 12/27/21:     The red cells appear  normochromic.  Poikilocytosis is minimal. Polychromasia is not increased. Rouleaux formation is not increased. The morphology of the platelets is normal. There is no significant platelet clumping. Leukocytes are quantitatively normal and morphologically unremarkable.          Final Diagnosis 2/23/22:   Peripheral blood demonstrating thrombocytopenia with increased polychromasia             Final Diagnosis 1/12/22:   A. Liver, Biopsy:  - Steatohepatitis (NAFLD activity score = 6 of 8)  - Cirrhosis, stage 4.   Electronically signed by Zen Munoz MD PhD on 1/14/2022 at  2:25 PM   Clinical Information     History of fatty liver with biopsy 2013, now with MRI with concern for cirrhosis         Final Diagnosis 9/28/22:   Peripheral blood, morphology:  - Mild thrombocytopenia.  - Hemoglobin quantitatively within normal limits; erythrocytes macrocytic and with polychromasia.  - WBC subsets quantitatively within normal limits and without diagnostic morphologic abnormality.  - Negative for schistocytes.  - Negative for overt features of myelodysplasia or circulating blasts.         IMAGING:  US ABDOMEN LIMITED 3/25/2022:                                                       IMPRESSION: Normal appearance of the spleen. Normal size measuring 9.7 cm.     US ABDOMEN LIMITED 5/11/22:  IMPRESSION:  1.  Diffuse hepatic steatosis. No focal hepatic masses.    MRI ENTEROGRAPHY 7/27/22:  IMPRESSION:   1. Mild wall thickening and edema at the neoterminal ileum without  enhancement. This may be exaggerated by this segment being  decompressed. Findings could represent mild active inflammatory  Crohn's ileitis. No strictures, fistula or bowel obstruction or  abscess. Small bowel resection with ileoascending anastomosis and  short length of small bowel.     2. Heterogeneous hepatic parenchymal signal and enhancement, likely  related to known hepatic fibrosis. This is not fully evaluated on the  available sequences.    CT Head 10/17/22:                                                                  IMPRESSION:   HEAD CT:  1.  No acute intracranial abnormality.     HEAD CTA:   1.  Normal CTA [place] [facility] [place].     NECK CTA:  1.  Normal neck CTA.     CT PERFUSION:  1.  Normal cerebral perfusion.     2.  Findings called to Dr. Smith in the [facility] emergency room at 5:10 PM on 10/17/2022.    CT Head 11/13/22:  IMPRESSION:  1.  No acute intracranial process or significant interval change.    CT A/P 11/14/22:  IMPRESSION:   1.  Small splenorenal shunt is suggested.  2.  Increased splenomegaly compared to 1/17/2018.  3.  Nonspecific mild wall thickening at the distal rectum. This could  just relate to nondistention. Correlate with colonoscopy to assess for  any underlying colonic mass.  4.  Indeterminant hypodense nodule at the left kidney. Recommend  correlation with nonurgent renal MRI.    MRI Abdomen 2/14/23:  IMPRESSION: The exam is mildly limited by motion/respiratory  artifacts, within this limitation, there is;  1. Subcentimeter focus of mild T1 hyperintensity and no convincing  evidence of enhancement in the mid/upper pole right kidney, which  corresponds to the indeterminate focus seen on 11/14/2022 exam,  remains too small to characterize, could represent cyst with  hemorrhagic/proteinaceous component, recommend follow-up exam with  renal ultrasound in six months to confirm long-term stability and its  cystic nature.  2. Mild diffuse signal dropout on out of phase images, suggesting  hepatic steatosis.  3. Mild splenomegaly.       ASSESSMENT/PLAN:  Mercedes Arita is a 58 year old female who is referred to clinic for RBC abnormality and thrombocytopenia. Past medical history is significant for Crohn's disease on 6-MPH and PRINGLE.      Patient has had significant small bowel resection throughout her lifetime due to Crohn's disease and likely has underlying marrow suppression due to poor absorption, PRINGLE, and long-term 6-MP therapy. Her recent labs  "have returned showing copper deficiency. She has evidence of reticulocytosis on smear with undetectable haptoglobin. Patient continues on monthly B12 self-injections as prophylaxis. HIV and hepatitis studies are negative.     Patient does not have evidence of anemia and has improvement in macrocytosis. She continues to have decreased copper levels despite supplementation. Urine copper returned normal.      Liver biopsy on 1/12/22 returned with steatohepatitis NAFLD score of 6 and cirrhosis, stage 4.     Abdominal U/S 3/25/22 negative for splenomegaly.     I discussed with the patient continued thrombocytopenia and coagulopathy are most likely due to underlying cirrhosis and possible splenic sequestration. Additionally, there is likely a component of bone marrow suppression. However, she continues to have evidence of elevated T. Bili, elevated LDH, and undetectable haptoglobin, which can be due to a combination of copper deficiency and underlying cirrhosis. We continue to discuss the possibility of a bone marrow biopsy to rule out an underlying marrow disorder, but will hold at this time.      Will stop copper supplementation at this time as this has not made significant difference.     CBC is stable with normal WBC, Hb, and platelets stable at 115K.     -Patient instructed to notify clinic with any CBC abnormalities that are concerning to her and will see her sooner. Otherwise, repeat CBC in 12 months prior to follow up in clinic.    -She will continue to have close follow up with GI.        Destiny Powell DO  Hematology/Oncology  [facility]            Oncology Rooming Note    April 4, 2023 12:54 PM   Mercedes Arita is a 58 year old female who presents for:    Chief Complaint   Patient presents with     Oncology Clinic Visit     Initial Vitals: Resp 16   Ht 1.575 m (5' 2\")   Wt 74.5 kg (164 lb 3.2 oz)   BMI 30.03 kg/m   Estimated body mass index is 30.03 kg/m  as calculated from the " "following:    Height as of this encounter: 1.575 m (5' 2\").    Weight as of this encounter: 74.5 kg (164 lb 3.2 oz). Body surface area is 1.81 meters squared.  No Pain (0) Comment: Data Unavailable   No LMP recorded. Patient is postmenopausal.  Allergies reviewed: Yes  Medications reviewed: Yes    Medications: Medication refills not needed today.  Pharmacy name entered into [employer]: [id number] [place], [place] - [address]    Clinical concerns: follow up        Kirstie Merrill                Again, thank you for allowing me to participate in the care of your patient.        Sincerely,        Destiny Powell, DO    "

## 2023-04-04 NOTE — PROGRESS NOTES
"Oncology Rooming Note    April 4, 2023 12:54 PM   Mercedes Arita is a 58 year old female who presents for:    Chief Complaint   Patient presents with     Oncology Clinic Visit     Initial Vitals: Resp 16   Ht 1.575 m (5' 2\")   Wt 74.5 kg (164 lb 3.2 oz)   BMI 30.03 kg/m   Estimated body mass index is 30.03 kg/m  as calculated from the following:    Height as of this encounter: 1.575 m (5' 2\").    Weight as of this encounter: 74.5 kg (164 lb 3.2 oz). Body surface area is 1.81 meters squared.  No Pain (0) Comment: Data Unavailable   No LMP recorded. Patient is postmenopausal.  Allergies reviewed: Yes  Medications reviewed: Yes    Medications: Medication refills not needed today.  Pharmacy name entered into [facility]: [id number] - [place], [place] - [address] [facility] [address] AT [address]    Clinical concerns: follow up        Kirstie Merrill            "

## 2023-08-11 ENCOUNTER — TRANSFERRED RECORDS (OUTPATIENT)
Dept: HEALTH INFORMATION MANAGEMENT | Facility: CLINIC | Age: 58
End: 2023-08-11
Payer: COMMERCIAL

## 2023-08-11 LAB
ALT SERPL-CCNC: 33 IU/L (ref 0–32)
AST SERPL-CCNC: 54 IU/L (ref 0–40)

## 2023-08-14 ENCOUNTER — HOSPITAL ENCOUNTER (OUTPATIENT)
Dept: ULTRASOUND IMAGING | Facility: CLINIC | Age: 58
Discharge: HOME | End: 2023-08-14
Attending: INTERNAL MEDICINE
Payer: COMMERCIAL

## 2023-08-14 DIAGNOSIS — K75.81 NONALCOHOLIC STEATOHEPATITIS: ICD-10-CM

## 2023-08-14 PROCEDURE — 76705 ECHO EXAM OF ABDOMEN: CPT

## 2023-10-04 ENCOUNTER — TRANSFERRED RECORDS (OUTPATIENT)
Dept: HEALTH INFORMATION MANAGEMENT | Facility: CLINIC | Age: 58
End: 2023-10-04
Payer: COMMERCIAL

## 2023-11-04 ENCOUNTER — HEALTH MAINTENANCE LETTER (OUTPATIENT)
Age: 58
End: 2023-11-04

## 2024-01-11 ENCOUNTER — MEDICAL CORRESPONDENCE (OUTPATIENT)
Dept: SCHEDULING | Facility: CLINIC | Age: 59
End: 2024-01-11
Payer: COMMERCIAL

## 2024-02-09 ENCOUNTER — HOSPITAL ENCOUNTER (OUTPATIENT)
Dept: ULTRASOUND IMAGING | Facility: CLINIC | Age: 59
Discharge: HOME | End: 2024-02-09
Attending: INTERNAL MEDICINE | Admitting: INTERNAL MEDICINE
Payer: COMMERCIAL

## 2024-02-09 DIAGNOSIS — K74.60 UNSPECIFIED CIRRHOSIS OF LIVER (H): ICD-10-CM

## 2024-02-09 PROCEDURE — 76705 ECHO EXAM OF ABDOMEN: CPT

## 2024-02-12 ENCOUNTER — TRANSFERRED RECORDS (OUTPATIENT)
Dept: HEALTH INFORMATION MANAGEMENT | Facility: CLINIC | Age: 59
End: 2024-02-12
Payer: COMMERCIAL

## 2024-02-12 LAB
ALT SERPL-CCNC: 50 IU/L (ref 0–32)
AST SERPL-CCNC: 57 IU/L (ref 0–40)
CREATININE (EXTERNAL): 0.71 MG/DL (ref 0.57–1)
GFR ESTIMATED (EXTERNAL): 98 ML/MIN/1.73
GLUCOSE (EXTERNAL): 114 MG/DL (ref 70–99)
INR (EXTERNAL): 1.9 (ref 0.9–1.2)
POTASSIUM (EXTERNAL): 3.2 MMOL/L (ref 3.5–5.2)

## 2024-02-20 ENCOUNTER — TRANSFERRED RECORDS (OUTPATIENT)
Dept: HEALTH INFORMATION MANAGEMENT | Facility: CLINIC | Age: 59
End: 2024-02-20
Payer: COMMERCIAL

## 2024-04-15 ENCOUNTER — LAB (OUTPATIENT)
Dept: ONCOLOGY | Facility: CLINIC | Age: 59
End: 2024-04-15
Attending: PHYSICIAN ASSISTANT
Payer: COMMERCIAL

## 2024-04-15 DIAGNOSIS — D69.6 THROMBOCYTOPENIA (H): ICD-10-CM

## 2024-04-15 LAB
BASOPHILS # BLD AUTO: 0.1 10E3/UL (ref 0–0.2)
BASOPHILS NFR BLD AUTO: 1 %
EOSINOPHIL # BLD AUTO: 0.2 10E3/UL (ref 0–0.7)
EOSINOPHIL NFR BLD AUTO: 3 %
ERYTHROCYTE [DISTWIDTH] IN BLOOD BY AUTOMATED COUNT: 17 % (ref 10–15)
HCT VFR BLD AUTO: 35 % (ref 35–47)
HGB BLD-MCNC: 10.8 G/DL (ref 11.7–15.7)
IMM GRANULOCYTES # BLD: 0 10E3/UL
IMM GRANULOCYTES NFR BLD: 0 %
LYMPHOCYTES # BLD AUTO: 1.9 10E3/UL (ref 0.8–5.3)
LYMPHOCYTES NFR BLD AUTO: 31 %
MCH RBC QN AUTO: 29.8 PG (ref 26.5–33)
MCHC RBC AUTO-ENTMCNC: 30.9 G/DL (ref 31.5–36.5)
MCV RBC AUTO: 97 FL (ref 78–100)
MONOCYTES # BLD AUTO: 0.5 10E3/UL (ref 0–1.3)
MONOCYTES NFR BLD AUTO: 8 %
NEUTROPHILS # BLD AUTO: 3.5 10E3/UL (ref 1.6–8.3)
NEUTROPHILS NFR BLD AUTO: 57 %
NRBC # BLD AUTO: 0 10E3/UL
NRBC BLD AUTO-RTO: 0 /100
PLATELET # BLD AUTO: 115 10E3/UL (ref 150–450)
RBC # BLD AUTO: 3.62 10E6/UL (ref 3.8–5.2)
WBC # BLD AUTO: 6.2 10E3/UL (ref 4–11)

## 2024-04-15 PROCEDURE — 85025 COMPLETE CBC W/AUTO DIFF WBC: CPT | Performed by: INTERNAL MEDICINE

## 2024-04-15 PROCEDURE — 36415 COLL VENOUS BLD VENIPUNCTURE: CPT

## 2024-04-15 NOTE — PROGRESS NOTES
Medical Assistant Note:  Mercedes Arita presents today for blood draw.    Patient seen by provider today: No.   present during visit today: Not Applicable.    Concerns: No Concerns.    Procedure:  Lab draw site: left antecub, Needle type: butterfly, Gauge: 23.    Post Assessment:  Labs drawn without difficulty: Yes.    Discharge Plan:  Departure Mode: Ambulatory.    Face to Face Time: 10 minutes.    Jenn Crooks MA

## 2024-04-22 ENCOUNTER — ONCOLOGY VISIT (OUTPATIENT)
Dept: ONCOLOGY | Facility: CLINIC | Age: 59
End: 2024-04-22
Attending: INTERNAL MEDICINE
Payer: COMMERCIAL

## 2024-04-22 ENCOUNTER — LAB (OUTPATIENT)
Dept: LAB | Facility: CLINIC | Age: 59
End: 2024-04-22
Payer: COMMERCIAL

## 2024-04-22 VITALS
SYSTOLIC BLOOD PRESSURE: 129 MMHG | RESPIRATION RATE: 16 BRPM | BODY MASS INDEX: 27.16 KG/M2 | TEMPERATURE: 98 F | WEIGHT: 147.6 LBS | OXYGEN SATURATION: 98 % | HEART RATE: 92 BPM | DIASTOLIC BLOOD PRESSURE: 75 MMHG | HEIGHT: 62 IN

## 2024-04-22 DIAGNOSIS — D64.9 ANEMIA, UNSPECIFIED TYPE: ICD-10-CM

## 2024-04-22 DIAGNOSIS — E61.0 COPPER DEFICIENCY: ICD-10-CM

## 2024-04-22 DIAGNOSIS — D64.9 ANEMIA, UNSPECIFIED TYPE: Primary | ICD-10-CM

## 2024-04-22 DIAGNOSIS — D69.6 THROMBOCYTOPENIA (H): ICD-10-CM

## 2024-04-22 LAB
FERRITIN SERPL-MCNC: 42 NG/ML (ref 11–328)
FOLATE SERPL-MCNC: 35.9 NG/ML (ref 4.6–34.8)
IRON BINDING CAPACITY (ROCHE): 349 UG/DL (ref 240–430)
IRON SATN MFR SERPL: 25 % (ref 15–46)
IRON SERPL-MCNC: 86 UG/DL (ref 37–145)

## 2024-04-22 PROCEDURE — 36415 COLL VENOUS BLD VENIPUNCTURE: CPT

## 2024-04-22 PROCEDURE — 82728 ASSAY OF FERRITIN: CPT

## 2024-04-22 PROCEDURE — 82746 ASSAY OF FOLIC ACID SERUM: CPT

## 2024-04-22 PROCEDURE — 82607 VITAMIN B-12: CPT

## 2024-04-22 PROCEDURE — 82525 ASSAY OF COPPER: CPT

## 2024-04-22 PROCEDURE — 99214 OFFICE O/P EST MOD 30 MIN: CPT

## 2024-04-22 PROCEDURE — 84630 ASSAY OF ZINC: CPT

## 2024-04-22 PROCEDURE — 99213 OFFICE O/P EST LOW 20 MIN: CPT

## 2024-04-22 PROCEDURE — 83540 ASSAY OF IRON: CPT

## 2024-04-22 PROCEDURE — 83550 IRON BINDING TEST: CPT

## 2024-04-22 RX ORDER — LACTULOSE 10 G/15ML
30 SOLUTION ORAL 2 TIMES DAILY
COMMUNITY
Start: 2024-04-19

## 2024-04-22 ASSESSMENT — PAIN SCALES - GENERAL: PAINLEVEL: NO PAIN (0)

## 2024-04-22 NOTE — LETTER
4/22/2024         RE: Mercedes Arita  [id number] [facility] [id number]        Dear Colleague,    Thank you for referring your patient, Mercedes Arita, to the [facility]. Please see a copy of my visit note below.    [facility]    Hematology/Oncology Established Patient Follow-up Note    Treatment Summary:      Today's Date: Apr 22, 2024    Reason for Follow-up: RBC abnormality  Referring Provider: Dr. Chevy Fonseca      HISTORY OF PRESENT ILLNESS: Mercedes Arita is a 58 year old female who is referred to clinic for RBC abnormality and thrombocytopenia. Past medical history is significant for Crohn's disease on 6-MPH and PRINGLE.      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 She had her first small bowel resection in 1987. She has had a total of three small bowel resections in her lifetime with the most recent in 2012. She is followed by GI and is evaluated once yearly at this point. She has been on and off 6-MP therapy for the past 35 years. She has not required steroid pulses or hospitalization in decades. She was attempted once on remicade, but had severe non-anaphylactic reaction to this and was never trialed again on biologics. 6-MP has been held since September due to CBC.      Recently, she had an MRE performed due to persistent liver enzymes, which showed advancement in fibrosis. Her most recent colonoscopy was only a year ago without abnormality. No blood in stools, fatigue, unintentional weight loss. No personal or family history of malignancy.     In January 2021, WBC 4.4, Hb 12.6, , , AST 90, ALT 46, T. Bili 1.0, D. Bili 0.2, total protein 7.8, serum albumin 3.5.      In April 2021, WBC 4.1, Hb 12.2, , , AST 42, ALT 48, total protein 7.4, serum albumin 3.4, total bili 0.6, d. Bili 0.3.      In June 2021, WBC 4.6, Hb 12.2, , , AST 38, ALT 41, total protein 7.5, serum albumin 3.4, total bili  1.0, d. Bili 0.3.     In September 2021, WBC 4.6, Hb 11.9, , , AST 39, ALT 30, T. Bili 1.2, D. Bili 0.38.     On 10/5/21: WBC 5.4, Hb 12, , , normal diff.       INTERIM HISTORY:  Mercedes returns to clinic for routine follow up. She is doing very well. She remains off of all medications for her crohn's disease. She continues on 30 mL daily of lactulose BID and rifaximin for her liver disease. She has not had any recent hospitalzations for hepatic encephalopathy. She has no pain. No blood in the urine or stool. No post menopausal vaginal bleeding. For supplements, she is taking  B12 1,000 mcg monthly, a women's hair skin nail supplement, a daily multivitamin, vitamin D and zinc. She no longer takes oral copper. No fevers or chills. No night sweats. She has loss 24 pounds through diet and exercise. She is hoping to loose more weight. No new concerns or health updates in the past year. No pain.     She had an US of the liver that was stable and has her next US in August 2024.     REVIEW OF SYSTEMS:   A 14 point ROS was reviewed with pertinent positives and negatives in the HPI.       HOME MEDICATIONS:  Current Outpatient Medications   Medication Sig Dispense Refill    cyanocobalamin 1000 MCG/ML injection Inject 1 mL as directed every 30 days.      lactulose (CEPHULAC) 10 GM packet Take 2 packets (20 g) by mouth 2 times daily Titrate doses with goal of 3-4 bowel movements per day 120 packet 0    Multiple Vitamins-Minerals (MULTIVITAMIN GUMMIES ADULTS PO) Take 1 chew tab by mouth daily      omeprazole (PRILOSEC) 20 MG DR capsule Take 20 mg by mouth daily      rifaximin (XIFAXAN) 550 MG TABS tablet Take 550 mg by mouth 2 times daily      Vitamin D3 (VITAMIN D, CHOLECALCIFEROL,) 25 mcg (1000 units) tablet Take 1 tablet by mouth daily      zinc sulfate (ZINCATE) 220 (50 Zn) MG capsule Take 1 capsule (220 mg) by mouth daily 30 capsule 1         ALLERGIES:  Allergies   Allergen Reactions    Iodinated  Contrast Media Unknown     Sneezed once. This was ten years ago and pt doesn't recall any other symptoms.         PAST MEDICAL HISTORY:  Past Medical History:   Diagnosis Date    Chronic pain     Abdominal pain    Crohn's     Chrons    Crohn's disease (H)     diagnosed 1983    PONV (postoperative nausea and vomiting)          PAST SURGICAL HISTORY:  Past Surgical History:   Procedure Laterality Date    CHOLECYSTECTOMY      DILATE RECTUM  9/19/2012    Procedure: DILATE RECTUM;  Exam Under Anesthesia, Dilation with Stricture, lysis of adhesions, Laparoscopic hand assisted Ileocolic Resection ;  Surgeon: Magy Macias MD;  Location:  OR    GASTROSCOPY N/A 1/18/2018    Procedure: GASTROSCOPY;  GASTROSCOPY WITH balloon DILATION of duodenal stricture up to 13.5mm UNDER FLUOROSCOPY (MAC SEDATION) ;  Surgeon: Aaliyah Langley MD;  Location:  OR    GI SURGERY  1987,1996,2012    bowel resection times 3 ileo-colonic resections    HERNIA REPAIR      Ventral times 2 with mesh.    LAPAROSCOPIC RESECTION ILEOCECAL  9/19/2012    Procedure: LAPAROSCOPIC RESECTION ILEOCECAL;;  Surgeon: Magy Macias MD;  Location:  OR    LAPAROTOMY EXPLORATORY      For removal of adhesions.    MOHS MICROGRAPHIC PROCEDURE      Removal of skin cancer on face.    ORTHOPEDIC SURGERY      carpal tunnel         SOCIAL HISTORY:  Social History     Socioeconomic History    Marital status: Single     Spouse name: Not on file    Number of children: Not on file    Years of education: Not on file    Highest education level: Not on file   Occupational History    Not on file   Tobacco Use    Smoking status: Never    Smokeless tobacco: Never   Substance and Sexual Activity    Alcohol use: Yes     Comment: 2 times yearly.    Drug use: No    Sexual activity: Yes     Partners: Male   Other Topics Concern    Parent/sibling w/ CABG, MI or angioplasty before 65F 55M? Not Asked   Social History Narrative    Not on file     Social Determinants  "of Health     Financial Resource Strain: Not on file   Food Insecurity: Not on file   Transportation Needs: Not on file   Physical Activity: Not on file   Stress: Not on file   Social Connections: Not on file   Interpersonal Safety: Not At Risk (2023)    Humiliation, Afraid, Rape, and Kick questionnaire     Fear of Current or Ex-Partner: No     Emotionally Abused: No     Physically Abused: No     Sexually Abused: No   Housing Stability: Not on file         FAMILY HISTORY:  No family history on file.      PHYSICAL EXAM:  Vital signs:  /75 (BP Location: Right arm, Patient Position: Sitting, Cuff Size: Adult Regular)   Pulse 92   Temp 98  F (36.7  C) (Oral)   Resp 16   Ht 1.57 m (5' 1.81\")   Wt 67 kg (147 lb 9.6 oz)   SpO2 98%   BMI 27.16 kg/m     ECO  GENERAL/CONSTITUTIONAL: No acute distress.  LYMPH: No anterior cervical, posterior cervical or supraclavicula adenopathy  RESPIRATORY: Clear to auscultation bilaterally. No crackles or wheezing.   CARDIOVASCULAR: Regular rate and rhythm without murmurs, gallops, or rubs.  GASTROINTESTINAL: No hepatosplenomegaly, masses, or tenderness. The patient has normal bowel sounds. No guarding.  No distention.  MUSCULOSKELETAL: Warm and well-perfused, no cyanosis, clubbing, or edema.  NEUROLOGIC: Cranial nerves II-XII are intact. Alert, oriented, answers questions appropriately. No asterixis.   INTEGUMENTARY: No rashes or jaundice.  GAIT: Steady, does not use assistive device.      LABS:  Most Recent 3 CBC's:  Recent Labs   Lab Test 04/15/24  1532 23  1533 22  0547   WBC 6.2 5.1 5.2   HGB 10.8* 11.8 10.7*   MCV 97 97 99   * 115* 112*   ANEUTAUTO 3.5 2.8 2.2     Most Recent 3 BMP's:  Recent Labs   Lab Test 23  1533 22  1623 11/15/22  0611 22  0547 22  0613     --   --  141 141   POTASSIUM 4.0 3.9 3.4 3.8 3.6   CHLORIDE 104  --   --  109* 108*   CO2 25  --   --  22 18*   BUN 6.8  --   --  8.5 14.1   CR 0.62  --   " --  0.67 0.72   ANIONGAP 10  --   --  10 15   TRAVIS 9.4  --   --  8.8 9.8   *  --   --  83 95   PROTTOTAL 7.7  --   --  6.8 8.1   ALBUMIN 3.6  --   --  3.1* 3.7    Most Recent 3 LFT's:  Recent Labs   Lab Test 03/30/23  1533 11/14/22  0547 11/13/22  0613   AST 47* 43* 51*   ALT 36* 40* 48*   ALKPHOS 135* 84 106*   BILITOTAL 1.6* 2.0* 1.9*    Most Recent 2 TSH and T4:  Recent Labs   Lab Test 10/17/22  1700   TSH 0.69       Ferritin Latest Ref Range: 8 - 252 ng/mL 62   Folate Latest Ref Range: >=5.4 ng/mL 25.4   Iron Latest Ref Range: 35 - 180 ug/dL 96   Iron Binding Cap Latest Ref Range: 240 - 430 ug/dL 385   Iron Saturation Index Latest Ref Range: 15 - 46 % 25   Lactate Dehydrogenase Latest Ref Range: 81 - 234 U/L 254 (H)   Vitamin B12 Latest Ref Range: 193 - 986 pg/mL 589           Ref. Range 12/27/2021 13:53   MAR  Broad Spectrum Latest Ref Range: Negative  NEG   Haptoglobin Latest Ref Range: 32 - 197 mg/dL <3 (L)           Ref. Range 11/12/2021 12:04   Copper Latest Ref Range: 80.0 - 155.0 ug/dL 72.3 (L)   Folate Latest Ref Range: >=5.4 ng/mL 35.1   Lactate Dehydrogenase Latest Ref Range: 81 - 234 U/L 277 (H)   Vitamin B12 Latest Ref Range: 193 - 986 pg/mL 655           Ref. Range 11/12/2021 12:04   INR Latest Ref Range: 0.85 - 1.15  1.30 (H)   PTT Latest Ref Range: 22 - 38 Seconds 36   Fibrinogen Latest Ref Range: 170 - 490 mg/dL 300           Ref. Range 11/12/2021 12:04   Haptoglobin Latest Ref Range: 32 - 197 mg/dL <3 (L)           Ref. Range 11/12/2021 12:04   Hep B Surface Agn Latest Ref Range: Nonreactive  Nonreactive   Hepatitis A Antibody IgG Latest Ref Range: Nonreactive  Nonreactive   Hepatitis B Core Odilia Latest Ref Range: Nonreactive  Nonreactive   Hepatitis C Antibody Latest Ref Range: Nonreactive  Nonreactive   HIV Antigen Antibody Combo Latest Ref Range: Nonreactive  Nonreactive        Ref. Range 2/26/2022 09:43 2/26/2022 09:47   Copper Latest Ref Range: 80.0 - 155.0 ug/dL 62.0 (L)     Copper  Random Urine Latest Ref Range: <=3.2 ug/dL   <1.0   Creatinine Urine/Volume Latest Units: mg/dL   44   Creatinine Urine/24hr Latest Ref Range: 500 - 1400 mg/d   770      I reviewed the above labs today.     PATHOLOGY:  Peripheral Smear 12/27/21:     The red cells appear normochromic.  Poikilocytosis is minimal. Polychromasia is not increased. Rouleaux formation is not increased. The morphology of the platelets is normal. There is no significant platelet clumping. Leukocytes are quantitatively normal and morphologically unremarkable.          Final Diagnosis 2/23/22:   Peripheral blood demonstrating thrombocytopenia with increased polychromasia             Final Diagnosis 1/12/22:   A. Liver, Biopsy:  - Steatohepatitis (NAFLD activity score = 6 of 8)  - Cirrhosis, stage 4.   Electronically signed by Zen Munoz MD PhD on 1/14/2022 at  2:25 PM   Clinical Information     History of fatty liver with biopsy 2013, now with MRI with concern for cirrhosis         Final Diagnosis 9/28/22:   Peripheral blood, morphology:  - Mild thrombocytopenia.  - Hemoglobin quantitatively within normal limits; erythrocytes macrocytic and with polychromasia.  - WBC subsets quantitatively within normal limits and without diagnostic morphologic abnormality.  - Negative for schistocytes.  - Negative for overt features of myelodysplasia or circulating blasts.         IMAGING:  US ABDOMEN LIMITED 02/09/24:  Impression:     IMPRESSION:  1.  Mild hepatic steatosis.  2.  Mild diffuse coarsening of the hepatic parenchyma suggests some  degree of underlying fibrosis.  3.  Cholecystectomy.     US ABDOMEN LIMITED 3/25/2022:                                                       IMPRESSION: Normal appearance of the spleen. Normal size measuring 9.7 cm.     US ABDOMEN LIMITED 5/11/22:  IMPRESSION:  1.  Diffuse hepatic steatosis. No focal hepatic masses.    MRI ENTEROGRAPHY 7/27/22:  IMPRESSION:   1. Mild wall thickening and edema at the neoterminal  ileum without  enhancement. This may be exaggerated by this segment being  decompressed. Findings could represent mild active inflammatory  Crohn's ileitis. No strictures, fistula or bowel obstruction or  abscess. Small bowel resection with ileoascending anastomosis and  short length of small bowel.     2. Heterogeneous hepatic parenchymal signal and enhancement, likely  related to known hepatic fibrosis. This is not fully evaluated on the  available sequences.    CT Head 10/17/22:                                                                 IMPRESSION:   HEAD CT:  1.  No acute intracranial abnormality.     HEAD CTA:   1.  Normal CTA [place] [facility] [place].     NECK CTA:  1.  Normal neck CTA.     CT PERFUSION:  1.  Normal cerebral perfusion.     2.  Findings called to Dr. Smith in the [facility] emergency room at 5:10 PM on 10/17/2022.    CT Head 11/13/22:  IMPRESSION:  1.  No acute intracranial process or significant interval change.    CT A/P 11/14/22:  IMPRESSION:   1.  Small splenorenal shunt is suggested.  2.  Increased splenomegaly compared to 1/17/2018.  3.  Nonspecific mild wall thickening at the distal rectum. This could  just relate to nondistention. Correlate with colonoscopy to assess for  any underlying colonic mass.  4.  Indeterminant hypodense nodule at the left kidney. Recommend  correlation with nonurgent renal MRI.    MRI Abdomen 2/14/23:  IMPRESSION: The exam is mildly limited by motion/respiratory  artifacts, within this limitation, there is;  1. Subcentimeter focus of mild T1 hyperintensity and no convincing  evidence of enhancement in the mid/upper pole right kidney, which  corresponds to the indeterminate focus seen on 11/14/2022 exam,  remains too small to characterize, could represent cyst with  hemorrhagic/proteinaceous component, recommend follow-up exam with  renal ultrasound in six months to confirm long-term stability and its  cystic nature.  2. Mild diffuse signal dropout on out of phase  images, suggesting  hepatic steatosis.  3. Mild splenomegaly.       ASSESSMENT/PLAN:  Mercedes Arita is a 59 year old female who is referred to clinic for RBC abnormality and thrombocytopenia. Past medical history is significant for Crohn's disease on 6-MPH and PRINGLE.      Patient has had significant small bowel resection throughout her lifetime due to Crohn's disease and likely has underlying marrow suppression due to poor absorption, PRINGLE, and long-term 6-MP therapy. Her recent labs have returned showing copper deficiency. She has evidence of reticulocytosis on smear with undetectable haptoglobin. Patient continues on monthly B12 self-injections as prophylaxis. HIV and hepatitis studies are negative.     4/15/24 with mild anemia, labs have previously demonstrated intermittent anemia and macrocytosis. She was previously found to be copper deficient but after PO supplementation without improved levels. Urine copper was normal. Copper has now been discontinued. She has stable thrombocytopenia. Will plan to repeat iron studies, folate, B12, copper, and zinc today.      Liver biopsy on 1/12/22 returned with steatohepatitis NAFLD score of 6 and cirrhosis, stage 4.     Abdominal U/S 3/25/22 negative for splenomegaly. Recent abdominal with stable liver disease.      Previously has been felt that the thrombocytopenia and coagulopathy are most likely due to underlying cirrhosis and possible splenic sequestration. Additionally, there is likely a component of bone marrow suppression. However, has previously had evidence of elevated T. Bili, elevated LDH, and undetectable haptoglobin, which can be due to a combination of copper deficiency and underlying cirrhosis. She has not had a bone marrow biopsy at this time.    Will plan to review patient case with our hematologist team here. Patient would prefer follow up at [place] and not at the [facility] [place]ity [place].     30 minutes spent on the date of the  encounter doing chart review, review of test results, interpretation of tests, patient visit, and documentation     Analisa Sharp PA-C

## 2024-04-22 NOTE — PROGRESS NOTES
[facility]    Hematology/Oncology Established Patient Follow-up Note    Treatment Summary:      Today's Date: Apr 22, 2024    Reason for Follow-up: RBC abnormality  Referring Provider: Dr. Chevy Fonseca      HISTORY OF PRESENT ILLNESS: Mercedes Arita is a 58 year old female who is referred to clinic for RBC abnormality and thrombocytopenia. Past medical history is significant for Crohn's disease on 6-MPH and PRINGLE.      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 She had her first small bowel resection in 1987. She has had a total of three small bowel resections in her lifetime with the most recent in 2012. She is followed by GI and is evaluated once yearly at this point. She has been on and off 6-MP therapy for the past 35 years. She has not required steroid pulses or hospitalization in decades. She was attempted once on remicade, but had severe non-anaphylactic reaction to this and was never trialed again on biologics. 6-MP has been held since September due to CBC.      Recently, she had an MRE performed due to persistent liver enzymes, which showed advancement in fibrosis. Her most recent colonoscopy was only a year ago without abnormality. No blood in stools, fatigue, unintentional weight loss. No personal or family history of malignancy.     In January 2021, WBC 4.4, Hb 12.6, , , AST 90, ALT 46, T. Bili 1.0, D. Bili 0.2, total protein 7.8, serum albumin 3.5.      In April 2021, WBC 4.1, Hb 12.2, , , AST 42, ALT 48, total protein 7.4, serum albumin 3.4, total bili 0.6, d. Bili 0.3.      In June 2021, WBC 4.6, Hb 12.2, , , AST 38, ALT 41, total protein 7.5, serum albumin 3.4, total bili 1.0, d. Bili 0.3.     In September 2021, WBC 4.6, Hb 11.9, , , AST 39, ALT 30, T. Bili 1.2, D. Bili 0.38.     On 10/5/21: WBC 5.4, Hb 12, , , normal diff.       INTERIM HISTORY:  Mercedes returns to clinic for routine follow up. She  is doing very well. She remains off of all medications for her crohn's disease. She continues on 30 mL daily of lactulose BID and rifaximin for her liver disease. She has not had any recent hospitalzations for hepatic encephalopathy. She has no pain. No blood in the urine or stool. No post menopausal vaginal bleeding. For supplements, she is taking  B12 1,000 mcg monthly, a women's hair skin nail supplement, a daily multivitamin, vitamin D and zinc. She no longer takes oral copper. No fevers or chills. No night sweats. She has loss 24 pounds through diet and exercise. She is hoping to loose more weight. No new concerns or health updates in the past year. No pain.     She had an US of the liver that was stable and has her next US in August 2024.     REVIEW OF SYSTEMS:   A 14 point ROS was reviewed with pertinent positives and negatives in the HPI.       HOME MEDICATIONS:  Current Outpatient Medications   Medication Sig Dispense Refill    cyanocobalamin 1000 MCG/ML injection Inject 1 mL as directed every 30 days.      lactulose (CEPHULAC) 10 GM packet Take 2 packets (20 g) by mouth 2 times daily Titrate doses with goal of 3-4 bowel movements per day 120 packet 0    Multiple Vitamins-Minerals (MULTIVITAMIN GUMMIES ADULTS PO) Take 1 chew tab by mouth daily      omeprazole (PRILOSEC) 20 MG DR capsule Take 20 mg by mouth daily      rifaximin (XIFAXAN) 550 MG TABS tablet Take 550 mg by mouth 2 times daily      Vitamin D3 (VITAMIN D, CHOLECALCIFEROL,) 25 mcg (1000 units) tablet Take 1 tablet by mouth daily      zinc sulfate (ZINCATE) 220 (50 Zn) MG capsule Take 1 capsule (220 mg) by mouth daily 30 capsule 1         ALLERGIES:  Allergies   Allergen Reactions    Iodinated Contrast Media Unknown     Sneezed once. This was ten years ago and pt doesn't recall any other symptoms.         PAST MEDICAL HISTORY:  Past Medical History:   Diagnosis Date    Chronic pain     Abdominal pain    Crohn's     Chrons    Crohn's disease (H)      diagnosed 1983    PONV (postoperative nausea and vomiting)          PAST SURGICAL HISTORY:  Past Surgical History:   Procedure Laterality Date    CHOLECYSTECTOMY      DILATE RECTUM  9/19/2012    Procedure: DILATE RECTUM;  Exam Under Anesthesia, Dilation with Stricture, lysis of adhesions, Laparoscopic hand assisted Ileocolic Resection ;  Surgeon: Magy Macias MD;  Location: [facility] OR    GASTROSCOPY N/A 1/18/2018    Procedure: GASTROSCOPY;  GASTROSCOPY WITH balloon DILATION of duodenal stricture up to 13.5mm UNDER FLUOROSCOPY (MAC SEDATION) ;  Surgeon: Aaliyah Langley MD;  Location:  OR    GI SURGERY  1987,1996,2012    bowel resection times 3 ileo-colonic resections    HERNIA REPAIR      Ventral times 2 with mesh.    LAPAROSCOPIC RESECTION ILEOCECAL  9/19/2012    Procedure: LAPAROSCOPIC RESECTION ILEOCECAL;;  Surgeon: Magy Macias MD;  Location:  OR    LAPAROTOMY EXPLORATORY      For removal of adhesions.    MOHS MICROGRAPHIC PROCEDURE      Removal of skin cancer on face.    ORTHOPEDIC SURGERY      carpal tunnel         SOCIAL HISTORY:  Social History     Socioeconomic History    Marital status: Single     Spouse name: Not on file    Number of children: Not on file    Years of education: Not on file    Highest education level: Not on file   Occupational History    Not on file   Tobacco Use    Smoking status: Never    Smokeless tobacco: Never   Substance and Sexual Activity    Alcohol use: Yes     Comment: 2 times yearly.    Drug use: No    Sexual activity: Yes     Partners: Male   Other Topics Concern    Parent/sibling w/ CABG, MI or angioplasty before 65F 55M? Not Asked   Social History Narrative    Not on file     Social Determinants of Health     Financial Resource Strain: Not on file   Food Insecurity: Not on file   Transportation Needs: Not on file   Physical Activity: Not on file   Stress: Not on file   Social Connections: Not on file   Interpersonal Safety: Not At Risk (4/4/2023)  "   Humiliation, Afraid, Rape, and Kick questionnaire     Fear of Current or Ex-Partner: No     Emotionally Abused: No     Physically Abused: No     Sexually Abused: No   Housing Stability: Not on file         FAMILY HISTORY:  No family history on file.      PHYSICAL EXAM:  Vital signs:  /75 (BP Location: Right arm, Patient Position: Sitting, Cuff Size: Adult Regular)   Pulse 92   Temp 98  F (36.7  C) (Oral)   Resp 16   Ht 1.57 m (5' 1.81\")   Wt 67 kg (147 lb 9.6 oz)   SpO2 98%   BMI 27.16 kg/m     ECO  GENERAL/CONSTITUTIONAL: No acute distress.  LYMPH: No anterior cervical, posterior cervical or supraclavicula adenopathy  RESPIRATORY: Clear to auscultation bilaterally. No crackles or wheezing.   CARDIOVASCULAR: Regular rate and rhythm without murmurs, gallops, or rubs.  GASTROINTESTINAL: No hepatosplenomegaly, masses, or tenderness. The patient has normal bowel sounds. No guarding.  No distention.  MUSCULOSKELETAL: Warm and well-perfused, no cyanosis, clubbing, or edema.  NEUROLOGIC: Cranial nerves II-XII are intact. Alert, oriented, answers questions appropriately. No asterixis.   INTEGUMENTARY: No rashes or jaundice.  GAIT: Steady, does not use assistive device.      LABS:  Most Recent 3 CBC's:  Recent Labs   Lab Test 04/15/24  1532 23  1533 22  0547   WBC 6.2 5.1 5.2   HGB 10.8* 11.8 10.7*   MCV 97 97 99   * 115* 112*   ANEUTAUTO 3.5 2.8 2.2     Most Recent 3 BMP's:  Recent Labs   Lab Test 23  1533 22  1623 11/15/22  0611 22  0547 22  0613     --   --  141 141   POTASSIUM 4.0 3.9 3.4 3.8 3.6   CHLORIDE 104  --   --  109* 108*   CO2 25  --   --  22 18*   BUN 6.8  --   --  8.5 14.1   CR 0.62  --   --  0.67 0.72   ANIONGAP 10  --   --  10 15   TRAVIS 9.4  --   --  8.8 9.8   *  --   --  83 95   PROTTOTAL 7.7  --   --  6.8 8.1   ALBUMIN 3.6  --   --  3.1* 3.7    Most Recent 3 LFT's:  Recent Labs   Lab Test 23  1533 22  0547 " 11/13/22  0613   AST 47* 43* 51*   ALT 36* 40* 48*   ALKPHOS 135* 84 106*   BILITOTAL 1.6* 2.0* 1.9*    Most Recent 2 TSH and T4:  Recent Labs   Lab Test 10/17/22  1700   TSH 0.69       Ferritin Latest Ref Range: 8 - 252 ng/mL 62   Folate Latest Ref Range: >=5.4 ng/mL 25.4   Iron Latest Ref Range: 35 - 180 ug/dL 96   Iron Binding Cap Latest Ref Range: 240 - 430 ug/dL 385   Iron Saturation Index Latest Ref Range: 15 - 46 % 25   Lactate Dehydrogenase Latest Ref Range: 81 - 234 U/L 254 (H)   Vitamin B12 Latest Ref Range: 193 - 986 pg/mL 589           Ref. Range 12/27/2021 13:53   MAR  Broad Spectrum Latest Ref Range: Negative  NEG   Haptoglobin Latest Ref Range: 32 - 197 mg/dL <3 (L)           Ref. Range 11/12/2021 12:04   Copper Latest Ref Range: 80.0 - 155.0 ug/dL 72.3 (L)   Folate Latest Ref Range: >=5.4 ng/mL 35.1   Lactate Dehydrogenase Latest Ref Range: 81 - 234 U/L 277 (H)   Vitamin B12 Latest Ref Range: 193 - 986 pg/mL 655           Ref. Range 11/12/2021 12:04   INR Latest Ref Range: 0.85 - 1.15  1.30 (H)   PTT Latest Ref Range: 22 - 38 Seconds 36   Fibrinogen Latest Ref Range: 170 - 490 mg/dL 300           Ref. Range 11/12/2021 12:04   Haptoglobin Latest Ref Range: 32 - 197 mg/dL <3 (L)           Ref. Range 11/12/2021 12:04   Hep B Surface Agn Latest Ref Range: Nonreactive  Nonreactive   Hepatitis A Antibody IgG Latest Ref Range: Nonreactive  Nonreactive   Hepatitis B Core Odilia Latest Ref Range: Nonreactive  Nonreactive   Hepatitis C Antibody Latest Ref Range: Nonreactive  Nonreactive   HIV Antigen Antibody Combo Latest Ref Range: Nonreactive  Nonreactive        Ref. Range 2/26/2022 09:43 2/26/2022 09:47   Copper Latest Ref Range: 80.0 - 155.0 ug/dL 62.0 (L)     Copper Random Urine Latest Ref Range: <=3.2 ug/dL   <1.0   Creatinine Urine/Volume Latest Units: mg/dL   44   Creatinine Urine/24hr Latest Ref Range: 500 - 1400 mg/d   770      I reviewed the above labs today.     PATHOLOGY:  Peripheral Smear 12/27/21:      The red cells appear normochromic.  Poikilocytosis is minimal. Polychromasia is not increased. Rouleaux formation is not increased. The morphology of the platelets is normal. There is no significant platelet clumping. Leukocytes are quantitatively normal and morphologically unremarkable.          Final Diagnosis 2/23/22:   Peripheral blood demonstrating thrombocytopenia with increased polychromasia             Final Diagnosis 1/12/22:   A. Liver, Biopsy:  - Steatohepatitis (NAFLD activity score = 6 of 8)  - Cirrhosis, stage 4.   Electronically signed by Zen Munoz MD PhD on 1/14/2022 at  2:25 PM   Clinical Information     History of fatty liver with biopsy 2013, now with MRI with concern for cirrhosis         Final Diagnosis 9/28/22:   Peripheral blood, morphology:  - Mild thrombocytopenia.  - Hemoglobin quantitatively within normal limits; erythrocytes macrocytic and with polychromasia.  - WBC subsets quantitatively within normal limits and without diagnostic morphologic abnormality.  - Negative for schistocytes.  - Negative for overt features of myelodysplasia or circulating blasts.         IMAGING:  US ABDOMEN LIMITED 02/09/24:  Impression:     IMPRESSION:  1.  Mild hepatic steatosis.  2.  Mild diffuse coarsening of the hepatic parenchyma suggests some  degree of underlying fibrosis.  3.  Cholecystectomy.     US ABDOMEN LIMITED 3/25/2022:                                                       IMPRESSION: Normal appearance of the spleen. Normal size measuring 9.7 cm.     US ABDOMEN LIMITED 5/11/22:  IMPRESSION:  1.  Diffuse hepatic steatosis. No focal hepatic masses.    MRI ENTEROGRAPHY 7/27/22:  IMPRESSION:   1. Mild wall thickening and edema at the neoterminal ileum without  enhancement. This may be exaggerated by this segment being  decompressed. Findings could represent mild active inflammatory  Crohn's ileitis. No strictures, fistula or bowel obstruction or  abscess. Small bowel resection with  ileoascending anastomosis and  short length of small bowel.     2. Heterogeneous hepatic parenchymal signal and enhancement, likely  related to known hepatic fibrosis. This is not fully evaluated on the  available sequences.    CT Head 10/17/22:                                                                 IMPRESSION:   HEAD CT:  1.  No acute intracranial abnormality.     HEAD CTA:   1.  Normal CTA [place].     NECK CTA:  1.  Normal neck CTA.     CT PERFUSION:  1.  Normal cerebral perfusion.     2.  Findings called to Dr. Smith in the [facility] emergency room at 5:10 PM on 10/17/2022.    CT Head 11/13/22:  IMPRESSION:  1.  No acute intracranial process or significant interval change.    CT A/P 11/14/22:  IMPRESSION:   1.  Small splenorenal shunt is suggested.  2.  Increased splenomegaly compared to 1/17/2018.  3.  Nonspecific mild wall thickening at the distal rectum. This could  just relate to nondistention. Correlate with colonoscopy to assess for  any underlying colonic mass.  4.  Indeterminant hypodense nodule at the left kidney. Recommend  correlation with nonurgent renal MRI.    MRI Abdomen 2/14/23:  IMPRESSION: The exam is mildly limited by motion/respiratory  artifacts, within this limitation, there is;  1. Subcentimeter focus of mild T1 hyperintensity and no convincing  evidence of enhancement in the mid/upper pole right kidney, which  corresponds to the indeterminate focus seen on 11/14/2022 exam,  remains too small to characterize, could represent cyst with  hemorrhagic/proteinaceous component, recommend follow-up exam with  renal ultrasound in six months to confirm long-term stability and its  cystic nature.  2. Mild diffuse signal dropout on out of phase images, suggesting  hepatic steatosis.  3. Mild splenomegaly.       ASSESSMENT/PLAN:  Mercedes Arita is a 59 year old female who is referred to clinic for RBC abnormality and thrombocytopenia. Past medical history is significant for Crohn's  disease on 6-MPH and PRINGLE.      Patient has had significant small bowel resection throughout her lifetime due to Crohn's disease and likely has underlying marrow suppression due to poor absorption, PRINGLE, and long-term 6-MP therapy. Her recent labs have returned showing copper deficiency. She has evidence of reticulocytosis on smear with undetectable haptoglobin. Patient continues on monthly B12 self-injections as prophylaxis. HIV and hepatitis studies are negative.     4/15/24 with mild anemia, labs have previously demonstrated intermittent anemia and macrocytosis. She was previously found to be copper deficient but after PO supplementation without improved levels. Urine copper was normal. Copper has now been discontinued. She has stable thrombocytopenia. Will plan to repeat iron studies, folate, B12, copper, and zinc today.      Liver biopsy on 1/12/22 returned with steatohepatitis NAFLD score of 6 and cirrhosis, stage 4.     Abdominal U/S 3/25/22 negative for splenomegaly. Recent abdominal with stable liver disease.      Previously has been felt that the thrombocytopenia and coagulopathy are most likely due to underlying cirrhosis and possible splenic sequestration. Additionally, there is likely a component of bone marrow suppression. However, has previously had evidence of elevated T. Bili, elevated LDH, and undetectable haptoglobin, which can be due to a combination of copper deficiency and underlying cirrhosis. She has not had a bone marrow biopsy at this time.    Will plan to review patient case with our hematologist team here. Patient would prefer follow up at [place] and not [place] [facility] [place].     30 minutes spent on the date of the encounter doing chart review, review of test results, interpretation of tests, patient visit, and documentation     Analisa Sharp PA-C    Addendum   Patients copper level came back low at 34.2. Discussed with Dr. Chen who recommends proceeding  with a trial of IV copper. I spoke with the pharmacy team who recommends 1-2 mg/d x 5 days (dose reduced due to underlying cirrhosis as it is excreted via the biliary system). We will check a hepatic panel on day 5 and then recheck copper levels 1-2 weeks later. Her zinc level is also mildly decreased at 44.9. Will confirm oral dose, if remains low would consider IV zinc. Folic acid and B12 within acceptable ranges.     I personally attempted to reach patient via a phone call and left a VM to call me with questions or concerns. A [employer] message was also sent.     Analisa Sharp PA-C

## 2024-04-22 NOTE — NURSING NOTE
"Oncology Rooming Note    April 22, 2024 3:57 PM   Mercedes Arita is a 59 year old female who presents for:    Chief Complaint   Patient presents with    Oncology Clinic Visit     Thrombocytopenia (H24)     Initial Vitals: /75 (BP Location: Right arm, Patient Position: Sitting, Cuff Size: Adult Regular)   Pulse 92   Temp 98  F (36.7  C) (Oral)   Resp 16   Ht 1.57 m (5' 1.81\")   Wt 67 kg (147 lb 9.6 oz)   SpO2 98%   BMI 27.16 kg/m   Estimated body mass index is 27.16 kg/m  as calculated from the following:    Height as of this encounter: 1.57 m (5' 1.81\").    Weight as of this encounter: 67 kg (147 lb 9.6 oz). Body surface area is 1.71 meters squared.  No Pain (0) Comment: Data Unavailable   No LMP recorded. Patient is postmenopausal.  Allergies reviewed: Yes  Medications reviewed: Yes    Medications: Medication refills not needed today.  Pharmacy name entered into [employer]: [id number] - [place], [place] - [address] [address]    Frailty Screening:   Is the patient here for a new oncology consult visit in cancer care? 2. No      Clinical concerns: none       Sharon León              "

## 2024-04-22 NOTE — NURSING NOTE
Chief Complaint   Patient presents with    Labs Only    Blood Draw     Labs drawn via  by VAT.     Labs collected from venipuncture by VAT.    Allie Verde RN

## 2024-04-23 LAB
COPPER SERPL-MCNC: 34.2 UG/DL
VIT B12 SERPL-MCNC: 1786 PG/ML (ref 232–1245)
ZINC SERPL-MCNC: 44.9 UG/DL

## 2024-04-26 PROBLEM — E61.0 COPPER DEFICIENCY: Status: ACTIVE | Noted: 2024-04-26

## 2024-04-26 PROBLEM — D64.9 ANEMIA, UNSPECIFIED TYPE: Status: ACTIVE | Noted: 2024-04-26

## 2024-04-26 RX ORDER — METHYLPREDNISOLONE SODIUM SUCCINATE 125 MG/2ML
125 INJECTION, POWDER, LYOPHILIZED, FOR SOLUTION INTRAMUSCULAR; INTRAVENOUS
Status: CANCELLED
Start: 2024-04-26

## 2024-04-26 RX ORDER — MEPERIDINE HYDROCHLORIDE 25 MG/ML
25 INJECTION INTRAMUSCULAR; INTRAVENOUS; SUBCUTANEOUS EVERY 30 MIN PRN
Status: CANCELLED | OUTPATIENT
Start: 2024-04-26

## 2024-04-26 RX ORDER — ALBUTEROL SULFATE 0.83 MG/ML
2.5 SOLUTION RESPIRATORY (INHALATION)
Status: CANCELLED | OUTPATIENT
Start: 2024-04-26

## 2024-04-26 RX ORDER — DIPHENHYDRAMINE HYDROCHLORIDE 50 MG/ML
50 INJECTION INTRAMUSCULAR; INTRAVENOUS
Status: CANCELLED
Start: 2024-04-26

## 2024-04-26 RX ORDER — ALBUTEROL SULFATE 90 UG/1
1-2 AEROSOL, METERED RESPIRATORY (INHALATION)
Status: CANCELLED
Start: 2024-04-26

## 2024-04-26 RX ORDER — HEPARIN SODIUM (PORCINE) LOCK FLUSH IV SOLN 100 UNIT/ML 100 UNIT/ML
5 SOLUTION INTRAVENOUS
Status: CANCELLED | OUTPATIENT
Start: 2024-04-26

## 2024-04-26 RX ORDER — HEPARIN SODIUM,PORCINE 10 UNIT/ML
5-20 VIAL (ML) INTRAVENOUS DAILY PRN
Status: CANCELLED | OUTPATIENT
Start: 2024-04-26

## 2024-04-26 RX ORDER — EPINEPHRINE 1 MG/ML
0.3 INJECTION, SOLUTION INTRAMUSCULAR; SUBCUTANEOUS EVERY 5 MIN PRN
Status: CANCELLED | OUTPATIENT
Start: 2024-04-26

## 2024-06-10 ENCOUNTER — OFFICE VISIT (OUTPATIENT)
Dept: URGENT CARE | Facility: URGENT CARE | Age: 59
End: 2024-06-10
Payer: COMMERCIAL

## 2024-06-10 VITALS
DIASTOLIC BLOOD PRESSURE: 72 MMHG | BODY MASS INDEX: 25.4 KG/M2 | WEIGHT: 138 LBS | SYSTOLIC BLOOD PRESSURE: 125 MMHG | OXYGEN SATURATION: 98 % | HEIGHT: 62 IN | HEART RATE: 88 BPM | RESPIRATION RATE: 16 BRPM | TEMPERATURE: 98.2 F

## 2024-06-10 DIAGNOSIS — J06.9 VIRAL URI WITH COUGH: Primary | ICD-10-CM

## 2024-06-10 PROCEDURE — 99203 OFFICE O/P NEW LOW 30 MIN: CPT | Performed by: FAMILY MEDICINE

## 2024-06-10 NOTE — PATIENT INSTRUCTIONS
Afrin nasal spray (or generic):  2 sprays in both nostrils in the morning and again about 30 minutes before bedtime.  This is a fast-acting nasal decongestant.    Can also try Dayquil/Nyquil to help with symptoms.

## 2024-06-10 NOTE — PROGRESS NOTES
"  ICD-10-CM    1. Viral URI with cough  J06.9         Cannot rule out COVID--encouraged pt to do home test today for faster results than PCR test in clinic.  No fever and no body aches, so COVID and flu are both less likely.  No evidence of pneumonia on exam today.  Vitals reassuring.    Pt reports that heart murmur is not new.    PLAN:  Patient Instructions   Afrin nasal spray (or generic):  2 sprays in both nostrils in the morning and again about 30 minutes before bedtime.  This is a fast-acting nasal decongestant.    Can also try Dayquil/Nyquil to help with symptoms.    SUBJECTIVE:  Mercedes Arita is a 59 year old female who presents to  today with 5 days of cough and cold symptoms.  Today the most prominent symptom is nasal congestion and sinus pressure.  No known fevers.  No rashes.  Flew home from [place] last night.  Notes that many people on the flights out and back were sneezing and coughing.    OBJECTIVE:  /72   Pulse 88   Temp 98.2  F (36.8  C) (Oral)   Resp 16   Ht 1.57 m (5' 1.81\")   Wt 62.6 kg (138 lb)   SpO2 98%   BMI 25.40 kg/m    GEN: well-appearing, in NAD  ENT: TMs normal, oral MMM, pharynx not erythematous  Neck: no anterior or posterior LAD noted  Lungs:  CTAB  CV:  RRR, soft systolic murmur noted      "

## 2024-06-19 ENCOUNTER — INFUSION THERAPY VISIT (OUTPATIENT)
Dept: INFUSION THERAPY | Facility: CLINIC | Age: 59
End: 2024-06-19
Payer: COMMERCIAL

## 2024-06-19 VITALS
SYSTOLIC BLOOD PRESSURE: 112 MMHG | OXYGEN SATURATION: 99 % | RESPIRATION RATE: 16 BRPM | HEART RATE: 86 BPM | TEMPERATURE: 98.3 F | DIASTOLIC BLOOD PRESSURE: 69 MMHG

## 2024-06-19 DIAGNOSIS — D64.9 ANEMIA, UNSPECIFIED TYPE: Primary | ICD-10-CM

## 2024-06-19 DIAGNOSIS — E61.0 COPPER DEFICIENCY: ICD-10-CM

## 2024-06-19 PROCEDURE — 258N000003 HC RX IP 258 OP 636: Mod: JZ

## 2024-06-19 PROCEDURE — 96366 THER/PROPH/DIAG IV INF ADDON: CPT

## 2024-06-19 PROCEDURE — 250N000009 HC RX 250

## 2024-06-19 PROCEDURE — 96365 THER/PROPH/DIAG IV INF INIT: CPT

## 2024-06-19 RX ORDER — DIPHENHYDRAMINE HYDROCHLORIDE 50 MG/ML
50 INJECTION INTRAMUSCULAR; INTRAVENOUS
Status: CANCELLED
Start: 2024-08-12

## 2024-06-19 RX ORDER — ALBUTEROL SULFATE 90 UG/1
1-2 AEROSOL, METERED RESPIRATORY (INHALATION)
Status: CANCELLED
Start: 2024-08-12

## 2024-06-19 RX ORDER — MEPERIDINE HYDROCHLORIDE 25 MG/ML
25 INJECTION INTRAMUSCULAR; INTRAVENOUS; SUBCUTANEOUS EVERY 30 MIN PRN
Status: CANCELLED | OUTPATIENT
Start: 2024-08-12

## 2024-06-19 RX ORDER — HEPARIN SODIUM (PORCINE) LOCK FLUSH IV SOLN 100 UNIT/ML 100 UNIT/ML
5 SOLUTION INTRAVENOUS
Status: CANCELLED | OUTPATIENT
Start: 2024-08-12

## 2024-06-19 RX ORDER — METHYLPREDNISOLONE SODIUM SUCCINATE 125 MG/2ML
125 INJECTION, POWDER, LYOPHILIZED, FOR SOLUTION INTRAMUSCULAR; INTRAVENOUS
Status: CANCELLED
Start: 2024-08-12

## 2024-06-19 RX ORDER — EPINEPHRINE 1 MG/ML
0.3 INJECTION, SOLUTION INTRAMUSCULAR; SUBCUTANEOUS EVERY 5 MIN PRN
Status: CANCELLED | OUTPATIENT
Start: 2024-08-12

## 2024-06-19 RX ORDER — ALBUTEROL SULFATE 0.83 MG/ML
2.5 SOLUTION RESPIRATORY (INHALATION)
Status: CANCELLED | OUTPATIENT
Start: 2024-08-12

## 2024-06-19 RX ORDER — HEPARIN SODIUM,PORCINE 10 UNIT/ML
5-20 VIAL (ML) INTRAVENOUS DAILY PRN
Status: CANCELLED | OUTPATIENT
Start: 2024-08-12

## 2024-06-19 RX ADMIN — SODIUM CHLORIDE 250 ML: 9 INJECTION, SOLUTION INTRAVENOUS at 13:52

## 2024-06-19 RX ADMIN — CUPRIC CHLORIDE 1 MG: 0.4 INJECTION, SOLUTION INTRAVENOUS at 13:52

## 2024-06-19 NOTE — CONFIDENTIAL NOTE
Infusion Nursing Note:  Mercedes Arita presents today for IV copper.    Patient seen by provider today: No   present during visit today: Not Applicable.    Note: Per patient, she is doing 3 doses, not 5. Labs (hepatic panel) to be drawn on day 3.    Intravenous Access:  Peripheral IV placed with ultrasound.    Treatment Conditions:  Not Applicable.    Post Infusion Assessment:  Patient tolerated infusion without incident.  Blood return noted pre and post infusion.  Site patent and intact, free from redness, edema or discomfort.  No evidence of extravasations.  Access discontinued per protocol.       Discharge Plan:   AVS to patient via MYCHART.  Patient will return 6/20 for next appointment.   Patient discharged in stable condition accompanied by: self.  Departure Mode: Ambulatory.      Anthony Chadwick RN

## 2024-06-20 ENCOUNTER — INFUSION THERAPY VISIT (OUTPATIENT)
Dept: INFUSION THERAPY | Facility: CLINIC | Age: 59
End: 2024-06-20
Payer: COMMERCIAL

## 2024-06-20 VITALS
DIASTOLIC BLOOD PRESSURE: 69 MMHG | TEMPERATURE: 97.5 F | RESPIRATION RATE: 16 BRPM | OXYGEN SATURATION: 97 % | HEART RATE: 81 BPM | SYSTOLIC BLOOD PRESSURE: 112 MMHG

## 2024-06-20 DIAGNOSIS — D64.9 ANEMIA, UNSPECIFIED TYPE: Primary | ICD-10-CM

## 2024-06-20 DIAGNOSIS — E61.0 COPPER DEFICIENCY: ICD-10-CM

## 2024-06-20 PROCEDURE — 96365 THER/PROPH/DIAG IV INF INIT: CPT

## 2024-06-20 PROCEDURE — 258N000003 HC RX IP 258 OP 636: Mod: JZ

## 2024-06-20 PROCEDURE — 96366 THER/PROPH/DIAG IV INF ADDON: CPT

## 2024-06-20 PROCEDURE — 250N000009 HC RX 250

## 2024-06-20 RX ORDER — ALBUTEROL SULFATE 0.83 MG/ML
2.5 SOLUTION RESPIRATORY (INHALATION)
Status: CANCELLED | OUTPATIENT
Start: 2024-08-13

## 2024-06-20 RX ORDER — HEPARIN SODIUM,PORCINE 10 UNIT/ML
5-20 VIAL (ML) INTRAVENOUS DAILY PRN
Status: CANCELLED | OUTPATIENT
Start: 2024-08-13

## 2024-06-20 RX ORDER — HEPARIN SODIUM (PORCINE) LOCK FLUSH IV SOLN 100 UNIT/ML 100 UNIT/ML
5 SOLUTION INTRAVENOUS
Status: CANCELLED | OUTPATIENT
Start: 2024-08-13

## 2024-06-20 RX ORDER — ALBUTEROL SULFATE 90 UG/1
1-2 AEROSOL, METERED RESPIRATORY (INHALATION)
Status: CANCELLED
Start: 2024-08-13

## 2024-06-20 RX ORDER — EPINEPHRINE 1 MG/ML
0.3 INJECTION, SOLUTION INTRAMUSCULAR; SUBCUTANEOUS EVERY 5 MIN PRN
Status: CANCELLED | OUTPATIENT
Start: 2024-08-13

## 2024-06-20 RX ORDER — MEPERIDINE HYDROCHLORIDE 25 MG/ML
25 INJECTION INTRAMUSCULAR; INTRAVENOUS; SUBCUTANEOUS EVERY 30 MIN PRN
Status: CANCELLED | OUTPATIENT
Start: 2024-08-13

## 2024-06-20 RX ORDER — METHYLPREDNISOLONE SODIUM SUCCINATE 125 MG/2ML
125 INJECTION, POWDER, LYOPHILIZED, FOR SOLUTION INTRAMUSCULAR; INTRAVENOUS
Status: CANCELLED
Start: 2024-08-13

## 2024-06-20 RX ORDER — DIPHENHYDRAMINE HYDROCHLORIDE 50 MG/ML
50 INJECTION INTRAMUSCULAR; INTRAVENOUS
Status: CANCELLED
Start: 2024-08-13

## 2024-06-20 RX ADMIN — CUPRIC CHLORIDE 1 MG: 0.4 INJECTION, SOLUTION INTRAVENOUS at 14:13

## 2024-06-20 RX ADMIN — SODIUM CHLORIDE 250 ML: 9 INJECTION, SOLUTION INTRAVENOUS at 14:13

## 2024-06-20 NOTE — PROGRESS NOTES
Infusion Nursing Note:  Mercedes Arita presents today for IV Copper #2.    Patient seen by provider today: No   present during visit today: Not Applicable.    Note: Patient doing 3 doses only, not 5. Labs (hepatic panel) to be drawn on dose #3.    Intravenous Access:  Peripheral IV placed with US.    Treatment Conditions:  Not Applicable.      Post Infusion Assessment:  Patient tolerated infusion without incident.  Blood return noted pre and post infusion.  Site patent and intact, free from redness, edema or discomfort.  No evidence of extravasations.  Access discontinued per protocol.       Discharge Plan:   [facility]  Patient will return 6/21/24 for next appointment.   Patient discharged in stable condition accompanied by: self.  Departure Mode: Ambulatory.      Magy Rangel RN

## 2024-06-21 ENCOUNTER — INFUSION THERAPY VISIT (OUTPATIENT)
Dept: INFUSION THERAPY | Facility: CLINIC | Age: 59
End: 2024-06-21
Payer: COMMERCIAL

## 2024-06-21 ENCOUNTER — PATIENT OUTREACH (OUTPATIENT)
Dept: ONCOLOGY | Facility: CLINIC | Age: 59
End: 2024-06-21

## 2024-06-21 VITALS
SYSTOLIC BLOOD PRESSURE: 118 MMHG | DIASTOLIC BLOOD PRESSURE: 70 MMHG | TEMPERATURE: 97.8 F | HEART RATE: 81 BPM | OXYGEN SATURATION: 99 % | RESPIRATION RATE: 16 BRPM

## 2024-06-21 DIAGNOSIS — D64.9 ANEMIA, UNSPECIFIED TYPE: Primary | ICD-10-CM

## 2024-06-21 DIAGNOSIS — E61.0 COPPER DEFICIENCY: ICD-10-CM

## 2024-06-21 LAB
ALBUMIN SERPL BCG-MCNC: 2.8 G/DL (ref 3.5–5.2)
ALP SERPL-CCNC: 147 U/L (ref 40–150)
ALT SERPL W P-5'-P-CCNC: 44 U/L (ref 0–50)
AST SERPL W P-5'-P-CCNC: 50 U/L (ref 0–45)
BILIRUB DIRECT SERPL-MCNC: 0.64 MG/DL (ref 0–0.3)
BILIRUB SERPL-MCNC: 4.4 MG/DL
PROT SERPL-MCNC: 6.9 G/DL (ref 6.4–8.3)

## 2024-06-21 PROCEDURE — 80076 HEPATIC FUNCTION PANEL: CPT

## 2024-06-21 PROCEDURE — 96366 THER/PROPH/DIAG IV INF ADDON: CPT

## 2024-06-21 PROCEDURE — 96365 THER/PROPH/DIAG IV INF INIT: CPT

## 2024-06-21 PROCEDURE — 250N000009 HC RX 250

## 2024-06-21 PROCEDURE — 258N000003 HC RX IP 258 OP 636: Mod: JZ

## 2024-06-21 PROCEDURE — 36415 COLL VENOUS BLD VENIPUNCTURE: CPT

## 2024-06-21 RX ORDER — MEPERIDINE HYDROCHLORIDE 25 MG/ML
25 INJECTION INTRAMUSCULAR; INTRAVENOUS; SUBCUTANEOUS EVERY 30 MIN PRN
OUTPATIENT
Start: 2024-08-14

## 2024-06-21 RX ORDER — HEPARIN SODIUM (PORCINE) LOCK FLUSH IV SOLN 100 UNIT/ML 100 UNIT/ML
5 SOLUTION INTRAVENOUS
OUTPATIENT
Start: 2024-08-14

## 2024-06-21 RX ORDER — ALBUTEROL SULFATE 90 UG/1
1-2 AEROSOL, METERED RESPIRATORY (INHALATION)
Start: 2024-08-14

## 2024-06-21 RX ORDER — METHYLPREDNISOLONE SODIUM SUCCINATE 125 MG/2ML
125 INJECTION, POWDER, LYOPHILIZED, FOR SOLUTION INTRAMUSCULAR; INTRAVENOUS
Start: 2024-08-14

## 2024-06-21 RX ORDER — HEPARIN SODIUM,PORCINE 10 UNIT/ML
5-20 VIAL (ML) INTRAVENOUS DAILY PRN
OUTPATIENT
Start: 2024-08-14

## 2024-06-21 RX ORDER — DIPHENHYDRAMINE HYDROCHLORIDE 50 MG/ML
50 INJECTION INTRAMUSCULAR; INTRAVENOUS
Start: 2024-08-14

## 2024-06-21 RX ORDER — ALBUTEROL SULFATE 0.83 MG/ML
2.5 SOLUTION RESPIRATORY (INHALATION)
OUTPATIENT
Start: 2024-08-14

## 2024-06-21 RX ORDER — EPINEPHRINE 1 MG/ML
0.3 INJECTION, SOLUTION INTRAMUSCULAR; SUBCUTANEOUS EVERY 5 MIN PRN
OUTPATIENT
Start: 2024-08-14

## 2024-06-21 RX ADMIN — CUPRIC CHLORIDE 1 MG: 0.4 INJECTION, SOLUTION INTRAVENOUS at 14:09

## 2024-06-21 NOTE — TELEPHONE ENCOUNTER
Patient was still here in our clinic receiving her IV Copper.    Patient reports is feeling well.  Denies abdominal pain, dark urine, pale stools, fatigue, fever, decreased appetite or itching.  Writer noticed possible faint tinge of yellowing of eyes, but patient denies any jaundice.    Patient discussed that Dr. Angely Chaudhary ([facility]) is her liver MD.  Writer instructed patient to contact Dr. Chaudhary to update regarding elevated bilirubin results.  Patient discussed that Dr. Chaudhary is able to review lab results.    Writer sent staff message to Nika Harris CNP to provide update.    Ashley Jaramillo RN, BSN, OCN on 6/21/2024 at 4:22 PM

## 2024-06-21 NOTE — TELEPHONE ENCOUNTER
From: Nika Harris APRN CNP   Sent: 6/21/2024   3:45 PM CDT   To:  [facility]   Subject: urgent lab result                                Good afternoon,     I am covering the IB of a colleague today.  In April, my colleague saw a patient of Dr. Son and she had labs today showing an elevated bilirubin.     Can someone there please call the patient and let her know the result?  In my reading of her chart, she has a history of PRINGLE and has an outside GI provider that she should make aware of the labs.     Let me know if there is anything else I can do to help.      Many thanks,     Nika     Patient left clinic prior to receiving about message from Nika Harris CNP.    Writer attempted to contact patient to review lab results but went to voicemail.  Writer left message for patient to contact our office to review lab results from today.  Writer provided office phone #.    Ashley Jaramillo RN, BSN, OCN on 6/21/2024 at 4:01 PM

## 2024-06-21 NOTE — TELEPHONE ENCOUNTER
Nika Harris APRN CNP Burns, Kim A RN; P  Cancer Clinic Rn Pool  Cc: Analisa Sharp PA-C  Thank you so much, I appreciate it!    Nika          Previous Messages       ----- Message -----  From: Ashley Jaramillo RN  Sent: 6/21/2024   3:57 PM CDT  To: RASHIDA Quesada CNP; *  Subject: RE: urgent lab result                            Nika-    Patient was in our clinic today to receive IV Copper.    I left message for patient to contact our clinic to review lab results.    ROZ Soto RN, BSN, OCN on 6/21/2024 at 4:07 PM

## 2024-06-21 NOTE — PROGRESS NOTES
Infusion Nursing Note:  Mercedes Arita presents today for IV copper.    Patient seen by provider today: No   present during visit today: Not Applicable.    Note: N/A.      Intravenous Access:  Peripheral IV placed via ultrasound.    Treatment Conditions:  Not Applicable.      Post Infusion Assessment:  Patient tolerated infusion without incident.  Blood return noted pre and post infusion.  Site patent and intact, free from redness, edema or discomfort.  No evidence of extravasations.  Access discontinued per protocol.       Discharge Plan:   Discharge instructions reviewed with: Patient.  Patient and/or family verbalized understanding of discharge instructions and all questions answered.  AVS to patient via [employer].    Patient discharged in stable condition accompanied by: self.  Departure Mode: Ambulatory.      Magy Lockwood RN

## 2024-07-31 ENCOUNTER — APPOINTMENT (OUTPATIENT)
Dept: GENERAL RADIOLOGY | Facility: CLINIC | Age: 59
DRG: 178 | End: 2024-07-31
Attending: EMERGENCY MEDICINE
Payer: COMMERCIAL

## 2024-07-31 ENCOUNTER — HOSPITAL ENCOUNTER (INPATIENT)
Facility: CLINIC | Age: 59
LOS: 2 days | Discharge: HOME | DRG: 178 | End: 2024-08-02
Attending: EMERGENCY MEDICINE | Admitting: INTERNAL MEDICINE
Payer: COMMERCIAL

## 2024-07-31 DIAGNOSIS — D61.818 PANCYTOPENIA (H): ICD-10-CM

## 2024-07-31 DIAGNOSIS — E87.20 LACTIC ACIDOSIS: ICD-10-CM

## 2024-07-31 DIAGNOSIS — R82.90 ABNORMAL URINALYSIS: ICD-10-CM

## 2024-07-31 DIAGNOSIS — U07.1 COVID-19 VIRUS INFECTION: ICD-10-CM

## 2024-07-31 DIAGNOSIS — E83.42 HYPOMAGNESEMIA: ICD-10-CM

## 2024-07-31 DIAGNOSIS — K76.9 CHRONIC LIVER DISEASE: ICD-10-CM

## 2024-07-31 PROBLEM — R10.11 RIGHT UPPER QUADRANT PAIN: Status: ACTIVE | Noted: 2018-01-17

## 2024-07-31 PROBLEM — R93.3 IMAGING OF GASTROINTESTINAL TRACT ABNORMAL: Status: ACTIVE | Noted: 2024-07-31

## 2024-07-31 PROBLEM — K21.00 GASTRO-ESOPHAGEAL REFLUX DISEASE WITH ESOPHAGITIS: Status: ACTIVE | Noted: 2017-12-21

## 2024-07-31 PROBLEM — R19.7 DIARRHEA: Status: RESOLVED | Noted: 2017-12-22 | Resolved: 2024-07-31

## 2024-07-31 PROBLEM — R10.13 EPIGASTRIC PAIN: Status: ACTIVE | Noted: 2017-11-08

## 2024-07-31 PROBLEM — R93.2 ABNORMAL LIVER DIAGNOSTIC IMAGING: Status: ACTIVE | Noted: 2024-07-31

## 2024-07-31 PROBLEM — K76.0 NAFLD (NONALCOHOLIC FATTY LIVER DISEASE): Status: ACTIVE | Noted: 2024-07-31

## 2024-07-31 PROBLEM — R19.7 DIARRHEA: Status: ACTIVE | Noted: 2017-12-22

## 2024-07-31 PROBLEM — I85.00 ESOPHAGEAL VARICES WITHOUT BLEEDING (H): Status: ACTIVE | Noted: 2022-06-29

## 2024-07-31 PROBLEM — K44.9 DIAPHRAGMATIC HERNIA: Status: ACTIVE | Noted: 2022-06-29

## 2024-07-31 PROBLEM — K76.82 HEPATIC ENCEPHALOPATHY (H): Status: ACTIVE | Noted: 2022-10-17

## 2024-07-31 PROBLEM — K31.5 STRICTURE OF DUODENUM: Status: ACTIVE | Noted: 2018-01-17

## 2024-07-31 LAB
ABO/RH(D): NORMAL
ACANTHOCYTES BLD QL SMEAR: SLIGHT
ALBUMIN SERPL BCG-MCNC: 2.3 G/DL (ref 3.5–5.2)
ALBUMIN UR-MCNC: 20 MG/DL
ALBUMIN UR-MCNC: 50 MG/DL
ALP SERPL-CCNC: 120 U/L (ref 40–150)
ALT SERPL W P-5'-P-CCNC: 29 U/L (ref 0–50)
ANION GAP SERPL CALCULATED.3IONS-SCNC: 18 MMOL/L (ref 7–15)
ANTIBODY SCREEN: NEGATIVE
APPEARANCE UR: ABNORMAL
APPEARANCE UR: CLEAR
AST SERPL W P-5'-P-CCNC: 43 U/L (ref 0–45)
BACTERIA #/AREA URNS HPF: ABNORMAL /HPF
BASOPHILS # BLD AUTO: 0 10E3/UL (ref 0–0.2)
BASOPHILS NFR BLD AUTO: 1 %
BILIRUB SERPL-MCNC: 4.5 MG/DL
BILIRUB UR QL STRIP: NEGATIVE
BILIRUB UR QL STRIP: NEGATIVE
BITE CELLS BLD QL SMEAR: SLIGHT
BUN SERPL-MCNC: 5.6 MG/DL (ref 6–20)
BURR CELLS BLD QL SMEAR: SLIGHT
CA-I BLD-MCNC: 4.3 MG/DL (ref 4.4–5.2)
CALCIUM SERPL-MCNC: 7.5 MG/DL (ref 8.8–10.4)
CAOX CRY #/AREA URNS HPF: ABNORMAL /HPF
CHLORIDE SERPL-SCNC: 106 MMOL/L (ref 98–107)
COLOR UR AUTO: YELLOW
COLOR UR AUTO: YELLOW
CREAT SERPL-MCNC: 0.86 MG/DL (ref 0.51–0.95)
EGFRCR SERPLBLD CKD-EPI 2021: 78 ML/MIN/1.73M2
EOSINOPHIL # BLD AUTO: 0 10E3/UL (ref 0–0.7)
EOSINOPHIL NFR BLD AUTO: 1 %
ERYTHROCYTE [DISTWIDTH] IN BLOOD BY AUTOMATED COUNT: 18.5 % (ref 10–15)
FLUAV RNA SPEC QL NAA+PROBE: NEGATIVE
FLUBV RNA RESP QL NAA+PROBE: NEGATIVE
FRAGMENTS BLD QL SMEAR: SLIGHT
GLUCOSE BLDC GLUCOMTR-MCNC: 121 MG/DL (ref 70–99)
GLUCOSE SERPL-MCNC: 61 MG/DL (ref 70–99)
GLUCOSE UR STRIP-MCNC: NEGATIVE MG/DL
GLUCOSE UR STRIP-MCNC: NEGATIVE MG/DL
GRANULAR CAST: 6 /LPF
HCO3 BLDV-SCNC: 16 MMOL/L (ref 21–28)
HCO3 SERPL-SCNC: 14 MMOL/L (ref 22–29)
HCT VFR BLD AUTO: 26.6 % (ref 35–47)
HGB BLD-MCNC: 7.9 G/DL (ref 11.7–15.7)
HGB UR QL STRIP: ABNORMAL
HGB UR QL STRIP: ABNORMAL
HOLD SPECIMEN: NORMAL
HYALINE CASTS: 13 /LPF
HYALINE CASTS: 78 /LPF
IMM GRANULOCYTES # BLD: 0 10E3/UL
IMM GRANULOCYTES NFR BLD: 1 %
KETONES UR STRIP-MCNC: NEGATIVE MG/DL
KETONES UR STRIP-MCNC: NEGATIVE MG/DL
LACTATE BLD-SCNC: 5.7 MMOL/L
LACTATE SERPL-SCNC: 6.5 MMOL/L (ref 0.7–2)
LACTATE SERPL-SCNC: 6.6 MMOL/L (ref 0.7–2)
LEUKOCYTE ESTERASE UR QL STRIP: ABNORMAL
LEUKOCYTE ESTERASE UR QL STRIP: ABNORMAL
LYMPHOCYTES # BLD AUTO: 0.2 10E3/UL (ref 0.8–5.3)
LYMPHOCYTES NFR BLD AUTO: 9 %
MAGNESIUM SERPL-MCNC: 1.3 MG/DL (ref 1.7–2.3)
MCH RBC QN AUTO: 28.6 PG (ref 26.5–33)
MCHC RBC AUTO-ENTMCNC: 29.7 G/DL (ref 31.5–36.5)
MCV RBC AUTO: 96 FL (ref 78–100)
MONOCYTES # BLD AUTO: 0 10E3/UL (ref 0–1.3)
MONOCYTES NFR BLD AUTO: 1 %
MUCOUS THREADS #/AREA URNS LPF: PRESENT /LPF
MUCOUS THREADS #/AREA URNS LPF: PRESENT /LPF
NEUTROPHILS # BLD AUTO: 2 10E3/UL (ref 1.6–8.3)
NEUTROPHILS NFR BLD AUTO: 89 %
NITRATE UR QL: NEGATIVE
NITRATE UR QL: NEGATIVE
NRBC # BLD AUTO: 0 10E3/UL
NRBC BLD AUTO-RTO: 0 /100
PCO2 BLDV: 24 MM HG (ref 40–50)
PH BLDV: 7.44 [PH] (ref 7.32–7.43)
PH UR STRIP: 5.5 [PH] (ref 5–7)
PH UR STRIP: 5.5 [PH] (ref 5–7)
PLAT MORPH BLD: ABNORMAL
PLATELET # BLD AUTO: 84 10E3/UL (ref 150–450)
PO2 BLDV: 24 MM HG (ref 25–47)
POLYCHROMASIA BLD QL SMEAR: SLIGHT
POTASSIUM SERPL-SCNC: 3.6 MMOL/L (ref 3.4–5.3)
PROCALCITONIN SERPL IA-MCNC: 4.09 NG/ML
PROT SERPL-MCNC: 5.6 G/DL (ref 6.4–8.3)
RBC # BLD AUTO: 2.76 10E6/UL (ref 3.8–5.2)
RBC MORPH BLD: ABNORMAL
RBC URINE: 14 /HPF
RBC URINE: 28 /HPF
RSV RNA SPEC NAA+PROBE: NEGATIVE
SAO2 % BLDV: 46 % (ref 70–75)
SARS-COV-2 RNA RESP QL NAA+PROBE: POSITIVE
SODIUM SERPL-SCNC: 138 MMOL/L (ref 135–145)
SP GR UR STRIP: 1.01 (ref 1–1.03)
SP GR UR STRIP: 1.02 (ref 1–1.03)
SPECIMEN EXPIRATION DATE: NORMAL
SQUAMOUS EPITHELIAL: 2 /HPF
SQUAMOUS EPITHELIAL: 5 /HPF
UROBILINOGEN UR STRIP-MCNC: NORMAL MG/DL
UROBILINOGEN UR STRIP-MCNC: NORMAL MG/DL
WBC # BLD AUTO: 2.2 10E3/UL (ref 4–11)
WBC URINE: 11 /HPF
WBC URINE: 4 /HPF

## 2024-07-31 PROCEDURE — 87086 URINE CULTURE/COLONY COUNT: CPT | Performed by: EMERGENCY MEDICINE

## 2024-07-31 PROCEDURE — 80053 COMPREHEN METABOLIC PANEL: CPT | Performed by: EMERGENCY MEDICINE

## 2024-07-31 PROCEDURE — 36415 COLL VENOUS BLD VENIPUNCTURE: CPT | Performed by: EMERGENCY MEDICINE

## 2024-07-31 PROCEDURE — 83605 ASSAY OF LACTIC ACID: CPT | Performed by: EMERGENCY MEDICINE

## 2024-07-31 PROCEDURE — 87637 SARSCOV2&INF A&B&RSV AMP PRB: CPT | Performed by: EMERGENCY MEDICINE

## 2024-07-31 PROCEDURE — 82962 GLUCOSE BLOOD TEST: CPT

## 2024-07-31 PROCEDURE — 93005 ELECTROCARDIOGRAM TRACING: CPT

## 2024-07-31 PROCEDURE — 87040 BLOOD CULTURE FOR BACTERIA: CPT | Performed by: EMERGENCY MEDICINE

## 2024-07-31 PROCEDURE — 120N000001 HC R&B MED SURG/OB

## 2024-07-31 PROCEDURE — 71046 X-RAY EXAM CHEST 2 VIEWS: CPT

## 2024-07-31 PROCEDURE — 96365 THER/PROPH/DIAG IV INF INIT: CPT

## 2024-07-31 PROCEDURE — 96361 HYDRATE IV INFUSION ADD-ON: CPT

## 2024-07-31 PROCEDURE — 81001 URINALYSIS AUTO W/SCOPE: CPT | Performed by: EMERGENCY MEDICINE

## 2024-07-31 PROCEDURE — 83605 ASSAY OF LACTIC ACID: CPT

## 2024-07-31 PROCEDURE — 96375 TX/PRO/DX INJ NEW DRUG ADDON: CPT

## 2024-07-31 PROCEDURE — 83735 ASSAY OF MAGNESIUM: CPT | Performed by: EMERGENCY MEDICINE

## 2024-07-31 PROCEDURE — 82330 ASSAY OF CALCIUM: CPT | Performed by: EMERGENCY MEDICINE

## 2024-07-31 PROCEDURE — 99223 1ST HOSP IP/OBS HIGH 75: CPT | Performed by: STUDENT IN AN ORGANIZED HEALTH CARE EDUCATION/TRAINING PROGRAM

## 2024-07-31 PROCEDURE — 84145 PROCALCITONIN (PCT): CPT | Performed by: EMERGENCY MEDICINE

## 2024-07-31 PROCEDURE — 82803 BLOOD GASES ANY COMBINATION: CPT

## 2024-07-31 PROCEDURE — 85025 COMPLETE CBC W/AUTO DIFF WBC: CPT | Performed by: EMERGENCY MEDICINE

## 2024-07-31 PROCEDURE — 250N000011 HC RX IP 250 OP 636: Performed by: EMERGENCY MEDICINE

## 2024-07-31 PROCEDURE — 258N000001 HC RX 258: Performed by: EMERGENCY MEDICINE

## 2024-07-31 PROCEDURE — 258N000003 HC RX IP 258 OP 636: Performed by: EMERGENCY MEDICINE

## 2024-07-31 PROCEDURE — 250N000013 HC RX MED GY IP 250 OP 250 PS 637: Performed by: EMERGENCY MEDICINE

## 2024-07-31 PROCEDURE — 86900 BLOOD TYPING SEROLOGIC ABO: CPT | Performed by: EMERGENCY MEDICINE

## 2024-07-31 PROCEDURE — 99291 CRITICAL CARE FIRST HOUR: CPT | Mod: 25

## 2024-07-31 RX ORDER — CEFTRIAXONE 1 G/1
1 INJECTION, POWDER, FOR SOLUTION INTRAMUSCULAR; INTRAVENOUS EVERY 24 HOURS
Status: DISCONTINUED | OUTPATIENT
Start: 2024-08-01 | End: 2024-08-02

## 2024-07-31 RX ORDER — AMOXICILLIN 250 MG
2 CAPSULE ORAL 2 TIMES DAILY PRN
Status: DISCONTINUED | OUTPATIENT
Start: 2024-07-31 | End: 2024-08-02 | Stop reason: HOSPADM

## 2024-07-31 RX ORDER — LACTULOSE 10 G/15ML
30 SOLUTION ORAL ONCE
Status: DISCONTINUED | OUTPATIENT
Start: 2024-07-31 | End: 2024-07-31

## 2024-07-31 RX ORDER — LIDOCAINE 40 MG/G
CREAM TOPICAL
Status: DISCONTINUED | OUTPATIENT
Start: 2024-07-31 | End: 2024-08-02 | Stop reason: HOSPADM

## 2024-07-31 RX ORDER — CEFTRIAXONE 2 G/1
2 INJECTION, POWDER, FOR SOLUTION INTRAMUSCULAR; INTRAVENOUS ONCE
Status: DISCONTINUED | OUTPATIENT
Start: 2024-07-31 | End: 2024-07-31

## 2024-07-31 RX ORDER — PROCHLORPERAZINE 25 MG
25 SUPPOSITORY, RECTAL RECTAL EVERY 12 HOURS PRN
Status: DISCONTINUED | OUTPATIENT
Start: 2024-07-31 | End: 2024-08-02 | Stop reason: HOSPADM

## 2024-07-31 RX ORDER — DIPHENHYDRAMINE HYDROCHLORIDE 50 MG/ML
25 INJECTION INTRAMUSCULAR; INTRAVENOUS ONCE
Status: DISCONTINUED | OUTPATIENT
Start: 2024-07-31 | End: 2024-07-31

## 2024-07-31 RX ORDER — PROCHLORPERAZINE MALEATE 10 MG
10 TABLET ORAL EVERY 6 HOURS PRN
Status: DISCONTINUED | OUTPATIENT
Start: 2024-07-31 | End: 2024-08-02 | Stop reason: HOSPADM

## 2024-07-31 RX ORDER — CALCIUM CARBONATE 500 MG/1
1000 TABLET, CHEWABLE ORAL 4 TIMES DAILY PRN
Status: DISCONTINUED | OUTPATIENT
Start: 2024-07-31 | End: 2024-08-02 | Stop reason: HOSPADM

## 2024-07-31 RX ORDER — LACTULOSE 10 G/15ML
30 SOLUTION ORAL 2 TIMES DAILY
Status: DISCONTINUED | OUTPATIENT
Start: 2024-08-01 | End: 2024-08-02 | Stop reason: HOSPADM

## 2024-07-31 RX ORDER — PANTOPRAZOLE SODIUM 40 MG/1
40 TABLET, DELAYED RELEASE ORAL DAILY
Status: DISCONTINUED | OUTPATIENT
Start: 2024-08-01 | End: 2024-08-02 | Stop reason: HOSPADM

## 2024-07-31 RX ORDER — DEXTROSE MONOHYDRATE 25 G/50ML
25 INJECTION, SOLUTION INTRAVENOUS ONCE
Status: DISCONTINUED | OUTPATIENT
Start: 2024-07-31 | End: 2024-07-31

## 2024-07-31 RX ORDER — MAGNESIUM SULFATE HEPTAHYDRATE 40 MG/ML
2 INJECTION, SOLUTION INTRAVENOUS ONCE
Status: DISCONTINUED | OUTPATIENT
Start: 2024-07-31 | End: 2024-07-31

## 2024-07-31 RX ORDER — AMOXICILLIN 250 MG
1 CAPSULE ORAL 2 TIMES DAILY PRN
Status: DISCONTINUED | OUTPATIENT
Start: 2024-07-31 | End: 2024-08-02 | Stop reason: HOSPADM

## 2024-07-31 RX ORDER — ACETAMINOPHEN 325 MG/1
650 TABLET ORAL ONCE
Status: DISCONTINUED | OUTPATIENT
Start: 2024-07-31 | End: 2024-07-31

## 2024-07-31 RX ORDER — SODIUM CHLORIDE 9 MG/ML
INJECTION, SOLUTION INTRAVENOUS CONTINUOUS
Status: DISCONTINUED | OUTPATIENT
Start: 2024-08-01 | End: 2024-08-01

## 2024-07-31 RX ORDER — ONDANSETRON 2 MG/ML
4 INJECTION INTRAMUSCULAR; INTRAVENOUS EVERY 6 HOURS PRN
Status: DISCONTINUED | OUTPATIENT
Start: 2024-07-31 | End: 2024-08-02 | Stop reason: HOSPADM

## 2024-07-31 RX ORDER — ONDANSETRON 4 MG/1
4 TABLET, ORALLY DISINTEGRATING ORAL EVERY 6 HOURS PRN
Status: DISCONTINUED | OUTPATIENT
Start: 2024-07-31 | End: 2024-08-02 | Stop reason: HOSPADM

## 2024-07-31 RX ADMIN — DEXTROSE MONOHYDRATE 25 ML: 25 INJECTION, SOLUTION INTRAVENOUS at 20:00

## 2024-07-31 RX ADMIN — SODIUM CHLORIDE 1000 ML: 9 INJECTION, SOLUTION INTRAVENOUS at 21:45

## 2024-07-31 RX ADMIN — MAGNESIUM SULFATE HEPTAHYDRATE 2 G: 40 INJECTION, SOLUTION INTRAVENOUS at 19:59

## 2024-07-31 RX ADMIN — SODIUM CHLORIDE 500 ML: 9 INJECTION, SOLUTION INTRAVENOUS at 23:22

## 2024-07-31 RX ADMIN — LACTULOSE 30 G: 20 SOLUTION ORAL at 19:57

## 2024-07-31 RX ADMIN — ACETAMINOPHEN 650 MG: 325 TABLET, FILM COATED ORAL at 18:45

## 2024-07-31 RX ADMIN — CEFTRIAXONE 2 G: 2 INJECTION, POWDER, FOR SOLUTION INTRAMUSCULAR; INTRAVENOUS at 23:22

## 2024-07-31 RX ADMIN — SODIUM CHLORIDE 500 ML: 9 INJECTION, SOLUTION INTRAVENOUS at 20:00

## 2024-07-31 RX ADMIN — DIPHENHYDRAMINE HYDROCHLORIDE 25 MG: 50 INJECTION, SOLUTION INTRAMUSCULAR; INTRAVENOUS at 20:51

## 2024-07-31 ASSESSMENT — COLUMBIA-SUICIDE SEVERITY RATING SCALE - C-SSRS
1. IN THE PAST MONTH, HAVE YOU WISHED YOU WERE DEAD OR WISHED YOU COULD GO TO SLEEP AND NOT WAKE UP?: NO
6. HAVE YOU EVER DONE ANYTHING, STARTED TO DO ANYTHING, OR PREPARED TO DO ANYTHING TO END YOUR LIFE?: NO
2. HAVE YOU ACTUALLY HAD ANY THOUGHTS OF KILLING YOURSELF IN THE PAST MONTH?: NO

## 2024-07-31 ASSESSMENT — ACTIVITIES OF DAILY LIVING (ADL)
ADLS_ACUITY_SCORE: 35

## 2024-07-31 NOTE — ED NOTES
Bed: ED36  Expected date: 7/31/24  Expected time: 6:18 PM  Means of arrival:   Comments:  EMS - 59F N/V

## 2024-07-31 NOTE — ED TRIAGE NOTES
Pt. From home, lives with  via [facility]. Pt. C/o tremor x 1500 today, N/V, neck and back pain x today. Vitals en route 85/30s 107/30s after 500cc. IV 18G left AC placed by EMS.BG 68 non diabetic. 12 lead clear per EMS 4mg zofran with IV and IV fluids, pt. States hepatic encephalopathy.

## 2024-07-31 NOTE — ED PROVIDER NOTES
Emergency Department Note      History of Present Illness     Chief Complaint   Cough, chills, body aches    HPI   Mercedes Arita is a 59 year old female with history of crohn's disease and subsequent chronic liver disease who presents to the ED via EMS with her  for evaluation of cough, chills, and bodyaches was prominent in her neck and back that started earlier today.  She notes she had a normal morning.  The patient reports that later in the day,she developed chills, a dry cough, and bodyaches.  She states that she also had nausea and 1 episode of emesis where she threw up her lactulose at [id number]. Patient was able to tolerate her other medications. Denies headache, diarrhea, abdominal pain, urinary symptoms, or current confusion.  She denies any new skin changes.  Patient's  notes that after vomiting this afternoon prior to arrival she seemed confused like when she had high ammonia levels in the past but that has since cleared after EMS transported her here.  The patient has not had any recent injections.  She has not had any recent exposures to any sick people although she notes she was at an outdoor concert over the weekend.  She notes she was also sick with a cold after returning from [place] in early June but those symptoms resolved.    Independent Historian    as detailed above.    Review of External Notes   Office visit reviewed from 6/10/2024.  Patient seen for viral URI with cough.    Past Medical History     Medical History and Problem List   Crohn's disease  PONV  Neuropathy  Hepatic encephalopathy  Copper deficiency  Anemia   Malignant neoplasm of skin  Mitral valve disorder  Regional enteritis of small intestine with large intestine   Basal cell carcinoma of face    Medications   Lactulose  Prilosec  Xifaxan  Albuterol  Protonix  Purinethol     Surgical History   Cholecystectomy   Dilation with stricture, lysis of adhesions, laparoscopic hand assisted ileocolic  "resection  Gastroscopy with balloon dilation of duodenal stricture up to 13.5mm under fluoroscopy   Bowel resection x3  Ileo-colonic resection  Ventral hernia repair with mesh x2  Laparoscopic resection ileocecal  Laparotomy exploratory  Moh's micrographic procedure  Carpal tunnel surgery   Lysis of adhesions x2  Small intestine surgery    Physical Exam     Patient Vitals for the past 24 hrs:   BP Temp Temp src Pulse Resp SpO2 Height Weight   07/31/24 2144 -- -- -- -- -- 95 % -- --   07/31/24 2134 -- -- -- -- -- 96 % -- --   07/31/24 2124 -- -- -- -- -- 96 % -- --   07/31/24 2118 109/59 99.3  F (37.4  C) -- 103 16 96 % -- --   07/31/24 2114 109/59 -- -- -- -- -- -- --   07/31/24 2046 -- -- -- -- -- 95 % -- --   07/31/24 2031 -- -- -- -- -- 95 % -- --   07/31/24 2016 -- -- -- -- -- 97 % -- --   07/31/24 2006 -- -- -- -- -- 95 % -- --   07/31/24 2001 -- -- -- -- -- 96 % -- --   07/31/24 1951 96/46 -- -- -- -- -- -- --   07/31/24 1912 -- -- -- -- -- 98 % -- --   07/31/24 1857 -- -- -- -- -- 98 % -- --   07/31/24 1842 97/46 -- -- -- -- 98 % -- --   07/31/24 1836 97/46 (!) 103  F (39.4  C) Oral 103 15 97 % 1.575 m (5' 2\") 63 kg (139 lb)     Physical Exam  General: Adult sitting upright  Eyes: PERRL, Conjunctive within normal limits.  Subtle scleral icterus.  ENT: Moist mucous membranes, oropharynx clear.   CV: Normal S1S2, no murmur, rub or gallop. Regular rate and rhythm  Resp: Clear to auscultation bilaterally, no wheezes, rales or rhonchi. Normal respiratory effort.  GI: Abdomen is soft, nontender and nondistended. No palpable masses. No rebound or guarding.  MSK: Bilateral lower extremity edema at the ankles.  Nontender. Normal active range of motion.  Skin: Warm and dry.  Mild jaundiced appearance.  No rashes or lesions or ecchymoses on visible skin.  Neuro: Alert and oriented. Responds appropriately to all questions and commands. No focal findings appreciated. Normal muscle tone.  No tremors.  No asterixis.  Psych: " Normal mood and affect. Pleasant.     Diagnostics     Lab Results   Labs Ordered and Resulted from Time of ED Arrival to Time of ED Departure   COMPREHENSIVE METABOLIC PANEL - Abnormal       Result Value    Sodium 138      Potassium 3.6      Carbon Dioxide (CO2) 14 (*)     Anion Gap 18 (*)     Urea Nitrogen 5.6 (*)     Creatinine 0.86      GFR Estimate 78      Calcium 7.5 (*)     Chloride 106      Glucose 61 (*)     Alkaline Phosphatase 120      AST 43      ALT 29      Protein Total 5.6 (*)     Albumin 2.3 (*)     Bilirubin Total 4.5 (*)    INFLUENZA A/B, RSV, & SARS-COV2 PCR - Abnormal    Influenza A PCR Negative      Influenza B PCR Negative      RSV PCR Negative      SARS CoV2 PCR Positive (*)    CBC WITH PLATELETS AND DIFFERENTIAL - Abnormal    WBC Count 2.2 (*)     RBC Count 2.76 (*)     Hemoglobin 7.9 (*)     Hematocrit 26.6 (*)     MCV 96      MCH 28.6      MCHC 29.7 (*)     RDW 18.5 (*)     Platelet Count 84 (*)     % Neutrophils 89      % Lymphocytes 9      % Monocytes 1      % Eosinophils 1      % Basophils 1      % Immature Granulocytes 1      NRBCs per 100 WBC 0      Absolute Neutrophils 2.0      Absolute Lymphocytes 0.2 (*)     Absolute Monocytes 0.0      Absolute Eosinophils 0.0      Absolute Basophils 0.0      Absolute Immature Granulocytes 0.0      Absolute NRBCs 0.0     MAGNESIUM - Abnormal    Magnesium 1.3 (*)    ROUTINE UA WITH MICROSCOPIC REFLEX TO CULTURE - Abnormal    Color Urine Yellow      Appearance Urine Slightly Cloudy (*)     Glucose Urine Negative      Bilirubin Urine Negative      Ketones Urine Negative      Specific Gravity Urine 1.017      Blood Urine Moderate (*)     pH Urine 5.5      Protein Albumin Urine 50 (*)     Urobilinogen Urine Normal      Nitrite Urine Negative      Leukocyte Esterase Urine Moderate (*)     Bacteria Urine Few (*)     Mucus Urine Present (*)     Calcium Oxalate Crystals Urine Few (*)     RBC Urine 28 (*)     WBC Urine 11 (*)     Squamous Epithelials Urine 5  (*)     Hyaline Casts Urine 78 (*)     Granular Casts Urine 6 (*)    RBC AND PLATELET MORPHOLOGY - Abnormal    RBC Morphology Confirmed RBC Indices      Platelet Assessment        Value: Automated Count Confirmed. Platelet morphology is normal.    Acanthocytes Slight (*)     Bite Cells Slight (*)     Avon Cells Slight (*)     Polychromasia Slight (*)     RBC Fragments Slight (*)    ISTAT GASES LACTATE VENOUS POCT - Abnormal    Lactic Acid POCT 5.7 (*)     Bicarbonate Venous POCT 16 (*)     O2 Sat, Venous POCT 46 (*)     pCO2 Venous POCT 24 (*)     pH Venous POCT 7.44 (*)     pO2 Venous POCT 24 (*)    IONIZED CALCIUM - Abnormal    Calcium Ionized Whole Blood 4.3 (*)    GLUCOSE BY METER - Abnormal    GLUCOSE BY METER POCT 121 (*)    LACTIC ACID WHOLE BLOOD - Abnormal    Lactic Acid 6.6 (*)    LACTIC ACID WHOLE BLOOD - Abnormal    Lactic Acid 6.5 (*)    PROCALCITONIN - Abnormal    Procalcitonin 4.09 (*)    GLUCOSE MONITOR NURSING POCT   COVID-19 VIRUS (CORONAVIRUS) BY PCR   ROUTINE UA WITH MICROSCOPIC   TYPE AND SCREEN, ADULT   BLOOD CULTURE   URINE CULTURE   ABO/RH TYPE AND SCREEN     Imaging   Chest XR,  PA & LAT   Final Result   IMPRESSION: Heart size and pulmonary vascularity normal. The lungs are clear. Surgical clips right upper quadrant.        EKG   ECG taken at 1840, ECG read at 1858  Sinus rhythm with short HI  Nonspecific ST abnormality  Prolonged QT   Rate 106 bpm. HI interval 128 ms. QRS duration 66 ms. QT/QTc 358/475 ms. P-R-T axes 29 48 11.     Independent Interpretation   Chest x-ray reviewed-no definite infiltrate.  No effusion, cardiomegaly, pulmonary edema.    ED Course      Medications Administered   Medications   sodium chloride (PF) 0.9% PF flush 3 mL (has no administration in time range)   sodium chloride (PF) 0.9% PF flush 3 mL (3 mLs Intracatheter Not Given 7/31/24 1946)   cefTRIAXone (ROCEPHIN) 2 g vial to attach to  ml bag for ADULTS or NS 50 ml bag for PEDS (has no administration in  time range)   acetaminophen (TYLENOL) tablet 650 mg (650 mg Oral $Given 7/31/24 1845)   sodium chloride 0.9% BOLUS 500 mL (0 mLs Intravenous Stopped 7/31/24 2055)   lactulose (CHRONULAC) solution 30 g (30 g Oral $Given 7/31/24 1957)   dextrose 50 % injection 25 mL (25 mLs Intravenous $Given 7/31/24 2000)   magnesium sulfate 2 g in 50 mL sterile water intermittent infusion (0 g Intravenous Stopped 7/31/24 2054)   diphenhydrAMINE (BENADRYL) injection 25 mg (25 mg Intravenous $Given 7/31/24 2051)   sodium chloride 0.9% BOLUS 1,000 mL (1,000 mLs Intravenous $New Bag 7/31/24 2145)       Discussion of Management   Admitting Hospitalist, Adriel León    ED Course   ED Course as of 07/31/24 2137 Wed Jul 31, 2024 1848 I obtained history and examined the patient as noted above.    I reassessed the patient.  She is sitting upright, noting feeling improved.  She denies any new concerns.  I reassessed the patient and discussed findings.  I discussed indication for admission.  She continues to deny any urinary symptoms.    Additional Documentation  None    Medical Decision Making / Diagnosis     MDM   Mercedes Arita is a 59 year old female with a history of Crohn's disease and chronic liver disease who presents emergency department with concerns for multiple symptoms including cough, chills, body aches, nausea with vomiting of less than 1 day duration.  She is febrile on arrival.  She is not hypotensive.  She is tachycardic.  She tested positive for COVID which likely explains her symptoms.  Despite this, alternative etiologies for her symptoms were considered.  There is no other focus of infection on examination.  Her neck and back pain could be explained by myalgia secondary to COVID.  She has not had any recent injections, red flags for osteomyelitis, and I do not believe back imaging or neck imaging is indicated.  No indication for lumbar puncture.  Initially, antibiotics were considered unlikely to be helpful, but after  serial elevations of lactic without significant improvement and elevated procalcitonin, will cover broad-spectrum antibiotics while awaiting urine and blood cultures.  Will continue with fluid resuscitation and monitor blood pressures and other vital signs.  Lactic acidosis is multifactorial, potentially related to sepsis/septic shock, but also could be potentially secondary to illness in a patient with compromised liver function.  The presentation of more severe illness also considered.  I discussed admission with the patient which she is agreeable to.  All questions answered.    Disposition   The patient was admitted to the hospital.     Diagnosis     ICD-10-CM    1. COVID-19 virus infection  U07.1       2. Lactic acidosis  E87.20       3. Pancytopenia (H)  D61.818       4. Hypomagnesemia  E83.42       5. Chronic liver disease  K76.9       6. Abnormal urinalysis  R82.90            Scribe Disclosure:  MANAV BENSON, am serving as a scribe at 6:45 PM on 7/31/2024 to document services personally performed by Emma Durand MD based on my observations and the provider's statements to me.      Emma Durand MD  07/31/24 3368

## 2024-08-01 PROBLEM — R82.90 ABNORMAL URINALYSIS: Status: ACTIVE | Noted: 2024-08-01

## 2024-08-01 LAB
ANION GAP SERPL CALCULATED.3IONS-SCNC: 13 MMOL/L (ref 7–15)
ATRIAL RATE - MUSE: 96 BPM
ATRIAL RATE - MUSE: 99 BPM
BACTERIA UR CULT: NORMAL
BUN SERPL-MCNC: 9.7 MG/DL (ref 6–20)
CALCIUM SERPL-MCNC: 7.1 MG/DL (ref 8.8–10.4)
CHLORIDE SERPL-SCNC: 108 MMOL/L (ref 98–107)
CREAT SERPL-MCNC: 0.95 MG/DL (ref 0.51–0.95)
DIASTOLIC BLOOD PRESSURE - MUSE: NORMAL MMHG
DIASTOLIC BLOOD PRESSURE - MUSE: NORMAL MMHG
EGFRCR SERPLBLD CKD-EPI 2021: 69 ML/MIN/1.73M2
ERYTHROCYTE [DISTWIDTH] IN BLOOD BY AUTOMATED COUNT: 19.5 % (ref 10–15)
GLUCOSE SERPL-MCNC: 103 MG/DL (ref 70–99)
HCO3 SERPL-SCNC: 16 MMOL/L (ref 22–29)
HCT VFR BLD AUTO: 27.1 % (ref 35–47)
HGB BLD-MCNC: 7.9 G/DL (ref 11.7–15.7)
INTERPRETATION ECG - MUSE: NORMAL
INTERPRETATION ECG - MUSE: NORMAL
LACTATE SERPL-SCNC: 4.8 MMOL/L (ref 0.7–2)
LACTATE SERPL-SCNC: 5 MMOL/L (ref 0.7–2)
LACTATE SERPL-SCNC: 6.3 MMOL/L (ref 0.7–2)
MCH RBC QN AUTO: 28.7 PG (ref 26.5–33)
MCHC RBC AUTO-ENTMCNC: 29.2 G/DL (ref 31.5–36.5)
MCV RBC AUTO: 99 FL (ref 78–100)
P AXIS - MUSE: 32 DEGREES
P AXIS - MUSE: 42 DEGREES
PLATELET # BLD AUTO: 71 10E3/UL (ref 150–450)
POTASSIUM SERPL-SCNC: 3.5 MMOL/L (ref 3.4–5.3)
PR INTERVAL - MUSE: 112 MS
PR INTERVAL - MUSE: 96 MS
QRS DURATION - MUSE: 62 MS
QRS DURATION - MUSE: 64 MS
QT - MUSE: 386 MS
QT - MUSE: 398 MS
QTC - MUSE: 487 MS
QTC - MUSE: 510 MS
R AXIS - MUSE: 46 DEGREES
R AXIS - MUSE: 58 DEGREES
RBC # BLD AUTO: 2.75 10E6/UL (ref 3.8–5.2)
SODIUM SERPL-SCNC: 137 MMOL/L (ref 135–145)
SYSTOLIC BLOOD PRESSURE - MUSE: NORMAL MMHG
SYSTOLIC BLOOD PRESSURE - MUSE: NORMAL MMHG
T AXIS - MUSE: 29 DEGREES
T AXIS - MUSE: 4 DEGREES
VENTRICULAR RATE- MUSE: 96 BPM
VENTRICULAR RATE- MUSE: 99 BPM
WBC # BLD AUTO: 18.1 10E3/UL (ref 4–11)

## 2024-08-01 PROCEDURE — G0378 HOSPITAL OBSERVATION PER HR: HCPCS

## 2024-08-01 PROCEDURE — 83605 ASSAY OF LACTIC ACID: CPT | Performed by: INTERNAL MEDICINE

## 2024-08-01 PROCEDURE — 258N000003 HC RX IP 258 OP 636: Performed by: INTERNAL MEDICINE

## 2024-08-01 PROCEDURE — 83605 ASSAY OF LACTIC ACID: CPT | Performed by: STUDENT IN AN ORGANIZED HEALTH CARE EDUCATION/TRAINING PROGRAM

## 2024-08-01 PROCEDURE — 250N000013 HC RX MED GY IP 250 OP 250 PS 637: Performed by: STUDENT IN AN ORGANIZED HEALTH CARE EDUCATION/TRAINING PROGRAM

## 2024-08-01 PROCEDURE — 120N000001 HC R&B MED SURG/OB

## 2024-08-01 PROCEDURE — 36415 COLL VENOUS BLD VENIPUNCTURE: CPT | Performed by: INTERNAL MEDICINE

## 2024-08-01 PROCEDURE — 85027 COMPLETE CBC AUTOMATED: CPT | Performed by: STUDENT IN AN ORGANIZED HEALTH CARE EDUCATION/TRAINING PROGRAM

## 2024-08-01 PROCEDURE — 99232 SBSQ HOSP IP/OBS MODERATE 35: CPT | Performed by: INTERNAL MEDICINE

## 2024-08-01 PROCEDURE — 80048 BASIC METABOLIC PNL TOTAL CA: CPT | Performed by: STUDENT IN AN ORGANIZED HEALTH CARE EDUCATION/TRAINING PROGRAM

## 2024-08-01 PROCEDURE — 250N000011 HC RX IP 250 OP 636: Performed by: STUDENT IN AN ORGANIZED HEALTH CARE EDUCATION/TRAINING PROGRAM

## 2024-08-01 PROCEDURE — 258N000003 HC RX IP 258 OP 636: Performed by: STUDENT IN AN ORGANIZED HEALTH CARE EDUCATION/TRAINING PROGRAM

## 2024-08-01 PROCEDURE — 36415 COLL VENOUS BLD VENIPUNCTURE: CPT | Performed by: STUDENT IN AN ORGANIZED HEALTH CARE EDUCATION/TRAINING PROGRAM

## 2024-08-01 RX ORDER — SODIUM CHLORIDE, SODIUM LACTATE, POTASSIUM CHLORIDE, CALCIUM CHLORIDE 600; 310; 30; 20 MG/100ML; MG/100ML; MG/100ML; MG/100ML
INJECTION, SOLUTION INTRAVENOUS CONTINUOUS
Status: DISCONTINUED | OUTPATIENT
Start: 2024-08-01 | End: 2024-08-02

## 2024-08-01 RX ADMIN — SODIUM CHLORIDE, POTASSIUM CHLORIDE, SODIUM LACTATE AND CALCIUM CHLORIDE: 600; 310; 30; 20 INJECTION, SOLUTION INTRAVENOUS at 23:08

## 2024-08-01 RX ADMIN — SODIUM CHLORIDE, POTASSIUM CHLORIDE, SODIUM LACTATE AND CALCIUM CHLORIDE: 600; 310; 30; 20 INJECTION, SOLUTION INTRAVENOUS at 14:42

## 2024-08-01 RX ADMIN — LACTULOSE 30 G: 20 SOLUTION ORAL at 21:17

## 2024-08-01 RX ADMIN — RIFAXIMIN 550 MG: 550 TABLET ORAL at 21:17

## 2024-08-01 RX ADMIN — PANTOPRAZOLE SODIUM 40 MG: 40 TABLET, DELAYED RELEASE ORAL at 08:19

## 2024-08-01 RX ADMIN — SODIUM CHLORIDE: 9 INJECTION, SOLUTION INTRAVENOUS at 00:35

## 2024-08-01 RX ADMIN — LACTULOSE 30 G: 20 SOLUTION ORAL at 08:19

## 2024-08-01 RX ADMIN — CEFTRIAXONE 1 G: 1 INJECTION, POWDER, FOR SOLUTION INTRAMUSCULAR; INTRAVENOUS at 21:58

## 2024-08-01 RX ADMIN — RIFAXIMIN 550 MG: 550 TABLET ORAL at 08:19

## 2024-08-01 RX ADMIN — SODIUM CHLORIDE 500 ML: 9 INJECTION, SOLUTION INTRAVENOUS at 00:45

## 2024-08-01 RX ADMIN — SODIUM CHLORIDE: 9 INJECTION, SOLUTION INTRAVENOUS at 02:18

## 2024-08-01 RX ADMIN — RIFAXIMIN 550 MG: 550 TABLET ORAL at 00:50

## 2024-08-01 ASSESSMENT — ACTIVITIES OF DAILY LIVING (ADL)
ADLS_ACUITY_SCORE: 23
ADLS_ACUITY_SCORE: 22
ADLS_ACUITY_SCORE: 23
ADLS_ACUITY_SCORE: 22
ADLS_ACUITY_SCORE: 23

## 2024-08-01 NOTE — PROGRESS NOTES
Cross Cover    Called for lactic acid improving 6.6->6.3  Here with cirrhosis and sepsis 2/2 suspected UTI     Some degree of her elevated lactate is due to inadequate clearance in the setting of cirrhosis   VS with borderline BPs   Will give another  ml bolus and lemuel lactate at 3 am     Follow-up  Lactate continues to trend down and is now at 5.0

## 2024-08-01 NOTE — PLAN OF CARE
"Up independently in her room, denies any respiratory issues, high 90s on RA. 4 loose stools so far today, says that is her baseline. LR stared at 125/hr, labs work scheduled for tomorrow morning.   Problem: Adult Inpatient Plan of Care  Goal: Plan of Care Review  Description: The Plan of Care Review/Shift note should be completed every shift.  The Outcome Evaluation is a brief statement about your assessment that the patient is improving, declining, or no change.  This information will be displayed automatically on your shift  note.  Outcome: Progressing  Goal: Patient-Specific Goal (Individualized)  Description: You can add care plan individualizations to a care plan. Examples of Individualization might be:  \"Parent requests to be called daily at 9am for status\", \"I have a hard time hearing out of my right ear\", or \"Do not touch me to wake me up as it startles  me\".  Outcome: Progressing  Flowsheets (Taken 8/1/2024 1625)  Anxieties, Fears or Concerns: none  Patient/Family-Specific Goals (Include Timeframe): husnabd at the bedside no concern noted  Goal: Absence of Hospital-Acquired Illness or Injury  Outcome: Progressing  Intervention: Identify and Manage Fall Risk  Recent Flowsheet Documentation  Taken 8/1/2024 0828 by Armando Tariq RN  Safety Promotion/Fall Prevention:   clutter free environment maintained   increased rounding and observation   increase visualization of patient   nonskid shoes/slippers when out of bed   patient and family education   room organization consistent   safety round/check completed   treat reversible contributory factors  Intervention: Prevent Skin Injury  Recent Flowsheet Documentation  Taken 8/1/2024 0828 by Armando Tariq RN  Body Position: position changed independently  Goal: Optimal Comfort and Wellbeing  Outcome: Progressing  Goal: Readiness for Transition of Care  Outcome: Progressing  Flowsheets (Taken 8/1/2024 1625)  Transportation Anticipated: family or friend will " provide  Concerns to be Addressed:   no discharge needs identified   denies needs/concerns at this time  Intervention: Mutually Develop Transition Plan  Recent Flowsheet Documentation  Taken 8/1/2024 1625 by Armando Tariq RN  Transportation Anticipated: family or friend will provide  Concerns to be Addressed:   no discharge needs identified   denies needs/concerns at this time     Problem: Pain Acute  Goal: Optimal Pain Control and Function  Outcome: Progressing  Intervention: Prevent or Manage Pain  Recent Flowsheet Documentation  Taken 8/1/2024 0828 by Armando Tariq RN  Medication Review/Management: medications reviewed     Problem: Fatigue  Goal: Improved Activity Tolerance  Outcome: Progressing  Intervention: Promote Improved Energy  Recent Flowsheet Documentation  Taken 8/1/2024 0945 by Armando Tariq RN  Activity Management: ambulated to bathroom  Taken 8/1/2024 0828 by Armando Tariq RN  Fatigue Management: frequent rest breaks encouraged     Problem: Infection  Goal: Absence of Infection Signs and Symptoms  Outcome: Progressing   Goal Outcome Evaluation:                         Speaking Coherently

## 2024-08-01 NOTE — PHARMACY-ADMISSION MEDICATION HISTORY
Pharmacist Admission Medication History    Admission medication history is complete. The information provided in this note is only as accurate as the sources available at the time of the update.    Information Source(s): Patient and CareEverywhere/SureScripts via phone    Pertinent Information: None    Changes made to PTA medication list:  Added: None  Deleted: None  Changed: None    Allergies reviewed with patient and updates made in EHR: no    Medication History Completed By: Debbie Diaz [facility] 7/31/2024 10:34 PM    PTA Med List   Medication Sig Last Dose    cyanocobalamin 1000 MCG/ML injection Inject 1 mL as directed every 30 days. Past Month    lactulose (CHRONULAC) 10 GM/15ML solution Take 30 g by mouth 2 times daily 7/31/2024    Multiple Vitamins-Minerals (MULTIVITAMIN GUMMIES ADULTS PO) Take 1 chew tab by mouth daily 7/30/2024    omeprazole (PRILOSEC) 20 MG DR capsule Take 20 mg by mouth daily 7/31/2024    rifaximin (XIFAXAN) 550 MG TABS tablet Take 550 mg by mouth 2 times daily 7/31/2024    Vitamin D3 (VITAMIN D, CHOLECALCIFEROL,) 25 mcg (1000 units) tablet Take 1 tablet by mouth daily 7/31/2024    zinc sulfate (ZINCATE) 220 (50 Zn) MG capsule Take 1 capsule (220 mg) by mouth daily 7/31/2024

## 2024-08-01 NOTE — PLAN OF CARE
"VSS on RA except soft BP. A&O x4. SBA. Denies cp, sob, n/v. C/o some neck ache, refused intervention. Lactic treding down. NS running @ 75ml/hr.      Goal Outcome Evaluation:      Plan of Care Reviewed With: patient    Overall Patient Progress: improving    Outcome Evaluation: Lactic acid trending down      Problem: Adult Inpatient Plan of Care  Goal: Plan of Care Review  Description: The Plan of Care Review/Shift note should be completed every shift.  The Outcome Evaluation is a brief statement about your assessment that the patient is improving, declining, or no change.  This information will be displayed automatically on your shift  note.  Outcome: Progressing  Flowsheets (Taken 8/1/2024 0347)  Outcome Evaluation: Lactic acid trending down  Plan of Care Reviewed With: patient  Overall Patient Progress: improving  Goal: Patient-Specific Goal (Individualized)  Description: You can add care plan individualizations to a care plan. Examples of Individualization might be:  \"Parent requests to be called daily at 9am for status\", \"I have a hard time hearing out of my right ear\", or \"Do not touch me to wake me up as it startles  me\".  Outcome: Progressing  Goal: Absence of Hospital-Acquired Illness or Injury  Outcome: Progressing  Intervention: Identify and Manage Fall Risk  Recent Flowsheet Documentation  Taken 8/1/2024 0021 by Tri Garcia RN  Safety Promotion/Fall Prevention:   supervised activity   safety round/check completed   room near nurse's station   patient and family education  Intervention: Prevent Skin Injury  Recent Flowsheet Documentation  Taken 8/1/2024 0021 by Tri Garcia RN  Body Position: position changed independently  Intervention: Prevent Infection  Recent Flowsheet Documentation  Taken 8/1/2024 0021 by Tri Garcia RN  Infection Prevention:   single patient room provided   rest/sleep promoted   personal protective equipment utilized  Goal: Optimal Comfort and Wellbeing  Outcome: " Progressing  Intervention: Monitor Pain and Promote Comfort  Recent Flowsheet Documentation  Taken 8/1/2024 0215 by Tri Garcia RN  Pain Management Interventions:   declines   medication offered but refused  Taken 8/1/2024 0018 by Tri Garcia RN  Pain Management Interventions:   declines   medication offered but refused  Goal: Readiness for Transition of Care  Outcome: Progressing  Intervention: Mutually Develop Transition Plan  Recent Flowsheet Documentation  Taken 8/1/2024 0031 by Tri Garcia RN  Equipment Currently Used at Home: none     Problem: Pain Acute  Goal: Optimal Pain Control and Function  Outcome: Progressing  Intervention: Develop Pain Management Plan  Recent Flowsheet Documentation  Taken 8/1/2024 0215 by Tri Garcia RN  Pain Management Interventions:   declines   medication offered but refused  Taken 8/1/2024 0018 by Tri Garcia RN  Pain Management Interventions:   declines   medication offered but refused     Problem: Fatigue  Goal: Improved Activity Tolerance  Outcome: Progressing  Intervention: Promote Improved Energy  Recent Flowsheet Documentation  Taken 8/1/2024 0021 by Tri Garcia RN  Activity Management: activity adjusted per tolerance     Problem: Infection  Goal: Absence of Infection Signs and Symptoms  Outcome: Progressing  Intervention: Prevent or Manage Infection  Recent Flowsheet Documentation  Taken 8/1/2024 0021 by Tri Garcia RN  Isolation Precautions: special precautions initiated

## 2024-08-01 NOTE — H&P
[facility]    History and Physical - Hospitalist Service       Date of Admission:  7/31/2024    Assessment & Plan      Mercedes Arita is a 59 year old female admitted on 7/31/2024. She presents with nausea / vomiting.       Chronic nonalcoholic liver disease     Lactic acidosis    Fever     Nausea / Vomiting    Assessment: Presents with 1 day history of nausea/vomiting and generalized weakness.  On admission labs are pertinent for cytopenia with WBC of 2.2, hemoglobin of 7.9, platelet count of 84.  Her lactic acid was was very elevated at 5.7, procalcitonin elevated at 4.09.  UA with few bacteria, moderate leukocyte esterase and 11 WBCs does raise some suspicion for UTI, especially in the setting of such a high procalcitonin.  She is positive for COVID, though her chest x-ray is without significant airspace disease.    Plan:   -Admit to inpatient  -Continue PTA lactulose  -IV fluids overnight  -Antiemetics as needed  -Follow vitals/Temp  -Continue PTA rifaximin  -Repeat lactate until normalized  -IV ceftriaxone in ED, will continue  -Follow urine cultures and blood cultures      Crohn's disease of ileum (H)    Assessment/Plan: No evidence of acute Crohn's flare at this time.  Continue to follow as an outpatient.      Anemia, unspecified type    Thrombocytopenia    Pancytopenia    Assessment/Plan: Pancytopenia secondary to cirrhosis/chronic liver disease.  No evidence of acute bleeding at this time.      Gastro-esophageal reflux disease with esophagitis    Assessment/Plan: continue PPI    # Confirmed COVID-19 infection    # Viral Pneumonia secondary to COVID-19 infection     Symptom Onset 07/31/2024   Date of 1st Positive Test Use date of first positive test.   Vaccination Status Fully Vaccinated       - COVID-19 special precautions, continuous pulse-ox  - Oxygen: continue current support with O2 Device: None (Room air) at  ; titrate to keep SpO2 between 90-96%  - Labs: Daily COVID labs ordered  "x4 days (CBC, BMP, D-dimer, CRP).  Continue to trend after 4 days as needed.   - Imaging: chest x-ray ordered -> Heart size and pulmonary vascularity normal. The lungs are clear.   - Breathing treatments: no inhalers needed; avoid nebulizers in favor of MDIs   - IV fluids: continuous at a rate of 100 mL/hr; hydrate cautiously to avoid worsening respiratory status with volume overload.  - Antibiotics: not indicated   - COVID-Focused Medications: No COVID-specific therapies are appropriate at this time.  - DVT Prophylaxis:         - At high risk of thrombotic complications due to COVID-19 (DDimer = N/A )         - Pharmacologic DVT prophylaxis contraindicated due to cirrhosis        Diet:  Regular Diet  DVT Prophylaxis: Pneumatic Compression Devices  Rueda Catheter: Not present  Lines: None     Cardiac Monitoring: None  Code Status:  FULL CODE    Clinically Significant Risk Factors Present on Admission          # Hypocalcemia: Lowest iCa = 4.3 mg/dL in last 2 days, will monitor and replace as appropriate   # Hypomagnesemia: Lowest Mg = 1.3 mg/dL in last 2 days, will replace as needed   # Hypoalbuminemia: Lowest albumin = 2.3 g/dL at 7/31/2024  6:52 PM, will monitor as appropriate   # Thrombocytopenia: Lowest platelets = 84 in last 2 days, will monitor for bleeding        # Anemia: based on hgb <11       # Overweight: Estimated body mass index is 25.42 kg/m  as calculated from the following:    Height as of this encounter: 1.575 m (5' 2\").    Weight as of this encounter: 63 kg (139 lb).              Disposition Plan     Medically Ready for Discharge: Anticipated in 2-4 Days           Adriel León MD  Hospitalist Service  [facility]  Securely message with Mimeo (more info)  Text page via [facility] Paging/Directory     ______________________________________________________________________    Chief Complaint     Nausea / vomiting  Confusion    History is obtained from the patient    History of Present " Illness     Mercedes Arita is a 59 year old female with past medical history of cirrhosis, Crohn's disease, who presents for evaluation of nausea/vomiting.    Patient reports worsening tremor this afternoon along with onset of nausea and vomiting.  She also has been dealing with chills, and nonproductive cough in addition to neck pain and low back pain.  She was unable to tolerate her lactulose due to her GI symptoms.  She otherwise denies any abdominal pain, She denies any urinary complaints of urgency or hematuria but does endorse some urinary frequency, denies any significant confusion.  She reports that she recently got back from [employer] on June 9, and has been dealing with severe cold symptoms and got sick again after a concert on July 8.  She does have a history of hepatic encephalopathy and Crohn's disease but denies any bleeding issues from her Crohn's at this time.  Denies any recent spinal injections.  She denies any slurred speech, localized weakness or numbness of her extremities.  She denies any fevers at home.  She denies any recent falls or head trauma.  At this time she has no other complaints.      Past Medical History    Past Medical History:   Diagnosis Date    Chronic pain     Abdominal pain    Crohn's     Chrons    Crohn's disease (H)     diagnosed 1983    PONV (postoperative nausea and vomiting)        Past Surgical History   Past Surgical History:   Procedure Laterality Date    CHOLECYSTECTOMY      DILATE RECTUM  9/19/2012    Procedure: DILATE RECTUM;  Exam Under Anesthesia, Dilation with Stricture, lysis of adhesions, Laparoscopic hand assisted Ileocolic Resection ;  Surgeon: Magy Macias MD;  Location:  OR    GASTROSCOPY N/A 1/18/2018    Procedure: GASTROSCOPY;  GASTROSCOPY WITH balloon DILATION of duodenal stricture up to 13.5mm UNDER FLUOROSCOPY (MAC SEDATION) ;  Surgeon: Aaliyah Langley MD;  Location:  OR    GI SURGERY  1987,1996,2012    bowel resection times 3  ileo-colonic resections    HERNIA REPAIR      Ventral times 2 with mesh.    LAPAROSCOPIC RESECTION ILEOCECAL  9/19/2012    Procedure: LAPAROSCOPIC RESECTION ILEOCECAL;;  Surgeon: Magy Macias MD;  Location: [facility] OR    LAPAROTOMY EXPLORATORY      For removal of adhesions.    MOHS MICROGRAPHIC PROCEDURE      Removal of skin cancer on face.    ORTHOPEDIC SURGERY      carpal tunnel       Prior to Admission Medications   Prior to Admission Medications   Prescriptions Last Dose Informant Patient Reported? Taking?   Multiple Vitamins-Minerals (MULTIVITAMIN GUMMIES ADULTS PO) 7/30/2024  Yes Yes   Sig: Take 1 chew tab by mouth daily   Vitamin D3 (VITAMIN D, CHOLECALCIFEROL,) 25 mcg (1000 units) tablet 7/31/2024  Yes Yes   Sig: Take 1 tablet by mouth daily   cyanocobalamin 1000 MCG/ML injection Past Month Self Yes Yes   Sig: Inject 1 mL as directed every 30 days.   lactulose (CHRONULAC) 10 GM/15ML solution 7/31/2024  Yes Yes   Sig: Take 30 g by mouth 2 times daily   omeprazole (PRILOSEC) 20 MG DR capsule 7/31/2024  Yes Yes   Sig: Take 20 mg by mouth daily   rifaximin (XIFAXAN) 550 MG TABS tablet 7/31/2024  Yes Yes   Sig: Take 550 mg by mouth 2 times daily   zinc sulfate (ZINCATE) 220 (50 Zn) MG capsule 7/31/2024  No Yes   Sig: Take 1 capsule (220 mg) by mouth daily      Facility-Administered Medications: None        Review of Systems    The 10 point Review of Systems is negative other than noted in the HPI or here.     Social History   I have reviewed this patient's social history and updated it with pertinent information if needed.  Social History     Tobacco Use    Smoking status: Never    Smokeless tobacco: Never   Substance Use Topics    Alcohol use: Yes     Comment: 2 times yearly.    Drug use: No         Family History   I have reviewed this patient's family history and updated it with pertinent information if needed.  Family History   Problem Relation Age of Onset    No Known Problems Mother     No Known Problems  Father        Allergies   Allergies   Allergen Reactions    Iodinated Contrast Media Unknown     Sneezed once. This was ten years ago and pt doesn't recall any other symptoms.     ------------------------------------------------------------------------     Physical Exam   Vital Signs: Temp: 99.3  F (37.4  C) Temp src: Oral BP: 109/59 Pulse: 103   Resp: 16 SpO2: 95 % O2 Device: None (Room air)    Weight: 139 lbs 0 oz    Constitutional: awake, alert, cooperative, no apparent distress.   Eyes: Lids and lashes normal  Respiratory: CTABL   Cardiovascular: RRR with no m/r/g   GI: Normal bowel sounds, soft, non-distended, non-tender.   Skin: normal skin color, texture, turgor   Musculoskeletal: There is no redness, warmth, or swelling of the joints. Full range of motion noted.   Neurologic: Awake, alert, oriented to name, place and time. Fabiana. Motor is 5 out of 5 bilaterally. Sensory is intact.   Neuropsychiatric: normal mood and affect    ----------------------------------------------------------------------------------------------------------------------------------    Medical Decision Making       ------------------ MEDICAL DECISION MAKING ------------------------------------------------------------------------------------------------------  High Complexity Decision Making       Data   ------------------------- PAST 24 HR DATA REVIEWED -----------------------------------------------    I have personally reviewed the following data over the past 24 hrs:    2.2 (L)  \   7.9 (L)   / 84 (L)     138 106 5.6 (L) /  121 (H)   3.6 14 (L) 0.86 \     ALT: 29 AST: 43 AP: 120 TBILI: 4.5 (H)   ALB: 2.3 (L) TOT PROTEIN: 5.6 (L) LIPASE: N/A     Procal: 4.09 (H) CRP: N/A Lactic Acid: 6.5 (HH)         Imaging results reviewed over the past 24 hrs:   Recent Results (from the past 24 hour(s))   Chest XR,  PA & LAT    Narrative    EXAM: XR CHEST 2 VIEWS  LOCATION: [facility]  DATE: 7/31/2024    INDICATION: cough,  fever  COMPARISON: None.      Impression    IMPRESSION: Heart size and pulmonary vascularity normal. The lungs are clear. Surgical clips right upper quadrant.     ------------------------- ENCOUNTER LABS ----------------------------------------------------------------  Recent Labs   Lab 07/31/24 2043 07/31/24 1852   WBC  --  2.2*   HGB  --  7.9*   MCV  --  96   PLT  --  84*   NA  --  138   POTASSIUM  --  3.6   CHLORIDE  --  106   CO2  --  14*   BUN  --  5.6*   CR  --  0.86   ANIONGAP  --  18*   TRAVIS  --  7.5*   * 61*   ALBUMIN  --  2.3*   PROTTOTAL  --  5.6*   BILITOTAL  --  4.5*   ALKPHOS  --  120   ALT  --  29   AST  --  43       Most Recent 3 CBC's:  Recent Labs   Lab Test 07/31/24  1852 04/15/24  1532 03/30/23  1533   WBC 2.2* 6.2 5.1   HGB 7.9* 10.8* 11.8   MCV 96 97 97   PLT 84* 115* 115*     Most Recent 3 BMP's:  Recent Labs   Lab Test 07/31/24 2043 07/31/24 1852 03/30/23  1533 11/16/22  1623 11/15/22  0611 11/14/22  0547   NA  --  138 139  --   --  141   POTASSIUM  --  3.6 4.0 3.9   < > 3.8   CHLORIDE  --  106 104  --   --  109*   CO2  --  14* 25  --   --  22   BUN  --  5.6* 6.8  --   --  8.5   CR  --  0.86 0.62  --   --  0.67   ANIONGAP  --  18* 10  --   --  10   TRAVIS  --  7.5* 9.4  --   --  8.8   * 61* 124*  --   --  83    < > = values in this interval not displayed.     Most Recent 2 LFT's:  Recent Labs   Lab Test 07/31/24  1852 06/21/24  1406   AST 43 50*   ALT 29 44   ALKPHOS 120 147   BILITOTAL 4.5* 4.4*     Most Recent 3 INR's:  Recent Labs   Lab Test 02/12/24  1501 11/13/22  0615 03/25/22  0806   INR 1.9* 1.53* 1.36*     Most Recent 3 Troponin's:No lab results found.  Most Recent 3 BNP's:No lab results found.  Most Recent D-dimer:No lab results found.  Most Recent Cholesterol Panel:No lab results found.  Most Recent 6 Bacteria Isolates From Any Culture (See EPIC Reports for Culture Details):No lab results found.  Most Recent TSH and T4:  Recent Labs   Lab Test 10/17/22  1700   TSH  0.69     Most Recent Urinalysis:  Recent Labs   Lab Test 07/31/24 2116   COLOR Yellow   APPEARANCE Slightly Cloudy*   URINEGLC Negative   URINEBILI Negative   URINEKETONE Negative   SG 1.017   UBLD Moderate*   URINEPH 5.5   PROTEIN 50*   NITRITE Negative   LEUKEST Moderate*   RBCU 28*   WBCU 11*     Most Recent ESR & CRP:No lab results found.

## 2024-08-01 NOTE — PROGRESS NOTES
"    [facility]     Hospitalist Progress Note     Assessment & Plan     ASSESSMENT    59F with history of Crohn's disease s/p bowel resection and PRINGLE cirrhosis c/b HE and pancytopenia presents with intractable nausea and vomiting w/ lactic acidosis and found to have COVID-19 and acute cystitis.  Symptomatically improved although urine culture still pending.    PLAN    Intractable Nausea & Vomiting w/ Hypovolemia & Lactic Acidosis  -Presented with intractable nausea and vomiting in setting of COVID-19 and UTI  -Evidence of lactic acidosis but suspect due to hypovolemia and decreased clearance from liver disease  -Now symptomatically improved  PLAN  -Antiemetics as needed  -Treatment of acute cystitis and COVID-19 per below  -IVF and trend lactate    Acute Cystitis  -Ceftriaxone 1 g daily  -Follow-up urine culture    COVID-19 Infection  -On room air and no upper respiratory symptoms at this time  -Will hold off on treatment given no symptoms    Other Issues  -PRINGLE Cirrhosis: Not diuretic dependent outpatient.  Home lactulose and rifaximin  -Pancytopenia: Due to underlying cirrhosis at recent baseline  -Hypoglycemia: Resolved now that patient is eating  -Chronic Crohn's disease: No current symptoms not on suppressive therapy outpatient    DVT Prophy  -SCDs    Disposition  Medically Ready for Discharge: Anticipated Tomorrow       Angus Abad MD    Subjective     Seen at bedside.  Feels much improved.  Denies nausea unable to tolerate p.o.  Lactate still elevated.        Objective   Blood pressure 95/47, pulse 85, temperature 98.8  F (37.1  C), temperature source Oral, resp. rate 16, height 1.575 m (5' 2\"), weight 66.6 kg (146 lb 12.8 oz), SpO2 96%, not currently breastfeeding.    PHYSICAL EXAM  General: In no acute distress  CV: RRR.  Lungs: CTAB. Nl WOB.  Abd: Non-tender.  Ext: No edema.    LABS AND IMAGING  Reviewed and pertinent results discussed in assessment and plan.   "

## 2024-08-01 NOTE — ED NOTES
[facility]  ED Nurse Handoff Report    ED Chief complaint: Vomiting and Tremors  . ED Diagnosis:   Final diagnoses:   COVID-19 virus infection   Lactic acidosis   Pancytopenia (H)   Hypomagnesemia   Chronic liver disease   Abnormal urinalysis       Allergies:   Allergies   Allergen Reactions    Iodinated Contrast Media Unknown     Sneezed once. This was ten years ago and pt doesn't recall any other symptoms.       Code Status: Full Code    Activity level - Baseline/Home:  standby.  Activity Level - Current:   standby.   Lift room needed: No.   Bariatric: No   Needed: No   Isolation: Yes.   Infection: COVID r/o and special precautions.     Respiratory status: Room air    Vital Signs (within 30 minutes):   Vitals:    07/31/24 2144 07/31/24 2153 07/31/24 2208 07/31/24 2223   BP:       Pulse:       Resp:       Temp:       TempSrc:       SpO2: 95% 95% 97% 95%   Weight:       Height:           Cardiac Rhythm:  ,      Pain level:    Patient confused: No.   Patient Falls Risk: arm band in place.   Elimination Status: Has voided     Patient Report - Initial Complaint: pt. C/o confusion, jaundice, back and neck pain, cough. .   Focused Assessment: tp a/ox 4 continent, able to go to bathroom with standby assist. Has patent IV. No cough recently. Fever which was treated by tylenol.      Abnormal Results:   Labs Ordered and Resulted from Time of ED Arrival to Time of ED Departure   COMPREHENSIVE METABOLIC PANEL - Abnormal       Result Value    Sodium 138      Potassium 3.6      Carbon Dioxide (CO2) 14 (*)     Anion Gap 18 (*)     Urea Nitrogen 5.6 (*)     Creatinine 0.86      GFR Estimate 78      Calcium 7.5 (*)     Chloride 106      Glucose 61 (*)     Alkaline Phosphatase 120      AST 43      ALT 29      Protein Total 5.6 (*)     Albumin 2.3 (*)     Bilirubin Total 4.5 (*)    INFLUENZA A/B, RSV, & SARS-COV2 PCR - Abnormal    Influenza A PCR Negative      Influenza B PCR Negative      RSV PCR  Negative      SARS CoV2 PCR Positive (*)    CBC WITH PLATELETS AND DIFFERENTIAL - Abnormal    WBC Count 2.2 (*)     RBC Count 2.76 (*)     Hemoglobin 7.9 (*)     Hematocrit 26.6 (*)     MCV 96      MCH 28.6      MCHC 29.7 (*)     RDW 18.5 (*)     Platelet Count 84 (*)     % Neutrophils 89      % Lymphocytes 9      % Monocytes 1      % Eosinophils 1      % Basophils 1      % Immature Granulocytes 1      NRBCs per 100 WBC 0      Absolute Neutrophils 2.0      Absolute Lymphocytes 0.2 (*)     Absolute Monocytes 0.0      Absolute Eosinophils 0.0      Absolute Basophils 0.0      Absolute Immature Granulocytes 0.0      Absolute NRBCs 0.0     MAGNESIUM - Abnormal    Magnesium 1.3 (*)    ROUTINE UA WITH MICROSCOPIC REFLEX TO CULTURE - Abnormal    Color Urine Yellow      Appearance Urine Slightly Cloudy (*)     Glucose Urine Negative      Bilirubin Urine Negative      Ketones Urine Negative      Specific Gravity Urine 1.017      Blood Urine Moderate (*)     pH Urine 5.5      Protein Albumin Urine 50 (*)     Urobilinogen Urine Normal      Nitrite Urine Negative      Leukocyte Esterase Urine Moderate (*)     Bacteria Urine Few (*)     Mucus Urine Present (*)     Calcium Oxalate Crystals Urine Few (*)     RBC Urine 28 (*)     WBC Urine 11 (*)     Squamous Epithelials Urine 5 (*)     Hyaline Casts Urine 78 (*)     Granular Casts Urine 6 (*)    RBC AND PLATELET MORPHOLOGY - Abnormal    RBC Morphology Confirmed RBC Indices      Platelet Assessment        Value: Automated Count Confirmed. Platelet morphology is normal.    Acanthocytes Slight (*)     Bite Cells Slight (*)     Katie Cells Slight (*)     Polychromasia Slight (*)     RBC Fragments Slight (*)    ISTAT GASES LACTATE VENOUS POCT - Abnormal    Lactic Acid POCT 5.7 (*)     Bicarbonate Venous POCT 16 (*)     O2 Sat, Venous POCT 46 (*)     pCO2 Venous POCT 24 (*)     pH Venous POCT 7.44 (*)     pO2 Venous POCT 24 (*)    IONIZED CALCIUM - Abnormal    Calcium Ionized Whole Blood  4.3 (*)    GLUCOSE BY METER - Abnormal    GLUCOSE BY METER POCT 121 (*)    LACTIC ACID WHOLE BLOOD - Abnormal    Lactic Acid 6.6 (*)    LACTIC ACID WHOLE BLOOD - Abnormal    Lactic Acid 6.5 (*)    PROCALCITONIN - Abnormal    Procalcitonin 4.09 (*)    GLUCOSE MONITOR NURSING POCT   COVID-19 VIRUS (CORONAVIRUS) BY PCR   ROUTINE UA WITH MICROSCOPIC   TYPE AND SCREEN, ADULT    SPECIMEN EXPIRATION DATE [id number]     BLOOD CULTURE   URINE CULTURE   ABO/RH TYPE AND SCREEN        Chest XR,  PA & LAT   Final Result   IMPRESSION: Heart size and pulmonary vascularity normal. The lungs are clear. Surgical clips right upper quadrant.          Treatments provided: tylenol for fever, rocephin for urine, IV fluids for incerased lactic acid of 6.5.   Family Comments: family at home   OBS brochure/video discussed/provided to patient:  No  ED Medications:   Medications   sodium chloride (PF) 0.9% PF flush 3 mL (has no administration in time range)   sodium chloride (PF) 0.9% PF flush 3 mL (3 mLs Intracatheter Not Given 7/31/24 1946)   cefTRIAXone (ROCEPHIN) 2 g vial to attach to  ml bag for ADULTS or NS 50 ml bag for PEDS (has no administration in time range)   sodium chloride 0.9% BOLUS 500 mL (has no administration in time range)   acetaminophen (TYLENOL) tablet 650 mg (650 mg Oral $Given 7/31/24 1845)   sodium chloride 0.9% BOLUS 500 mL (0 mLs Intravenous Stopped 7/31/24 2055)   lactulose (CHRONULAC) solution 30 g (30 g Oral $Given 7/31/24 1957)   dextrose 50 % injection 25 mL (25 mLs Intravenous $Given 7/31/24 2000)   magnesium sulfate 2 g in 50 mL sterile water intermittent infusion (0 g Intravenous Stopped 7/31/24 2054)   diphenhydrAMINE (BENADRYL) injection 25 mg (25 mg Intravenous $Given 7/31/24 2051)   sodium chloride 0.9% BOLUS 1,000 mL (1,000 mLs Intravenous $New Bag 7/31/24 2145)       Drips infusing:  No  For the majority of the shift this patient was Green.   Interventions performed were as above  .    Sepsis  treatment initiated no      Per the ED Provider, Time Zero for severe sepsis or septic shock is:  no sepsis called    3 Hour Severe Sepsis Bundle Completion:  1. Initial Lactic Acid Result:   Recent Labs   Lab Test 07/31/24 2133 07/31/24  2107 07/31/24  1900   LACT 6.5* 6.6* 5.7*     2. Blood Cultures before Antibiotics: No  3. Broad Spectrum Antibiotics Administered:     Anti-infectives (From now, onward)      Start     Dose/Rate Route Frequency Ordered Stop    07/31/24 2235  cefTRIAXone (ROCEPHIN) 2 g vial to attach to  ml bag for ADULTS or NS 50 ml bag for PEDS         2 g  over 30 Minutes Intravenous ONCE 07/31/24 2232            4. 2000 ml of IV fluids have been given so far      6 Hour Severe Sepsis Bundle Completion:    1. Repeat Lactic Acid Level: Last result   Lab Results   Component Value Date    LACT 6.5 (HH) 07/31/2024     2. Patient currently on Vasopressors =  No    Cares/treatment/interventions/medications to be completed following ED care: as above    ED Nurse Name: Cecilia Euceda RN  11:08 PM     RECEIVING UNIT ED HANDOFF REVIEW    Above ED Nurse Handoff Report was reviewed: Yes  Reviewed by: Tri Garcia RN on July 31, 2024 at 11:18 PM   MARCELINO Love called the ED to inform them the note was read: Yes

## 2024-08-02 VITALS
HEART RATE: 81 BPM | SYSTOLIC BLOOD PRESSURE: 117 MMHG | HEIGHT: 62 IN | OXYGEN SATURATION: 96 % | WEIGHT: 146.8 LBS | TEMPERATURE: 98.2 F | DIASTOLIC BLOOD PRESSURE: 57 MMHG | BODY MASS INDEX: 27.02 KG/M2 | RESPIRATION RATE: 18 BRPM

## 2024-08-02 LAB
ALBUMIN SERPL BCG-MCNC: 2.3 G/DL (ref 3.5–5.2)
ALP SERPL-CCNC: 93 U/L (ref 40–150)
ALT SERPL W P-5'-P-CCNC: 54 U/L (ref 0–50)
ANION GAP SERPL CALCULATED.3IONS-SCNC: 10 MMOL/L (ref 7–15)
AST SERPL W P-5'-P-CCNC: 134 U/L (ref 0–45)
BILIRUB SERPL-MCNC: 4.1 MG/DL
BUN SERPL-MCNC: 9.2 MG/DL (ref 6–20)
CALCIUM SERPL-MCNC: 7.6 MG/DL (ref 8.8–10.4)
CHLORIDE SERPL-SCNC: 108 MMOL/L (ref 98–107)
CREAT SERPL-MCNC: 0.73 MG/DL (ref 0.51–0.95)
EGFRCR SERPLBLD CKD-EPI 2021: >90 ML/MIN/1.73M2
ERYTHROCYTE [DISTWIDTH] IN BLOOD BY AUTOMATED COUNT: 19.3 % (ref 10–15)
GLUCOSE SERPL-MCNC: 70 MG/DL (ref 70–99)
HCO3 SERPL-SCNC: 18 MMOL/L (ref 22–29)
HCT VFR BLD AUTO: 25.4 % (ref 35–47)
HGB BLD-MCNC: 7.6 G/DL (ref 11.7–15.7)
MCH RBC QN AUTO: 29.2 PG (ref 26.5–33)
MCHC RBC AUTO-ENTMCNC: 29.9 G/DL (ref 31.5–36.5)
MCV RBC AUTO: 98 FL (ref 78–100)
PLATELET # BLD AUTO: 73 10E3/UL (ref 150–450)
POTASSIUM SERPL-SCNC: 3.6 MMOL/L (ref 3.4–5.3)
PROT SERPL-MCNC: 5.9 G/DL (ref 6.4–8.3)
RBC # BLD AUTO: 2.6 10E6/UL (ref 3.8–5.2)
SODIUM SERPL-SCNC: 136 MMOL/L (ref 135–145)
WBC # BLD AUTO: 15.4 10E3/UL (ref 4–11)

## 2024-08-02 PROCEDURE — 85027 COMPLETE CBC AUTOMATED: CPT | Performed by: INTERNAL MEDICINE

## 2024-08-02 PROCEDURE — 36415 COLL VENOUS BLD VENIPUNCTURE: CPT | Performed by: INTERNAL MEDICINE

## 2024-08-02 PROCEDURE — 250N000013 HC RX MED GY IP 250 OP 250 PS 637: Performed by: STUDENT IN AN ORGANIZED HEALTH CARE EDUCATION/TRAINING PROGRAM

## 2024-08-02 PROCEDURE — G0378 HOSPITAL OBSERVATION PER HR: HCPCS

## 2024-08-02 PROCEDURE — 99239 HOSP IP/OBS DSCHRG MGMT >30: CPT | Performed by: INTERNAL MEDICINE

## 2024-08-02 PROCEDURE — 80053 COMPREHEN METABOLIC PANEL: CPT | Performed by: INTERNAL MEDICINE

## 2024-08-02 RX ADMIN — PANTOPRAZOLE SODIUM 40 MG: 40 TABLET, DELAYED RELEASE ORAL at 09:03

## 2024-08-02 RX ADMIN — RIFAXIMIN 550 MG: 550 TABLET ORAL at 09:03

## 2024-08-02 RX ADMIN — LACTULOSE 30 G: 20 SOLUTION ORAL at 09:03

## 2024-08-02 ASSESSMENT — ACTIVITIES OF DAILY LIVING (ADL)
ADLS_ACUITY_SCORE: 23

## 2024-08-02 NOTE — PLAN OF CARE
"RA , Independent .  Denied SOB or chest pain or burning sensation while passing urine . Special isolation precaution maintained .         Goal Outcome Evaluation:      Plan of Care Reviewed With: patient    Overall Patient Progress: improving    Outcome Evaluation:RA , Soft BP , Afebrile , watery stool x 3 . Denies abdominal cramp , nausia , vomiting , bleeding per rectum or tenesmus. Infrequent dry non productive cough . No dysuria .     Problem: Adult Inpatient Plan of Care  Goal: Plan of Care Review  Description: The Plan of Care Review/Shift note should be completed every shift.  The Outcome Evaluation is a brief statement about your assessment that the patient is improving, declining, or no change.  This information will be displayed automatically on your shift  note.  Outcome: Progressing  Flowsheets (Taken 8/2/2024 4749)  Outcome Evaluation: LR stared at 125/hr, Watery stool x , denies abdominal cramp , nausia , afgeril , independnt ,  Plan of Care Reviewed With: patient  Overall Patient Progress: improving  Goal: Patient-Specific Goal (Individualized)  Description: You can add care plan individualizations to a care plan. Examples of Individualization might be:  \"Parent requests to be called daily at 9am for status\", \"I have a hard time hearing out of my right ear\", or \"Do not touch me to wake me up as it startles  me\".  Outcome: Progressing  Goal: Optimal Comfort and Wellbeing  Outcome: Progressing  Goal: Readiness for Transition of Care  Outcome: Progressing     Problem: Fatigue  Goal: Improved Activity Tolerance  Outcome: Progressing     Problem: Infection  Goal: Absence of Infection Signs and Symptoms  Outcome: Progressing  Intervention: Prevent or Manage Infection  Recent Flowsheet Documentation  Taken 8/1/2024 2123 by Imer Mir RN  Isolation Precautions: special precautions initiated       "

## 2024-08-02 NOTE — DISCHARGE SUMMARY
"[facility]  Discharge Summary    Admit date:  7/31/2024    Discharge date and time: 8/2/2024    Discharge Physician: Angus Abad MD    Primary care provider: Chevy Fonseca    Primary Discharge Diagnosis      Intractable Nausea & Vomiting w/ Hypovolemia & Lactic Acidosis     Secondary Diagnoses     COVID-19 Infection   Acute Cystitis ruled out  PRINGLE Cirrhosis  Pancytopenia  Hypoglycemia  Chronic Crohn's disease    Summary of Hospital Stay     59F with history of Crohn's disease s/p bowel resection and PRINGLE cirrhosis c/b HE and pancytopenia presented with intractable nausea and vomiting w/ lactic acidosis and found to have COVID-19.  Also initial concern for acute cystitis although urine culture negative and antibiotics were discontinued at that time.  Symptomatically improved after IVF and antiemetics.  Declined antiemetics at time of discharge as she is feeling back to her baseline.  Recommend follow-up with PCP and gastroenterology.  Needs repeat CBC and CMP completed 1 week (ordered).    Patient Discharge Condition & Exam     Discharge condition: Improved    /57 (BP Location: Right arm)   Pulse 81   Temp 98.2  F (36.8  C) (Oral)   Resp 18   Ht 1.575 m (5' 2\")   Wt 66.6 kg (146 lb 12.8 oz)   SpO2 96%   BMI 26.85 kg/m       General: In NAD.  Cardiac: RRR.  Lungs: CTAB.  Abd: Non-tender.  Ext: No edema.    Discharge Instructions     Patient/family instructions: Written discharge instruction given to patient/family    Discharge Medications:     Review of your medicines        CONTINUE these medicines which have NOT CHANGED        Dose / Directions   cyanocobalamin 1000 MCG/ML injection  Commonly known as: CYANOCOBALAMIN      Dose: 1 mL  Inject 1 mL as directed every 30 days.  Refills: 0     lactulose 10 GM/15ML solution  Commonly known as: CHRONULAC      Dose: 30 g  Take 30 g by mouth 2 times daily  Refills: 0     MULTIVITAMIN GUMMIES ADULTS PO      Dose: 1 chew tab  Take 1 chew " tab by mouth daily  Refills: 0     omeprazole 20 MG DR capsule  Commonly known as: PriLOSEC      Dose: 20 mg  Take 20 mg by mouth daily  Refills: 0     rifaximin 550 MG Tabs tablet  Commonly known as: XIFAXAN      Dose: 550 mg  Take 550 mg by mouth 2 times daily  Refills: 0     Vitamin D3 25 mcg (1000 units) tablet  Commonly known as: CHOLECALCIFEROL      Dose: 1 tablet  Take 1 tablet by mouth daily  Refills: 0     zinc sulfate 220 (50 Zn) MG capsule  Commonly known as: ZINCATE  Used for: Hepatic encephalopathy (H)      Dose: 220 mg  Take 1 capsule (220 mg) by mouth daily  Quantity: 30 capsule  Refills: 1             Discharge diet: regular diet    Discharge activity: Activity as tolerated    Discharge follow-up:    Follow up with primary care provider within one week or earlier if symptoms return or gets worse.    Follow up with consultant as instructed.    Pending Results     Unresulted Labs Ordered in the Past 30 Days of this Admission       Date and Time Order Name Status Description    7/31/2024  6:46 PM Blood Culture Peripheral Blood Preliminary              Patient Allergies     Allergies   Allergen Reactions    Iodinated Contrast Media Unknown     Sneezed once. This was ten years ago and pt doesn't recall any other symptoms.     Disposition   Disposition: home     I saw and evaluated the patient on day of discharge and  discharge instructions reviewed  and  all the patient's questions and concerns addressed. Over 30 minutes spent on discharge and coordination of discharge process for this patient.

## 2024-08-02 NOTE — PROGRESS NOTES
Discharge instructions reviewed with the patient. The patient zamudio full understanding of instructions. Discharge to home w/spouse via staff w/c assistance.    Discharged 1122

## 2024-08-05 LAB — BACTERIA BLD CULT: NO GROWTH

## 2024-08-26 ENCOUNTER — TRANSFERRED RECORDS (OUTPATIENT)
Dept: HEALTH INFORMATION MANAGEMENT | Facility: CLINIC | Age: 59
End: 2024-08-26
Payer: COMMERCIAL

## 2024-08-26 LAB
CREATININE (EXTERNAL): 0.63 MG/DL (ref 0.57–1)
GFR ESTIMATED (EXTERNAL): 103 ML/MIN/1.73
GLUCOSE (EXTERNAL): 72 MG/DL (ref 70–99)
POTASSIUM (EXTERNAL): 3.8 MMOL/L (ref 3.5–5.2)

## 2024-12-22 ENCOUNTER — HEALTH MAINTENANCE LETTER (OUTPATIENT)
Age: 59
End: 2024-12-22

## 2024-12-31 ENCOUNTER — HOSPITAL ENCOUNTER (OUTPATIENT)
Dept: ULTRASOUND IMAGING | Facility: CLINIC | Age: 59
Discharge: HOME | End: 2024-12-31
Attending: INTERNAL MEDICINE
Payer: COMMERCIAL

## 2024-12-31 DIAGNOSIS — K74.60 UNSPECIFIED CIRRHOSIS OF LIVER (H): ICD-10-CM

## 2024-12-31 PROCEDURE — 76705 ECHO EXAM OF ABDOMEN: CPT

## (undated) RX ORDER — LIDOCAINE HYDROCHLORIDE 20 MG/ML
INJECTION, SOLUTION EPIDURAL; INFILTRATION; INTRACAUDAL; PERINEURAL
Status: DISPENSED
Start: 2018-01-18

## (undated) RX ORDER — PROPOFOL 10 MG/ML
INJECTION, EMULSION INTRAVENOUS
Status: DISPENSED
Start: 2018-01-18

## (undated) RX ORDER — FENTANYL CITRATE 50 UG/ML
INJECTION, SOLUTION INTRAMUSCULAR; INTRAVENOUS
Status: DISPENSED
Start: 2022-01-12

## (undated) RX ORDER — FENTANYL CITRATE 50 UG/ML
INJECTION, SOLUTION INTRAMUSCULAR; INTRAVENOUS
Status: DISPENSED
Start: 2018-01-18

## (undated) RX ORDER — DEXAMETHASONE SODIUM PHOSPHATE 4 MG/ML
INJECTION, SOLUTION INTRA-ARTICULAR; INTRALESIONAL; INTRAMUSCULAR; INTRAVENOUS; SOFT TISSUE
Status: DISPENSED
Start: 2018-01-18

## (undated) RX ORDER — ONDANSETRON 2 MG/ML
INJECTION INTRAMUSCULAR; INTRAVENOUS
Status: DISPENSED
Start: 2018-01-18